# Patient Record
Sex: FEMALE | Race: BLACK OR AFRICAN AMERICAN | NOT HISPANIC OR LATINO | Employment: FULL TIME | URBAN - METROPOLITAN AREA
[De-identification: names, ages, dates, MRNs, and addresses within clinical notes are randomized per-mention and may not be internally consistent; named-entity substitution may affect disease eponyms.]

---

## 2017-12-27 ENCOUNTER — APPOINTMENT (OUTPATIENT)
Dept: WOMENS IMAGING | Facility: HOSPITAL | Age: 47
End: 2017-12-27

## 2017-12-27 PROCEDURE — 77063 BREAST TOMOSYNTHESIS BI: CPT | Performed by: RADIOLOGY

## 2017-12-27 PROCEDURE — 77067 SCR MAMMO BI INCL CAD: CPT | Performed by: RADIOLOGY

## 2018-01-10 ENCOUNTER — APPOINTMENT (OUTPATIENT)
Dept: PREADMISSION TESTING | Facility: HOSPITAL | Age: 48
End: 2018-01-10

## 2018-01-10 VITALS
DIASTOLIC BLOOD PRESSURE: 89 MMHG | BODY MASS INDEX: 37.28 KG/M2 | HEART RATE: 66 BPM | SYSTOLIC BLOOD PRESSURE: 127 MMHG | OXYGEN SATURATION: 100 % | HEIGHT: 68 IN | RESPIRATION RATE: 16 BRPM | WEIGHT: 246 LBS | TEMPERATURE: 98 F

## 2018-01-10 LAB
ANION GAP SERPL CALCULATED.3IONS-SCNC: 11.8 MMOL/L
BUN BLD-MCNC: 9 MG/DL (ref 6–20)
BUN/CREAT SERPL: 10.8 (ref 7–25)
CALCIUM SPEC-SCNC: 9.2 MG/DL (ref 8.6–10.5)
CHLORIDE SERPL-SCNC: 102 MMOL/L (ref 98–107)
CO2 SERPL-SCNC: 27.2 MMOL/L (ref 22–29)
CREAT BLD-MCNC: 0.83 MG/DL (ref 0.57–1)
DEPRECATED RDW RBC AUTO: 49.1 FL (ref 37–54)
ERYTHROCYTE [DISTWIDTH] IN BLOOD BY AUTOMATED COUNT: 16.1 % (ref 11.7–13)
GFR SERPL CREATININE-BSD FRML MDRD: 90 ML/MIN/1.73
GLUCOSE BLD-MCNC: 99 MG/DL (ref 65–99)
HCG SERPL QL: NEGATIVE
HCT VFR BLD AUTO: 31.8 % (ref 35.6–45.5)
HGB BLD-MCNC: 9.6 G/DL (ref 11.9–15.5)
MCH RBC QN AUTO: 25.5 PG (ref 26.9–32)
MCHC RBC AUTO-ENTMCNC: 30.2 G/DL (ref 32.4–36.3)
MCV RBC AUTO: 84.6 FL (ref 80.5–98.2)
PLATELET # BLD AUTO: 201 10*3/MM3 (ref 140–500)
PMV BLD AUTO: 12.4 FL (ref 6–12)
POTASSIUM BLD-SCNC: 3.7 MMOL/L (ref 3.5–5.2)
RBC # BLD AUTO: 3.76 10*6/MM3 (ref 3.9–5.2)
SODIUM BLD-SCNC: 141 MMOL/L (ref 136–145)
WBC NRBC COR # BLD: 4.22 10*3/MM3 (ref 4.5–10.7)

## 2018-01-10 PROCEDURE — 93010 ELECTROCARDIOGRAM REPORT: CPT | Performed by: INTERNAL MEDICINE

## 2018-01-10 PROCEDURE — 36415 COLL VENOUS BLD VENIPUNCTURE: CPT

## 2018-01-10 PROCEDURE — 80048 BASIC METABOLIC PNL TOTAL CA: CPT | Performed by: OBSTETRICS & GYNECOLOGY

## 2018-01-10 PROCEDURE — 84703 CHORIONIC GONADOTROPIN ASSAY: CPT | Performed by: OBSTETRICS & GYNECOLOGY

## 2018-01-10 PROCEDURE — 93005 ELECTROCARDIOGRAM TRACING: CPT

## 2018-01-10 PROCEDURE — 85027 COMPLETE CBC AUTOMATED: CPT | Performed by: OBSTETRICS & GYNECOLOGY

## 2018-01-10 RX ORDER — AZELASTINE 1 MG/ML
2 SPRAY, METERED NASAL DAILY PRN
COMMUNITY
Start: 2017-07-10

## 2018-01-10 RX ORDER — UBIDECARENONE 75 MG
100 CAPSULE ORAL DAILY
COMMUNITY

## 2018-01-10 RX ORDER — LISINOPRIL 5 MG/1
5 TABLET ORAL EVERY MORNING
COMMUNITY
End: 2022-03-01 | Stop reason: SDUPTHER

## 2018-01-10 RX ORDER — FERROUS SULFATE 325(65) MG
325 TABLET ORAL
COMMUNITY
End: 2022-05-03 | Stop reason: SDUPTHER

## 2018-01-10 RX ORDER — MULTIVIT WITH MINERALS/LUTEIN
250 TABLET ORAL 2 TIMES WEEKLY
COMMUNITY

## 2018-01-10 RX ORDER — ALBUTEROL SULFATE 90 UG/1
2 AEROSOL, METERED RESPIRATORY (INHALATION) AS NEEDED
COMMUNITY
Start: 2014-08-05 | End: 2018-04-26

## 2018-01-10 RX ORDER — LORATADINE 10 MG/1
10 TABLET ORAL DAILY PRN
COMMUNITY

## 2018-01-10 RX ORDER — FLUTICASONE PROPIONATE 50 MCG
1 SPRAY, SUSPENSION (ML) NASAL AS NEEDED
COMMUNITY
Start: 2017-04-26

## 2018-01-10 NOTE — DISCHARGE INSTRUCTIONS
Take the following medications the morning of surgery with a small sip of water:    ALBUTEROL INHALER    General Instructions:  • Do not eat solid food after midnight the night before surgery.  • You may drink clear liquids day of surgery but must stop at least one hour before your hospital arrival time @ 0600 AM STOP DRINKING!!!  • It is beneficial for you to have a clear drink that contains carbohydrates the day of surgery.  We suggest a 12 to 20 ounce bottle of Gatorade or Powerade for non-diabetic patients or a 12 to 20 ounce bottle of G2 or Powerade Zero for diabetic patients. (Pediatric patients, are not advised to drink a 12 to 20 ounce carbohydrate drink)    Clear liquids are liquids you can see through.  Nothing red in color.     Plain water                               Sports drinks  Sodas                                   Gelatin (Jell-O)  Fruit juices without pulp such as white grape juice and apple juice  Popsicles that contain no fruit or yogurt  Tea or coffee (no cream or milk added)  Gatorade / Powerade  G2 / Powerade Zero    • Patients who avoid smoking, chewing tobacco and alcohol for 4 weeks prior to surgery have a reduced risk of post-operative complications.  Quit smoking as many days before surgery as you can.  • Do not smoke, use chewing tobacco or drink alcohol the day of surgery.   • Bring any papers given to you in the doctor’s office.  • Wear clean comfortable clothes and socks.  • Do not wear contact lenses or make-up.  Bring a case for your glasses.   • Bring crutches or walker if applicable.  • Remove all piercings.  Leave jewelry and any other valuables at home.  • Hair extensions with metal clips must be removed prior to surgery.  • The Pre-Admission Testing nurse will instruct you to bring medications if unable to obtain an accurate list in Pre-Admission Testing.      Preventing a Surgical Site Infection:  • For 2 to 3 days before surgery, avoid shaving with a razor because the  razor can irritate skin and make it easier to develop an infection.  • The night prior to surgery sleep in a clean bed with clean clothing.  Do not allow pets to sleep with you.  • Shower on the morning of surgery using a fresh bar of anti-bacterial soap (such as Dial) and clean washcloth.  Dry with a clean towel and dress in clean clothing.  • Ask your surgeon if you will be receiving antibiotics prior to surgery.  • Make sure you, your family, and all healthcare providers clean their hands with soap and water or an alcohol based hand  before caring for you or your wound.    Day of surgery: 01- ARRIVE @ 0700 AM REPORT TO THE MAIN OR   Upon arrival, a Pre-op nurse and Anesthesiologist will review your health history, obtain vital signs, and answer questions you may have.  The only belongings needed at this time will be your home medications.  If you are staying overnight your family can leave the rest of your belongings in the car and bring them to your room later.  A Pre-op nurse will start an IV and you may receive medication in preparation for surgery, including something to help you relax.  Your family will be able to see you in the Pre-op area.  While you are in surgery your family should notify the waiting room  if they leave the waiting room area and provide a contact phone number.    Please be aware that surgery does come with discomfort.  We want to make every effort to control your discomfort so please discuss any uncontrolled symptoms with your nurse.   Your doctor will most likely have prescribed pain medications.      If you are going home after surgery you will receive individualized written care instructions before being discharged.  A responsible adult must drive you to and from the hospital on the day of your surgery and stay with you for 24 hours.    If you are staying overnight following surgery, you will be transported to your hospital room following the recovery  period.  [facility] has all private rooms.    If you have any questions please call Pre-Admission Testing at [phone].  Deductibles and co-payments are collected on the day of service. Please be prepared to pay the required co-pay, deductible or deposit on the day of service as defined by your plan.

## 2018-01-15 ENCOUNTER — ANESTHESIA EVENT (OUTPATIENT)
Dept: PERIOP | Facility: HOSPITAL | Age: 48
End: 2018-01-15

## 2018-01-15 ENCOUNTER — HOSPITAL ENCOUNTER (OUTPATIENT)
Facility: HOSPITAL | Age: 48
Setting detail: OBSERVATION
Discharge: HOME | End: 2018-01-17
Attending: OBSTETRICS & GYNECOLOGY | Admitting: OBSTETRICS & GYNECOLOGY

## 2018-01-15 ENCOUNTER — ANESTHESIA (OUTPATIENT)
Dept: PERIOP | Facility: HOSPITAL | Age: 48
End: 2018-01-15

## 2018-01-15 DIAGNOSIS — D25.9 UTERINE FIBROID: ICD-10-CM

## 2018-01-15 DIAGNOSIS — N93.9 ABNORMAL UTERINE BLEEDING: ICD-10-CM

## 2018-01-15 PROCEDURE — 88307 TISSUE EXAM BY PATHOLOGIST: CPT | Performed by: OBSTETRICS & GYNECOLOGY

## 2018-01-15 PROCEDURE — 25010000002 PROPOFOL 10 MG/ML EMULSION: Performed by: NURSE ANESTHETIST, CERTIFIED REGISTERED

## 2018-01-15 PROCEDURE — 25010000002 HYDROMORPHONE PER 4 MG: Performed by: NURSE ANESTHETIST, CERTIFIED REGISTERED

## 2018-01-15 PROCEDURE — 25010000002 GENTAMICIN PER 80 MG: Performed by: OBSTETRICS & GYNECOLOGY

## 2018-01-15 PROCEDURE — 25010000002 ONDANSETRON PER 1 MG: Performed by: OBSTETRICS & GYNECOLOGY

## 2018-01-15 PROCEDURE — 94799 UNLISTED PULMONARY SVC/PX: CPT

## 2018-01-15 PROCEDURE — G0378 HOSPITAL OBSERVATION PER HR: HCPCS

## 2018-01-15 PROCEDURE — 25010000002 FENTANYL CITRATE (PF) 100 MCG/2ML SOLUTION: Performed by: NURSE ANESTHETIST, CERTIFIED REGISTERED

## 2018-01-15 PROCEDURE — 25010000002 DEXAMETHASONE PER 1 MG: Performed by: NURSE ANESTHETIST, CERTIFIED REGISTERED

## 2018-01-15 PROCEDURE — 25010000002 PROMETHAZINE PER 50 MG: Performed by: OBSTETRICS & GYNECOLOGY

## 2018-01-15 PROCEDURE — 25010000002 ROPIVACAINE PER 1 MG: Performed by: OBSTETRICS & GYNECOLOGY

## 2018-01-15 PROCEDURE — 25010000002 ONDANSETRON PER 1 MG: Performed by: ANESTHESIOLOGY

## 2018-01-15 PROCEDURE — 25010000002 MIDAZOLAM PER 1 MG: Performed by: ANESTHESIOLOGY

## 2018-01-15 PROCEDURE — 25010000002 NEOSTIGMINE PER 0.5 MG: Performed by: NURSE ANESTHETIST, CERTIFIED REGISTERED

## 2018-01-15 RX ORDER — DIPHENHYDRAMINE HYDROCHLORIDE 50 MG/ML
12.5 INJECTION INTRAMUSCULAR; INTRAVENOUS
Status: DISCONTINUED | OUTPATIENT
Start: 2018-01-15 | End: 2018-01-15 | Stop reason: HOSPADM

## 2018-01-15 RX ORDER — MIDAZOLAM HYDROCHLORIDE 1 MG/ML
2 INJECTION INTRAMUSCULAR; INTRAVENOUS
Status: DISCONTINUED | OUTPATIENT
Start: 2018-01-15 | End: 2018-01-15 | Stop reason: HOSPADM

## 2018-01-15 RX ORDER — HYDROMORPHONE HCL 110MG/55ML
0.5 PATIENT CONTROLLED ANALGESIA SYRINGE INTRAVENOUS
Status: DISCONTINUED | OUTPATIENT
Start: 2018-01-15 | End: 2018-01-15 | Stop reason: HOSPADM

## 2018-01-15 RX ORDER — DEXAMETHASONE SODIUM PHOSPHATE 10 MG/ML
INJECTION INTRAMUSCULAR; INTRAVENOUS AS NEEDED
Status: DISCONTINUED | OUTPATIENT
Start: 2018-01-15 | End: 2018-01-15 | Stop reason: SURG

## 2018-01-15 RX ORDER — NAPROXEN 500 MG/1
500 TABLET ORAL
Status: COMPLETED | OUTPATIENT
Start: 2018-01-15 | End: 2018-01-17

## 2018-01-15 RX ORDER — MAGNESIUM HYDROXIDE 1200 MG/15ML
LIQUID ORAL AS NEEDED
Status: DISCONTINUED | OUTPATIENT
Start: 2018-01-15 | End: 2018-01-15 | Stop reason: HOSPADM

## 2018-01-15 RX ORDER — ACETAMINOPHEN 160 MG/5ML
650 SOLUTION ORAL EVERY 4 HOURS PRN
Status: DISCONTINUED | OUTPATIENT
Start: 2018-01-15 | End: 2018-01-17 | Stop reason: HOSPADM

## 2018-01-15 RX ORDER — ACETAMINOPHEN 650 MG/1
650 SUPPOSITORY RECTAL EVERY 4 HOURS PRN
Status: DISCONTINUED | OUTPATIENT
Start: 2018-01-15 | End: 2018-01-17 | Stop reason: HOSPADM

## 2018-01-15 RX ORDER — PROMETHAZINE HYDROCHLORIDE 25 MG/1
25 SUPPOSITORY RECTAL ONCE AS NEEDED
Status: DISCONTINUED | OUTPATIENT
Start: 2018-01-15 | End: 2018-01-15 | Stop reason: HOSPADM

## 2018-01-15 RX ORDER — ZOLPIDEM TARTRATE 5 MG/1
5 TABLET ORAL NIGHTLY PRN
Status: DISCONTINUED | OUTPATIENT
Start: 2018-01-15 | End: 2018-01-17 | Stop reason: HOSPADM

## 2018-01-15 RX ORDER — EPHEDRINE SULFATE 50 MG/ML
5 INJECTION, SOLUTION INTRAVENOUS ONCE AS NEEDED
Status: DISCONTINUED | OUTPATIENT
Start: 2018-01-15 | End: 2018-01-15 | Stop reason: HOSPADM

## 2018-01-15 RX ORDER — HYDRALAZINE HYDROCHLORIDE 20 MG/ML
5 INJECTION INTRAMUSCULAR; INTRAVENOUS
Status: DISCONTINUED | OUTPATIENT
Start: 2018-01-15 | End: 2018-01-15 | Stop reason: HOSPADM

## 2018-01-15 RX ORDER — CLINDAMYCIN PHOSPHATE 900 MG/50ML
900 INJECTION INTRAVENOUS
Status: COMPLETED | OUTPATIENT
Start: 2018-01-15 | End: 2018-01-15

## 2018-01-15 RX ORDER — SODIUM CHLORIDE, SODIUM LACTATE, POTASSIUM CHLORIDE, CALCIUM CHLORIDE 600; 310; 30; 20 MG/100ML; MG/100ML; MG/100ML; MG/100ML
9 INJECTION, SOLUTION INTRAVENOUS CONTINUOUS
Status: DISCONTINUED | OUTPATIENT
Start: 2018-01-15 | End: 2018-01-17 | Stop reason: HOSPADM

## 2018-01-15 RX ORDER — ONDANSETRON 2 MG/ML
4 INJECTION INTRAMUSCULAR; INTRAVENOUS ONCE AS NEEDED
Status: DISCONTINUED | OUTPATIENT
Start: 2018-01-15 | End: 2018-01-15 | Stop reason: HOSPADM

## 2018-01-15 RX ORDER — MIDAZOLAM HYDROCHLORIDE 1 MG/ML
1 INJECTION INTRAMUSCULAR; INTRAVENOUS
Status: DISCONTINUED | OUTPATIENT
Start: 2018-01-15 | End: 2018-01-15 | Stop reason: HOSPADM

## 2018-01-15 RX ORDER — LIDOCAINE HYDROCHLORIDE 20 MG/ML
INJECTION, SOLUTION INFILTRATION; PERINEURAL AS NEEDED
Status: DISCONTINUED | OUTPATIENT
Start: 2018-01-15 | End: 2018-01-15 | Stop reason: SURG

## 2018-01-15 RX ORDER — FAMOTIDINE 20 MG/1
20 TABLET, FILM COATED ORAL 2 TIMES DAILY
Status: DISCONTINUED | OUTPATIENT
Start: 2018-01-15 | End: 2018-01-17 | Stop reason: HOSPADM

## 2018-01-15 RX ORDER — ONDANSETRON 2 MG/ML
4 INJECTION INTRAMUSCULAR; INTRAVENOUS EVERY 6 HOURS PRN
Status: DISCONTINUED | OUTPATIENT
Start: 2018-01-15 | End: 2018-01-17 | Stop reason: HOSPADM

## 2018-01-15 RX ORDER — PROPOFOL 10 MG/ML
VIAL (ML) INTRAVENOUS AS NEEDED
Status: DISCONTINUED | OUTPATIENT
Start: 2018-01-15 | End: 2018-01-15 | Stop reason: SURG

## 2018-01-15 RX ORDER — ONDANSETRON 2 MG/ML
4 INJECTION INTRAMUSCULAR; INTRAVENOUS ONCE AS NEEDED
Status: COMPLETED | OUTPATIENT
Start: 2018-01-15 | End: 2018-01-15

## 2018-01-15 RX ORDER — LIDOCAINE HYDROCHLORIDE 10 MG/ML
0.5 INJECTION, SOLUTION EPIDURAL; INFILTRATION; INTRACAUDAL; PERINEURAL ONCE AS NEEDED
Status: DISCONTINUED | OUTPATIENT
Start: 2018-01-15 | End: 2018-01-15 | Stop reason: HOSPADM

## 2018-01-15 RX ORDER — OXYCODONE HYDROCHLORIDE AND ACETAMINOPHEN 5; 325 MG/1; MG/1
1 TABLET ORAL EVERY 4 HOURS PRN
Status: DISCONTINUED | OUTPATIENT
Start: 2018-01-15 | End: 2018-01-17 | Stop reason: HOSPADM

## 2018-01-15 RX ORDER — SCOLOPAMINE TRANSDERMAL SYSTEM 1 MG/1
1 PATCH, EXTENDED RELEASE TRANSDERMAL ONCE
Status: COMPLETED | OUTPATIENT
Start: 2018-01-15 | End: 2018-01-16

## 2018-01-15 RX ORDER — SODIUM CHLORIDE 0.9 % (FLUSH) 0.9 %
1-10 SYRINGE (ML) INJECTION AS NEEDED
Status: DISCONTINUED | OUTPATIENT
Start: 2018-01-15 | End: 2018-01-15 | Stop reason: HOSPADM

## 2018-01-15 RX ORDER — FENTANYL CITRATE 50 UG/ML
50 INJECTION, SOLUTION INTRAMUSCULAR; INTRAVENOUS
Status: DISCONTINUED | OUTPATIENT
Start: 2018-01-15 | End: 2018-01-15 | Stop reason: HOSPADM

## 2018-01-15 RX ORDER — SIMETHICONE 80 MG
80 TABLET,CHEWABLE ORAL 4 TIMES DAILY PRN
Status: DISCONTINUED | OUTPATIENT
Start: 2018-01-15 | End: 2018-01-17 | Stop reason: HOSPADM

## 2018-01-15 RX ORDER — CALCIUM CARBONATE 200(500)MG
2 TABLET,CHEWABLE ORAL 3 TIMES DAILY PRN
Status: DISCONTINUED | OUTPATIENT
Start: 2018-01-15 | End: 2018-01-17 | Stop reason: HOSPADM

## 2018-01-15 RX ORDER — FENTANYL CITRATE 50 UG/ML
INJECTION, SOLUTION INTRAMUSCULAR; INTRAVENOUS AS NEEDED
Status: DISCONTINUED | OUTPATIENT
Start: 2018-01-15 | End: 2018-01-15 | Stop reason: SURG

## 2018-01-15 RX ORDER — ONDANSETRON 4 MG/1
4 TABLET, ORALLY DISINTEGRATING ORAL EVERY 6 HOURS PRN
Status: DISCONTINUED | OUTPATIENT
Start: 2018-01-15 | End: 2018-01-17 | Stop reason: HOSPADM

## 2018-01-15 RX ORDER — ACETAMINOPHEN 325 MG/1
650 TABLET ORAL EVERY 4 HOURS PRN
Status: DISCONTINUED | OUTPATIENT
Start: 2018-01-15 | End: 2018-01-17 | Stop reason: HOSPADM

## 2018-01-15 RX ORDER — HYDROMORPHONE HCL IN 0.9% NACL 10 MG/50ML
PATIENT CONTROLLED ANALGESIA SYRINGE INTRAVENOUS
Status: COMPLETED
Start: 2018-01-15 | End: 2018-01-15

## 2018-01-15 RX ORDER — PROMETHAZINE HYDROCHLORIDE 25 MG/1
25 TABLET ORAL ONCE AS NEEDED
Status: DISCONTINUED | OUTPATIENT
Start: 2018-01-15 | End: 2018-01-15 | Stop reason: HOSPADM

## 2018-01-15 RX ORDER — PROMETHAZINE HYDROCHLORIDE 25 MG/ML
12.5 INJECTION, SOLUTION INTRAMUSCULAR; INTRAVENOUS ONCE AS NEEDED
Status: DISCONTINUED | OUTPATIENT
Start: 2018-01-15 | End: 2018-01-15 | Stop reason: HOSPADM

## 2018-01-15 RX ORDER — PROMETHAZINE HYDROCHLORIDE 25 MG/1
12.5 SUPPOSITORY RECTAL EVERY 6 HOURS PRN
Status: DISCONTINUED | OUTPATIENT
Start: 2018-01-15 | End: 2018-01-17 | Stop reason: HOSPADM

## 2018-01-15 RX ORDER — SODIUM CHLORIDE 9 MG/ML
INJECTION, SOLUTION INTRAVENOUS AS NEEDED
Status: DISCONTINUED | OUTPATIENT
Start: 2018-01-15 | End: 2018-01-15 | Stop reason: HOSPADM

## 2018-01-15 RX ORDER — ROCURONIUM BROMIDE 10 MG/ML
INJECTION, SOLUTION INTRAVENOUS AS NEEDED
Status: DISCONTINUED | OUTPATIENT
Start: 2018-01-15 | End: 2018-01-15 | Stop reason: SURG

## 2018-01-15 RX ORDER — CELECOXIB 200 MG/1
400 CAPSULE ORAL ONCE
Status: COMPLETED | OUTPATIENT
Start: 2018-01-15 | End: 2018-01-15

## 2018-01-15 RX ORDER — OXYCODONE HCL 10 MG/1
20 TABLET, FILM COATED, EXTENDED RELEASE ORAL ONCE
Status: COMPLETED | OUTPATIENT
Start: 2018-01-15 | End: 2018-01-15

## 2018-01-15 RX ORDER — GLYCOPYRROLATE 0.2 MG/ML
INJECTION INTRAMUSCULAR; INTRAVENOUS AS NEEDED
Status: DISCONTINUED | OUTPATIENT
Start: 2018-01-15 | End: 2018-01-15 | Stop reason: SURG

## 2018-01-15 RX ORDER — SODIUM CHLORIDE, SODIUM LACTATE, POTASSIUM CHLORIDE, CALCIUM CHLORIDE 600; 310; 30; 20 MG/100ML; MG/100ML; MG/100ML; MG/100ML
INJECTION, SOLUTION INTRAVENOUS CONTINUOUS PRN
Status: DISCONTINUED | OUTPATIENT
Start: 2018-01-15 | End: 2018-01-15 | Stop reason: SURG

## 2018-01-15 RX ORDER — FAMOTIDINE 10 MG/ML
20 INJECTION, SOLUTION INTRAVENOUS ONCE
Status: COMPLETED | OUTPATIENT
Start: 2018-01-15 | End: 2018-01-15

## 2018-01-15 RX ORDER — NALOXONE HCL 0.4 MG/ML
0.2 VIAL (ML) INJECTION AS NEEDED
Status: DISCONTINUED | OUTPATIENT
Start: 2018-01-15 | End: 2018-01-15 | Stop reason: HOSPADM

## 2018-01-15 RX ORDER — ONDANSETRON 4 MG/1
4 TABLET, FILM COATED ORAL EVERY 6 HOURS PRN
Status: DISCONTINUED | OUTPATIENT
Start: 2018-01-15 | End: 2018-01-17 | Stop reason: HOSPADM

## 2018-01-15 RX ORDER — PROMETHAZINE HYDROCHLORIDE 25 MG/ML
12.5 INJECTION, SOLUTION INTRAMUSCULAR; INTRAVENOUS EVERY 6 HOURS PRN
Status: DISCONTINUED | OUTPATIENT
Start: 2018-01-15 | End: 2018-01-17 | Stop reason: HOSPADM

## 2018-01-15 RX ORDER — HYDROMORPHONE HCL IN 0.9% NACL 10 MG/50ML
PATIENT CONTROLLED ANALGESIA SYRINGE INTRAVENOUS CONTINUOUS
Status: DISCONTINUED | OUTPATIENT
Start: 2018-01-15 | End: 2018-01-17 | Stop reason: HOSPADM

## 2018-01-15 RX ORDER — DOCUSATE SODIUM 100 MG/1
100 CAPSULE, LIQUID FILLED ORAL 2 TIMES DAILY PRN
Status: DISCONTINUED | OUTPATIENT
Start: 2018-01-15 | End: 2018-01-17 | Stop reason: HOSPADM

## 2018-01-15 RX ORDER — LABETALOL HYDROCHLORIDE 5 MG/ML
5 INJECTION, SOLUTION INTRAVENOUS
Status: DISCONTINUED | OUTPATIENT
Start: 2018-01-15 | End: 2018-01-15 | Stop reason: HOSPADM

## 2018-01-15 RX ORDER — DIPHENHYDRAMINE HYDROCHLORIDE 50 MG/ML
25 INJECTION INTRAMUSCULAR; INTRAVENOUS EVERY 6 HOURS PRN
Status: DISCONTINUED | OUTPATIENT
Start: 2018-01-15 | End: 2018-01-17 | Stop reason: HOSPADM

## 2018-01-15 RX ORDER — OXYCODONE AND ACETAMINOPHEN 10; 325 MG/1; MG/1
1 TABLET ORAL EVERY 4 HOURS PRN
Status: DISCONTINUED | OUTPATIENT
Start: 2018-01-15 | End: 2018-01-17 | Stop reason: HOSPADM

## 2018-01-15 RX ORDER — PROMETHAZINE HYDROCHLORIDE 12.5 MG/1
12.5 TABLET ORAL EVERY 6 HOURS PRN
Status: DISCONTINUED | OUTPATIENT
Start: 2018-01-15 | End: 2018-01-17 | Stop reason: HOSPADM

## 2018-01-15 RX ORDER — NALOXONE HCL 0.4 MG/ML
0.1 VIAL (ML) INJECTION
Status: DISCONTINUED | OUTPATIENT
Start: 2018-01-15 | End: 2018-01-17 | Stop reason: HOSPADM

## 2018-01-15 RX ADMIN — CLINDAMYCIN PHOSPHATE 900 MG: 900 INJECTION INTRAVENOUS at 10:00

## 2018-01-15 RX ADMIN — OXYCODONE HYDROCHLORIDE 20 MG: 10 TABLET, FILM COATED, EXTENDED RELEASE ORAL at 09:03

## 2018-01-15 RX ADMIN — DEXAMETHASONE SODIUM PHOSPHATE 8 MG: 10 INJECTION INTRAMUSCULAR; INTRAVENOUS at 10:15

## 2018-01-15 RX ADMIN — NAPROXEN 500 MG: 500 TABLET ORAL at 22:00

## 2018-01-15 RX ADMIN — LIDOCAINE HYDROCHLORIDE 60 MG: 20 INJECTION, SOLUTION INFILTRATION; PERINEURAL at 09:49

## 2018-01-15 RX ADMIN — SCOPALAMINE 1 PATCH: 1 PATCH, EXTENDED RELEASE TRANSDERMAL at 09:29

## 2018-01-15 RX ADMIN — HYDROMORPHONE HYDROCHLORIDE 0.5 MG: 2 INJECTION INTRAMUSCULAR; INTRAVENOUS; SUBCUTANEOUS at 14:02

## 2018-01-15 RX ADMIN — FENTANYL CITRATE 50 MCG: 50 INJECTION, SOLUTION INTRAMUSCULAR; INTRAVENOUS at 12:45

## 2018-01-15 RX ADMIN — FENTANYL CITRATE 50 MCG: 50 INJECTION, SOLUTION INTRAMUSCULAR; INTRAVENOUS at 11:45

## 2018-01-15 RX ADMIN — NEOSTIGMINE METHYLSULFATE 3 MG: 1 INJECTION INTRAMUSCULAR; INTRAVENOUS; SUBCUTANEOUS at 13:00

## 2018-01-15 RX ADMIN — FAMOTIDINE 20 MG: 10 INJECTION, SOLUTION INTRAVENOUS at 09:30

## 2018-01-15 RX ADMIN — ROCURONIUM BROMIDE 50 MG: 10 INJECTION INTRAVENOUS at 09:49

## 2018-01-15 RX ADMIN — PROMETHAZINE HYDROCHLORIDE 12.5 MG: 25 INJECTION INTRAMUSCULAR; INTRAVENOUS at 18:46

## 2018-01-15 RX ADMIN — CELECOXIB 400 MG: 200 CAPSULE ORAL at 09:03

## 2018-01-15 RX ADMIN — Medication 2 MG: at 09:30

## 2018-01-15 RX ADMIN — PROPOFOL 150 MG: 10 INJECTION, EMULSION INTRAVENOUS at 09:49

## 2018-01-15 RX ADMIN — ONDANSETRON 4 MG: 2 INJECTION INTRAMUSCULAR; INTRAVENOUS at 16:23

## 2018-01-15 RX ADMIN — SODIUM CHLORIDE, POTASSIUM CHLORIDE, SODIUM LACTATE AND CALCIUM CHLORIDE 9 ML/HR: 600; 310; 30; 20 INJECTION, SOLUTION INTRAVENOUS at 14:32

## 2018-01-15 RX ADMIN — SODIUM CHLORIDE, POTASSIUM CHLORIDE, SODIUM LACTATE AND CALCIUM CHLORIDE: 600; 310; 30; 20 INJECTION, SOLUTION INTRAVENOUS at 09:42

## 2018-01-15 RX ADMIN — Medication: at 14:53

## 2018-01-15 RX ADMIN — GLYCOPYRROLATE 0.4 MG: 0.2 INJECTION INTRAMUSCULAR; INTRAVENOUS at 13:00

## 2018-01-15 RX ADMIN — FENTANYL CITRATE 50 MCG: 50 INJECTION, SOLUTION INTRAMUSCULAR; INTRAVENOUS at 10:22

## 2018-01-15 RX ADMIN — FENTANYL CITRATE 100 MCG: 50 INJECTION, SOLUTION INTRAMUSCULAR; INTRAVENOUS at 09:49

## 2018-01-15 RX ADMIN — SIMETHICONE CHEW TAB 80 MG 80 MG: 80 TABLET ORAL at 22:01

## 2018-01-15 RX ADMIN — GENTAMICIN SULFATE 420 MG: 40 INJECTION, SOLUTION INTRAMUSCULAR; INTRAVENOUS at 10:00

## 2018-01-15 RX ADMIN — ONDANSETRON 4 MG: 2 INJECTION INTRAMUSCULAR; INTRAVENOUS at 09:30

## 2018-01-15 NOTE — ANESTHESIA PREPROCEDURE EVALUATION
Anesthesia Evaluation     Patient summary reviewed and Nursing notes reviewed   no history of anesthetic complications:  NPO Solid Status: > 8 hours  NPO Liquid Status: > 2 hours     Airway   Mallampati: II  TM distance: >3 FB  Neck ROM: full  no difficulty expected  Dental - normal exam     Pulmonary - normal exam   (+) asthma,   (-) not a smoker  Cardiovascular - normal exam  Exercise tolerance: good (4-7 METS)    ECG reviewed  Rhythm: regular  Rate: normal    (+) hypertension, valvular problems/murmurs, past MI  >12 months,   (-) dysrhythmias, cardiac stents, CABG      Neuro/Psych- negative ROS  (-) seizures, TIA, CVA  GI/Hepatic/Renal/Endo    (+) obesity,    (-) liver disease, no renal disease, diabetes, hypothyroidism, hyperthyroidism    Musculoskeletal (-) negative ROS    Abdominal  - normal exam   Substance History - negative use     OB/GYN          Other - negative ROS                                               Anesthesia Plan    ASA 3     general     intravenous induction   Anesthetic plan and risks discussed with patient, mother, sibling and child.    Plan discussed with CRNA and attending.

## 2018-01-15 NOTE — PLAN OF CARE
Problem: Patient Care Overview (Adult)  Goal: Plan of Care Review  Outcome: Ongoing (interventions implemented as appropriate)   01/15/18 0849   Coping/Psychosocial Response Interventions   Plan Of Care Reviewed With patient     Goal: Adult Individualization and Mutuality  Outcome: Ongoing (interventions implemented as appropriate)   01/15/18 0849   Individualization   Patient Specific Preferences Goes by Damian     Goal: Discharge Needs Assessment  Outcome: Ongoing (interventions implemented as appropriate)   01/15/18 0849   Discharge Needs Assessment   Concerns To Be Addressed no discharge needs identified       Problem: Perioperative Period (Adult)  Goal: Signs and Symptoms of Listed Potential Problems Will be Absent or Manageable (Perioperative Period)  Outcome: Ongoing (interventions implemented as appropriate)   01/15/18 0849   Perioperative Period   Problems Assessed (Perioperative Period) pain;infection   Problems Present (Perioperative Period) pain

## 2018-01-15 NOTE — ANESTHESIA POSTPROCEDURE EVALUATION
Patient: Jessica Mcdowell    Procedure Summary     Date Anesthesia Start Anesthesia Stop Room / Location    01/15/18 0942 1319  [facility] / [facility]       Procedure Diagnosis Surgeon Provider    LAPAROSCOPIC SUPRACERVICAL HYSTERECTOMY WITH DAVINCI ROBOT BILATERAL SALPINGECTOMY (Bilateral Abdomen) No diagnosis on file. MD Jose De Jesus Ly MD          Anesthesia Type: general  Last vitals  BP   124/78 (01/15/18 1345)   Temp   36.4 °C (97.5 °F) (01/15/18 1317)   Pulse   58 (01/15/18 1345)   Resp   12 (01/15/18 1345)     SpO2   99 % (01/15/18 1345)     Post Anesthesia Care and Evaluation    Patient location during evaluation: PACU  Patient participation: complete - patient participated  Level of consciousness: awake and alert  Pain management: adequate  Airway patency: patent  Anesthetic complications: No anesthetic complications    Cardiovascular status: acceptable  Respiratory status: acceptable  Hydration status: acceptable    Comments: --------------------            01/15/18               1345     --------------------   BP:       124/78     Pulse:      58       Resp:       12       Temp:                SpO2:      99%      --------------------

## 2018-01-15 NOTE — ANESTHESIA PROCEDURE NOTES
Airway  Urgency: elective    Difficult airway    General Information and Staff    Patient location during procedure: OR  CRNA: DIEUDONNE MENESES    Indications and Patient Condition  Indications for airway management: airway protection    Preoxygenated: yes  Mask difficulty assessment: 1 - vent by mask    Final Airway Details  Final airway type: endotracheal airway      Successful airway: ETT  Cuffed: yes   Successful intubation technique: direct laryngoscopy  Facilitating devices/methods: anterior pressure/BURP  Endotracheal tube insertion site: oral  Blade: Matilde  Blade size: #3  ETT size: 7.0 mm  Cormack-Lehane Classification: grade IIb - view of arytenoids or posterior of glottis only  Placement verified by: chest auscultation and capnometry   Measured from: lips  ETT to lips (cm): 22  Number of attempts at approach: 1    Additional Comments   ett cuff up at MOP

## 2018-01-16 LAB
ALBUMIN SERPL-MCNC: 3.3 G/DL (ref 3.5–5.2)
ALBUMIN/GLOB SERPL: 1 G/DL
ALP SERPL-CCNC: 67 U/L (ref 39–117)
ALT SERPL W P-5'-P-CCNC: 11 U/L (ref 1–33)
ANION GAP SERPL CALCULATED.3IONS-SCNC: 10.9 MMOL/L
AST SERPL-CCNC: 18 U/L (ref 1–32)
BILIRUB SERPL-MCNC: 0.3 MG/DL (ref 0.1–1.2)
BUN BLD-MCNC: 13 MG/DL (ref 6–20)
BUN/CREAT SERPL: 11.2 (ref 7–25)
CALCIUM SPEC-SCNC: 8.2 MG/DL (ref 8.6–10.5)
CHLORIDE SERPL-SCNC: 101 MMOL/L (ref 98–107)
CO2 SERPL-SCNC: 26.1 MMOL/L (ref 22–29)
CREAT BLD-MCNC: 1.16 MG/DL (ref 0.57–1)
CYTO UR: NORMAL
DEPRECATED RDW RBC AUTO: 48.8 FL (ref 37–54)
ERYTHROCYTE [DISTWIDTH] IN BLOOD BY AUTOMATED COUNT: 15.7 % (ref 11.7–13)
GFR SERPL CREATININE-BSD FRML MDRD: 61 ML/MIN/1.73
GLOBULIN UR ELPH-MCNC: 3.4 GM/DL
GLUCOSE BLD-MCNC: 110 MG/DL (ref 65–99)
HCT VFR BLD AUTO: 29.3 % (ref 35.6–45.5)
HGB BLD-MCNC: 8.6 G/DL (ref 11.9–15.5)
LAB AP CASE REPORT: NORMAL
LAB AP CLINICAL INFORMATION: NORMAL
Lab: NORMAL
MCH RBC QN AUTO: 25.1 PG (ref 26.9–32)
MCHC RBC AUTO-ENTMCNC: 29.4 G/DL (ref 32.4–36.3)
MCV RBC AUTO: 85.7 FL (ref 80.5–98.2)
PATH REPORT.FINAL DX SPEC: NORMAL
PATH REPORT.GROSS SPEC: NORMAL
PLATELET # BLD AUTO: 218 10*3/MM3 (ref 140–500)
PMV BLD AUTO: 11.9 FL (ref 6–12)
POTASSIUM BLD-SCNC: 4.2 MMOL/L (ref 3.5–5.2)
PROT SERPL-MCNC: 6.7 G/DL (ref 6–8.5)
RBC # BLD AUTO: 3.42 10*6/MM3 (ref 3.9–5.2)
SODIUM BLD-SCNC: 138 MMOL/L (ref 136–145)
WBC NRBC COR # BLD: 11.05 10*3/MM3 (ref 4.5–10.7)

## 2018-01-16 PROCEDURE — 80053 COMPREHEN METABOLIC PANEL: CPT | Performed by: OBSTETRICS & GYNECOLOGY

## 2018-01-16 PROCEDURE — 63710000001 DIPHENHYDRAMINE PER 50 MG: Performed by: OBSTETRICS & GYNECOLOGY

## 2018-01-16 PROCEDURE — G0378 HOSPITAL OBSERVATION PER HR: HCPCS

## 2018-01-16 PROCEDURE — 85027 COMPLETE CBC AUTOMATED: CPT | Performed by: OBSTETRICS & GYNECOLOGY

## 2018-01-16 RX ORDER — DIPHENHYDRAMINE HCL 50 MG
50 CAPSULE ORAL EVERY 6 HOURS PRN
Status: DISCONTINUED | OUTPATIENT
Start: 2018-01-16 | End: 2018-01-17 | Stop reason: HOSPADM

## 2018-01-16 RX ADMIN — NAPROXEN 500 MG: 500 TABLET ORAL at 12:04

## 2018-01-16 RX ADMIN — OXYCODONE HYDROCHLORIDE AND ACETAMINOPHEN 1 TABLET: 10; 325 TABLET ORAL at 08:43

## 2018-01-16 RX ADMIN — OXYCODONE HYDROCHLORIDE AND ACETAMINOPHEN 1 TABLET: 10; 325 TABLET ORAL at 14:07

## 2018-01-16 RX ADMIN — SIMETHICONE CHEW TAB 80 MG 80 MG: 80 TABLET ORAL at 20:18

## 2018-01-16 RX ADMIN — NAPROXEN 500 MG: 500 TABLET ORAL at 18:03

## 2018-01-16 RX ADMIN — DIPHENHYDRAMINE HYDROCHLORIDE 50 MG: 50 CAPSULE ORAL at 18:03

## 2018-01-16 RX ADMIN — NAPROXEN 500 MG: 500 TABLET ORAL at 08:39

## 2018-01-16 RX ADMIN — OXYCODONE HYDROCHLORIDE AND ACETAMINOPHEN 1 TABLET: 10; 325 TABLET ORAL at 18:03

## 2018-01-16 NOTE — PROGRESS NOTES
[facility]  POST OP  PROGRESS NOTE    Patient Name: Jessica Mcdowell  :  1970  MRN:  [id number]      POD1  S/p LSH  Subjective     Patient reports:    Having some pain but pain meds control it. Denies nausea and vomiting. Tolerating po. Monique out but not voided yet. Not ambulated much.       Objective       Vitals: Vital Signs Range for the last 24 hours  Temperature: Temp:  [97 °F (36.1 °C)-98.3 °F (36.8 °C)] 98.3 °F (36.8 °C)       BP: BP: ()/(53-84) 99/57   Pulse: Heart Rate:  [58-78] 72   Respirations: Resp:  [12-18] 18         Intake/Output Summary (Last 24 hours) at 18 0922  Last data filed at 18 0857   Gross per 24 hour   Intake             3180 ml   Output             1800 ml   Net             1380 ml                                             Physical Exam     General  Alert in NAD    Abdomen Soft, non-distended, good BS. Tender but appropriate. No CVAT    Incision  Clean, dry and intact     Extremities Calves NT bilaterally          Lab results reviewed:  CBC: Lab Results   Component Value Date    WBC 11.05 (H) 2018    HGB 8.6 (L) 2018    HCT 29.3 (L) 2018            Results from last 7 days  Lab Units 18  0529   SODIUM mmol/L 138   POTASSIUM mmol/L 4.2   CHLORIDE mmol/L 101   CO2 mmol/L 26.1   BUN mg/dL 13   CREATININE mg/dL 1.16*   CALCIUM mg/dL 8.2*   BILIRUBIN mg/dL 0.3   ALK PHOS U/L 67   ALT (SGPT) U/L 11   AST (SGOT) U/L 18   GLUCOSE mg/dL 110*         Assessment/Plan     POD 1 - Doing well. Hemodynamically stable. Intraoperative findings reviewed with the patient. Creatinine little increased. Increase is 0.3-0.4 above baseline of 0.8. More concerned about ureteral injury if 0.8 inc. Repeat in am if doesn't go home today.  Expected post op anemia since was anemic pre op    Plan:  Stable for discharge if voiding and faraz pain meds and po intake today. Will keep another night if not feeling better by this afternoon. ost op instruction discussed.  Follow up in 1-2 weeks.        Active Problems:    Abnormal uterine bleeding                   Brigitte Roldan MD  1/16/2018  9:22 AM

## 2018-01-16 NOTE — PLAN OF CARE
"Problem: Patient Care Overview (Adult)  Goal: Plan of Care Review  Outcome: Ongoing (interventions implemented as appropriate)   01/16/18 0609   Coping/Psychosocial Response Interventions   Plan Of Care Reviewed With patient   Patient Care Overview   Progress improving   Outcome Evaluation   Outcome Summary/Follow up Plan 4 Lap sites with dermabond. No drainage noted. scant vaginal drainage. Does not want a lot of pain meds. Pt c/o feeling somewhat \"drugged up\". PCA dilaudid is present but pt only using sparingly. F/C will be removed this am. Afebrile and VS stable.      Goal: Adult Individualization and Mutuality  Outcome: Ongoing (interventions implemented as appropriate)    Goal: Discharge Needs Assessment  Outcome: Ongoing (interventions implemented as appropriate)      Problem: Perioperative Period (Adult)  Goal: Signs and Symptoms of Listed Potential Problems Will be Absent or Manageable (Perioperative Period)  Outcome: Ongoing (interventions implemented as appropriate)      Problem: Hysterectomy (Adult)  Goal: Signs and Symptoms of Listed Potential Problems Will be Absent or Manageable (Hysterectomy)  Outcome: Ongoing (interventions implemented as appropriate)        "

## 2018-01-16 NOTE — OP NOTE
Subjective     Patient Name: Jessica Mcdowell  :  1970  MRN:  [id number]      Date of Service:  01/15/18    Surgeon:  Assistant: MD Abraham Ly         Pre-operative diagnosis(es): Menorrhagia  Fibroids  anemia     Post-operative diagnosis(es): Post-Op Diagnosis Codes:   same       Procedure(s): Procedure(s):  LAPAROSCOPIC SUPRACERVICAL HYSTERECTOMY WITH DAVINCI ROBOT BILATERAL SALPINGECTOMY           Anesthesia: Type: General       Objective          Specimens removed:   Order Name Source Comment Collection Info Order Time   TISSUE PATHOLOGY EXAM Uterus with Fallopian Tubes  Collected By: Brigitte Roldan MD 1/15/2018 10:50 AM          EBL: 100 mL     Antibiotics:                clindamycin (Cleocin) and gentamycin               Complications:      Assessment/Plan  none       Operative Findings: Normal cervix. Uterus boggy and enlarged with 4cm fibroid coming off left anterior/lateral not  pedunculated but with very minimal attachement to uterus. This fibroid was at level of internal cervical os. Uterine weight 250g. Normal tubes and ovaries bilaterally with evidence of prior tubal ligation. Couple bands of narrow adhesions from uterus to lateral pelvic side wall        Indication for surgery: This is a 47-year-old, G2, P2 with enlarged fibroid, uterus and menorrhagia with anemia with a hemoglobin of 9.7, who desired definitive therapy with hysterectomy.  She has been counseled regarding the options of hysterectomy and she opted for a supracervical hysterectomy as she has had two prior  sections and desires shorter recovery.           Description of Procedure: She was taken to the operating room where she was placed under general anesthesia.  She was placed in dorsal lithotomy position.  Her SCDs were in place.  Her arms were gently tucked at the sides allowing some movement and knees were flexed to avoid lateral pressure on her calves.  She was prepped and draped.  A Monique catheter was  placed.  A paracervical block was placed using a total of 30 mL of a mixture of 1% lidocaine and 0.5% Naropin.  Cervix appeared to be normal.  She sounded to 10 cm and an 8 cm stem on uterine manipulator was then placed.  We did not place a KOH ring because we were planning to leave the cervix.  Attention was then turned abdominally.  All port sites were injected preemptively with the same mixture of 1% lidocaine and 0.5% Naropin.  An umbilical incision was made at the superior aspect of the umbilicus as she had a prior abdominoplasty and this was the best place and this would allow extension up for the removal of the uterus.  Incision was made.  A Veress needle was placed with intraabdominal placement confirmed with a hanging drop test and low intraabdominal pressure and pneumoperitoneum was obtained.  A 12 mm Ethicon non bladed OptiView trocar was placed using the laparoscope under direct visualization with intraabdominal placement confirmed with the laparoscope.  She was placed in steep trendelenburg.  Two 8 mm Da Halina ports were placed over 10 cm laterally and a little bit above the umbilicus and a 5 mm accessory port was placed in the right upper quadrant.  These were all placed under direct visualization without difficulty.  There were some band like adhesions noted of the uterus to the anterior abdominal wall, but this was not dense and the anterior and posterior cul-de-sac were relatively free.  Uterus was enlarged.  The Da Halina robot was then docked and I went to the surgeon's console and started with monopolar scissors in the right arm and PK in the left arm.  The patient's right tube was then elevated and taken with the PK and scissors down to the level of the right uteroovarian ligament.  The right round and right uteroovarian ligament were taken in a similar fashion and this was carried down the broad ligament.  I incised the anterior leaf of the broad ligament anteriorly and the bladder was  relatively free.  I had taken down some filmy adhesion of the right side to the anterior abdominal wall.  Uterine vessels were taken as well, once they were exposed on the right.  Attention was then turned to the left.  The left tube was identified and elevated and was taken down to the uteroovarian ligament.  The left round ligament, left uteroovarian ligament were then taken using the PK and then the scissors to cut.  Some small bands of scar tissue on the left side where taken down as well.  There was a relatively large fibroid that was not exactly pedunculated, but was just barely attached with a small attachment to the uterus that was at the left slightly anterior segment near the internal cervical os.  I took the left side down to the fibroid and incised the peritoneum overlying the fibroid and shelled this out.  I was able to pull this up and remove its relatively small attachment and into the uterus.  This was a separate fibroid that was shelled out.  I took the uterine vessels on the left side as well with the PK and then excising with the scissors.  This was carried down slightly to allow access to just at the level of the cervix and the scissors using monopolar cautery was used to come across.  The uterus was lifted up and it came across the posterior side of the upper cervix and the uterus was removed from the cervical stump.  I cauterized a little bit of oozing from the cervical stump.  The manipulator was removed.  I cauterized the internal cervical os.  The pelvis was irrigated.  The pedicles were hemostatic.  At this time, we used the Tomy extraction system.  The Tomy was placed in the umbilical incision which had been extended by a few millimeters.  I placed the bag inside and both the detached fibroid and the specimen of the uterus and the tubes were placed in the bag.  This was pulled up to the incision and then using a scalpel, this was morcellated and removed through the incision.  The bag  was then removed.  The pelvis was then irrigated.  The cervical stump was a little bit oozy and Floseal was placed.  Cystoscopy was then performed using saline and the bladder dome was intact and both ureters were visualized to jet urine.  The fascia and the umbilical incision was closed with 0 Vicryl.  The skin was closed with 4-0 Monocryl and surgical glue was placed.  She was brought from general anesthesia and taken to recovery in good condition.                         Brigitte Roldan MD  01/15/18  8:59 PM

## 2018-01-16 NOTE — PROGRESS NOTES
Spoke to RN.  Taking percocet 10mg/anaprox and reports pain 7/10 but sitting up in chair most of today and doing fine.    Bad weather and no .    Will keep until am and repeat creatinine.

## 2018-01-16 NOTE — PLAN OF CARE
Problem: Patient Care Overview (Adult)  Goal: Plan of Care Review  Outcome: Ongoing (interventions implemented as appropriate)    Goal: Adult Individualization and Mutuality  Outcome: Ongoing (interventions implemented as appropriate)    Goal: Discharge Needs Assessment  Outcome: Ongoing (interventions implemented as appropriate)      Problem: Perioperative Period (Adult)  Goal: Signs and Symptoms of Listed Potential Problems Will be Absent or Manageable (Perioperative Period)  Outcome: Ongoing (interventions implemented as appropriate)      Problem: Hysterectomy (Adult)  Goal: Signs and Symptoms of Listed Potential Problems Will be Absent or Manageable (Hysterectomy)  Outcome: Ongoing (interventions implemented as appropriate)

## 2018-01-16 NOTE — PLAN OF CARE
Problem: Patient Care Overview (Adult)  Goal: Plan of Care Review  Outcome: Ongoing (interventions implemented as appropriate)   01/16/18 1419   Coping/Psychosocial Response Interventions   Plan Of Care Reviewed With patient   Patient Care Overview   Progress improving   Outcome Evaluation   Outcome Summary/Follow up Plan Lap sites CD&I, scant amount of vaginal bleeding present. Voiding without difficulty after catheter removed this AM. Saline locked. Rating pain 7/10 with Naproxen & Percocet 10 after Dilaudid PCA d/c'd. Tolerating regular diet. VSS.     Goal: Adult Individualization and Mutuality  Outcome: Ongoing (interventions implemented as appropriate)    Goal: Discharge Needs Assessment  Outcome: Ongoing (interventions implemented as appropriate)      Problem: Perioperative Period (Adult)  Goal: Signs and Symptoms of Listed Potential Problems Will be Absent or Manageable (Perioperative Period)  Outcome: Ongoing (interventions implemented as appropriate)      Problem: Hysterectomy (Adult)  Goal: Signs and Symptoms of Listed Potential Problems Will be Absent or Manageable (Hysterectomy)  Outcome: Ongoing (interventions implemented as appropriate)

## 2018-01-17 VITALS
BODY MASS INDEX: 38.22 KG/M2 | RESPIRATION RATE: 18 BRPM | SYSTOLIC BLOOD PRESSURE: 99 MMHG | WEIGHT: 252.2 LBS | TEMPERATURE: 98.2 F | OXYGEN SATURATION: 97 % | HEART RATE: 66 BPM | DIASTOLIC BLOOD PRESSURE: 59 MMHG | HEIGHT: 68 IN

## 2018-01-17 LAB
ANION GAP SERPL CALCULATED.3IONS-SCNC: 9.8 MMOL/L
BUN BLD-MCNC: 15 MG/DL (ref 6–20)
BUN/CREAT SERPL: 16.5 (ref 7–25)
CALCIUM SPEC-SCNC: 8.1 MG/DL (ref 8.6–10.5)
CHLORIDE SERPL-SCNC: 105 MMOL/L (ref 98–107)
CO2 SERPL-SCNC: 26.2 MMOL/L (ref 22–29)
CREAT BLD-MCNC: 0.91 MG/DL (ref 0.57–1)
GFR SERPL CREATININE-BSD FRML MDRD: 81 ML/MIN/1.73
GLUCOSE BLD-MCNC: 105 MG/DL (ref 65–99)
POTASSIUM BLD-SCNC: 3.8 MMOL/L (ref 3.5–5.2)
SODIUM BLD-SCNC: 141 MMOL/L (ref 136–145)

## 2018-01-17 PROCEDURE — 63710000001 DIPHENHYDRAMINE PER 50 MG: Performed by: OBSTETRICS & GYNECOLOGY

## 2018-01-17 PROCEDURE — G0378 HOSPITAL OBSERVATION PER HR: HCPCS

## 2018-01-17 PROCEDURE — 80048 BASIC METABOLIC PNL TOTAL CA: CPT | Performed by: OBSTETRICS & GYNECOLOGY

## 2018-01-17 RX ADMIN — OXYCODONE HYDROCHLORIDE AND ACETAMINOPHEN 1 TABLET: 10; 325 TABLET ORAL at 12:22

## 2018-01-17 RX ADMIN — OXYCODONE HYDROCHLORIDE AND ACETAMINOPHEN 1 TABLET: 10; 325 TABLET ORAL at 03:51

## 2018-01-17 RX ADMIN — OXYCODONE HYDROCHLORIDE AND ACETAMINOPHEN 1 TABLET: 10; 325 TABLET ORAL at 08:06

## 2018-01-17 RX ADMIN — NAPROXEN 500 MG: 500 TABLET ORAL at 08:06

## 2018-01-17 RX ADMIN — NAPROXEN 500 MG: 500 TABLET ORAL at 12:22

## 2018-01-17 RX ADMIN — DIPHENHYDRAMINE HYDROCHLORIDE 50 MG: 50 CAPSULE ORAL at 10:00

## 2018-01-17 NOTE — PLAN OF CARE
Problem: Patient Care Overview (Adult)  Goal: Plan of Care Review  Outcome: Ongoing (interventions implemented as appropriate)   01/17/18 0412   Coping/Psychosocial Response Interventions   Plan Of Care Reviewed With patient   Patient Care Overview   Progress improving   Outcome Evaluation   Outcome Summary/Follow up Plan Doing well post-op. should go home today. Taking po pain meds with good relief. Lap sites x4 intact with dermabond. Scant vag bleed. Voiding without difficulty.Wearing a girdle type underwear. Pt states it help with pain.     Goal: Adult Individualization and Mutuality  Outcome: Ongoing (interventions implemented as appropriate)    Goal: Discharge Needs Assessment  Outcome: Ongoing (interventions implemented as appropriate)      Problem: Perioperative Period (Adult)  Goal: Signs and Symptoms of Listed Potential Problems Will be Absent or Manageable (Perioperative Period)  Outcome: Ongoing (interventions implemented as appropriate)      Problem: Hysterectomy (Adult)  Goal: Signs and Symptoms of Listed Potential Problems Will be Absent or Manageable (Hysterectomy)  Outcome: Ongoing (interventions implemented as appropriate)

## 2018-01-17 NOTE — DISCHARGE SUMMARY
[facility]  POST OP  PROGRESS NOTE    Patient Name: Jessica Mcdowell  :  1970  MRN:  [id number]      POD2  Subjective     Patient reports:    Pain is well controlled just some itching. Denies nausea and vomiting. Tolerating po. Voiding and ambulating without difficulty.       Objective       Vitals: Vital Signs Range for the last 24 hours  Temperature: Temp:  [97.3 °F (36.3 °C)-98.2 °F (36.8 °C)] 98.2 °F (36.8 °C)       BP: BP: ()/(58-69) 99/59   Pulse: Heart Rate:  [62-66] 66   Respirations: Resp:  [18] 18         Intake/Output Summary (Last 24 hours) at 18 1008  Last data filed at 18 0345   Gross per 24 hour   Intake              840 ml   Output             2000 ml   Net            -1160 ml                                             Physical Exam     General  Alert in NAD    Abdomen Soft, non-distended, appropriately tender. No CVAT    Incision  Clean, dry and intact     Extremities Calves NT bilaterally          Lab results reviewed:  CBC: Lab Results   Component Value Date    WBC 11.05 (H) 2018    HGB 8.6 (L) 2018    HCT 29.3 (L) 2018            Results from last 7 days  Lab Units 18  0539   SODIUM mmol/L 141   POTASSIUM mmol/L 3.8   CHLORIDE mmol/L 105   CO2 mmol/L 26.2   BUN mg/dL 15   CREATININE mg/dL 0.91   GLUCOSE mg/dL 105*   CALCIUM mg/dL 8.1*       Assessment/Plan     POD 2 - Doing well. Hemodynamically stable. D/w benign path. Ready to go home. Pain meds working. Voiding. ambulating       Plan:  Stable for discharge. Post op instruction discussed. Follow up in 1-2 weeks.        Active Problems:    Abnormal uterine bleeding                   Brigitte Roldan MD  2018  10:08 AM

## 2021-06-19 ENCOUNTER — APPOINTMENT (OUTPATIENT)
Dept: GENERAL RADIOLOGY | Facility: HOSPITAL | Age: 51
End: 2021-06-19

## 2021-06-19 ENCOUNTER — HOSPITAL ENCOUNTER (EMERGENCY)
Facility: HOSPITAL | Age: 51
Discharge: HOME | End: 2021-06-19
Attending: EMERGENCY MEDICINE | Admitting: EMERGENCY MEDICINE

## 2021-06-19 VITALS
HEART RATE: 83 BPM | OXYGEN SATURATION: 100 % | TEMPERATURE: 98.3 F | DIASTOLIC BLOOD PRESSURE: 89 MMHG | HEIGHT: 68 IN | SYSTOLIC BLOOD PRESSURE: 149 MMHG | BODY MASS INDEX: 40.16 KG/M2 | WEIGHT: 265 LBS | RESPIRATION RATE: 16 BRPM

## 2021-06-19 DIAGNOSIS — S93.401A SPRAIN OF RIGHT ANKLE, UNSPECIFIED LIGAMENT, INITIAL ENCOUNTER: Primary | ICD-10-CM

## 2021-06-19 PROCEDURE — 73610 X-RAY EXAM OF ANKLE: CPT

## 2021-06-19 PROCEDURE — 99283 EMERGENCY DEPT VISIT LOW MDM: CPT

## 2021-06-19 NOTE — ED NOTES
Pt states that last Monday she mowed the lawn and early this Friday morning she woke up with ankle pain 10:10 and swelling.  Pt is unsure if being around cicada shells has caused this.  Pt states that the swelling has not reduced any with Motrin, ice and elevation.  Pt states that her L ankle started to swell this morning when she was at a track meet.  Pt denies pain in her L ankle.         Zhane Bailey, RN  06/19/21 2324

## 2021-06-19 NOTE — ED PROVIDER NOTES
EMERGENCY DEPARTMENT ENCOUNTER    Room Number:  23/23  Date of encounter:  6/19/2021  PCP: Mari Lara APRN  Historian: Patient      HPI:  Chief Complaint: Right ankle pain  A complete HPI/ROS/PMH/PSH/SH/FH are unobtainable due to: N/A    Context: Jessica Mcdowell is a 50 y.o. female who presents to the ED c/o right ankle pain which started Thursday night/Friday morning.  It is located in the right lateral malleolus and radiates to the dorsum of the foot.  The pain is worsened with palpation or walking.  It is described as aching.  There is no numbness or tingling.  There is no proximal fibula pain.  She has been very active the past week-she walked 8+ miles on Monday, she played basketball on Tuesday and went spelunking on Wednesday-this is more activity than she is used to.  She does not recall any traumatic injury however.      The patient was placed in a mask in triage, hand hygiene was performed before and after my interaction with the patient.  I wore a mask, safety glasses and gloves during my entire interaction with the patient.    PAST MEDICAL HISTORY  Active Ambulatory Problems     Diagnosis Date Noted   • Abnormal uterine bleeding 01/15/2018     Resolved Ambulatory Problems     Diagnosis Date Noted   • No Resolved Ambulatory Problems     Past Medical History:   Diagnosis Date   • Abnormal uterine bleeding (AUB)    • Asthma    • Eczema    • History of anemia    • History of gout    • History of hemorrhoids    • Hypertension    • Leiomyoma    • Murmur, heart    • PONV (postoperative nausea and vomiting)          PAST SURGICAL HISTORY  Past Surgical History:   Procedure Laterality Date   • COLONOSCOPY     • HEMORRHOIDECTOMY     • HYSTERECTOMY SUPRACERVICAL Bilateral 1/15/2018    Procedure: LAPAROSCOPIC SUPRACERVICAL HYSTERECTOMY WITH DAVINCI ROBOT BILATERAL SALPINGECTOMY;  Surgeon: Brigitte Roldan MD;  Location: [facility];  Service:    • WISDOM TOOTH EXTRACTION      THREE         FAMILY  HISTORY  Family History   Problem Relation Age of Onset   • Malig Hyperthermia Neg Hx          SOCIAL HISTORY  Social History     Socioeconomic History   • Marital status: Single     Spouse name: Not on file   • Number of children: Not on file   • Years of education: Not on file   • Highest education level: Not on file   Tobacco Use   • Smoking status: Never Smoker   • Smokeless tobacco: Never Used   Substance and Sexual Activity   • Alcohol use: Yes     Comment: MONTHLY 1-2 DRINKS   • Drug use: No   • Sexual activity: Defer         ALLERGIES  Tqmysdona-viissmqrp-yesnafxkh, Penicillins, and Shellfish-derived products        REVIEW OF SYSTEMS  Review of Systems   Constitutional: Negative for chills and fever.   Respiratory: Negative for chest tightness and shortness of breath.    Cardiovascular: Negative for palpitations.   Gastrointestinal: Negative for nausea and vomiting.   Musculoskeletal: Positive for gait problem and joint swelling.        All systems reviewed and negative except for those discussed in HPI.       PHYSICAL EXAM    I have reviewed the triage vital signs and nursing notes.    ED Triage Vitals   Temp Heart Rate Resp BP SpO2   06/19/21 1620 06/19/21 1620 06/19/21 1620 06/19/21 1620 06/19/21 1620   98.3 °F (36.8 °C) 116 16 (!) 148/114 100 %      Temp src Heart Rate Source Patient Position BP Location FiO2 (%)   06/19/21 1620 06/19/21 1620 06/19/21 1634 06/19/21 1634 --   Tympanic Monitor Lying Right arm        Physical Exam   Constitutional: Pt. is oriented to person, place, and time and well-developed, well-nourished, and in no distress.   Neck: Normal range of motion. Neck supple. No JVD present.   Cardiovascular: Normal rate, regular rhythm and normal heart sounds. Exam reveals no gallop and no friction rub.   No murmur heard.  Pulmonary/Chest: Effort normal and breath sounds normal. No stridor. No respiratory distress. No wheezes, no rales.   Musculoskeletal: Right foot/ankle-there is swelling  over the right lateral malleolus with mild tenderness.  The ankle joint is stable.  Midfoot is stable.  Capillary refill is brisk.  Dorsalis pedis and posterior tibial are easily palpable.  There is no fibular head tenderness.  Remaining extremities exhibit normal range of motion and are without edema, tenderness or deformity.   Neurological: Pt. is alert and oriented to person, place, and time.  She has no focal neurologic deficits.   Skin: Skin is warm and dry. No rash noted. Pt. is not diaphoretic. No erythema.   Psychiatric: Mood, affect and judgment normal.  She is pleasant and cooperative.  Nursing note and vitals reviewed.        LAB RESULTS  No results found for this or any previous visit (from the past 24 hour(s)).    Ordered the above labs and independently reviewed the results.        RADIOLOGY  XR Ankle 3+ View Right    Result Date: 6/19/2021  Patient: LUIS BELLAMY  Time Out: 17:53 Exam(s): FILM RIGHT ANKLE EXAM:   XR Right Ankle, 3 Views CLINICAL HISTORY:    Reason for exam: Pain. TECHNIQUE:   Frontal, lateral and oblique views of the right ankle. COMPARISON:   No relevant prior studies available. FINDINGS:   Bones joints:  No visible fracture or dislocation.  8 mm plantar calcaneal bony spur.  The ankle mortise and talar dome appear intact.   Soft tissues:  Bimalleolar soft tissue swelling.   No radiodense foreign bodies.  No soft tissue gas lucencies.  IMPRESSION:     1.  Bimalleolar soft tissue swelling. 2.  No visible fracture or dislocation. 3.  8 mm plantar calcaneal bony spur.     Electronically signed by Tye Morales MD on 06-19-21 at 1753      I ordered the above noted radiological studies. Reviewed by me and discussed with radiologist.  See dictation for official radiology interpretation.      PROCEDURES    Procedures      MEDICATIONS GIVEN IN ER    Medications - No data to display      PROGRESS, DATA ANALYSIS, CONSULTS, AND MEDICAL DECISION MAKING    Any/all labs have been  independently reviewed by me.  Any/all radiology studies have been reviewed by me and discussed with radiologist dictating the report.   EKG's independently viewed and interpreted by me.  Discussion below represents my analysis of pertinent findings related to patient's condition, differential diagnosis, treatment plan and final disposition.      ED Course as of Jun 19 1807   Sat Jun 19, 2021 1801 Ankle x-rays negative for acute fractures.    [WC]      ED Course User Index  [WC] Tiago Cornejo MD       AS OF 18:07 EDT VITALS:    BP - 149/94  HR - 98  TEMP - 98.3 °F (36.8 °C) (Tympanic)  02 SATS - 100%        DIAGNOSIS  Final diagnoses:   Sprain of right ankle, unspecified ligament, initial encounter         DISPOSITION  Discharged           Tiago Cornejo MD  06/19/21 1807

## 2021-06-19 NOTE — ED TRIAGE NOTES
Pt reports right ankle pain that started Friday morning around 0300. Pt states the pain caused he to wake up. Pt denies injury. Pt reports edema to bilateral ankles.     Pt was wearing a mask during assessment.  This RN wore appropriate PPE

## 2021-09-18 ENCOUNTER — APPOINTMENT (OUTPATIENT)
Dept: GENERAL RADIOLOGY | Facility: HOSPITAL | Age: 51
End: 2021-09-18

## 2021-09-18 ENCOUNTER — HOSPITAL ENCOUNTER (EMERGENCY)
Facility: HOSPITAL | Age: 51
Discharge: HOME | End: 2021-09-18
Attending: EMERGENCY MEDICINE | Admitting: EMERGENCY MEDICINE

## 2021-09-18 VITALS
TEMPERATURE: 97.7 F | RESPIRATION RATE: 16 BRPM | OXYGEN SATURATION: 99 % | BODY MASS INDEX: 40.16 KG/M2 | WEIGHT: 265 LBS | DIASTOLIC BLOOD PRESSURE: 94 MMHG | HEART RATE: 72 BPM | HEIGHT: 68 IN | SYSTOLIC BLOOD PRESSURE: 148 MMHG

## 2021-09-18 DIAGNOSIS — M25.572 ACUTE LEFT ANKLE PAIN: Primary | ICD-10-CM

## 2021-09-18 DIAGNOSIS — Z87.39 HISTORY OF GOUT: ICD-10-CM

## 2021-09-18 PROCEDURE — 73610 X-RAY EXAM OF ANKLE: CPT

## 2021-09-18 PROCEDURE — 99283 EMERGENCY DEPT VISIT LOW MDM: CPT

## 2021-09-18 RX ORDER — IBUPROFEN 800 MG/1
800 TABLET ORAL ONCE
Status: COMPLETED | OUTPATIENT
Start: 2021-09-18 | End: 2021-09-18

## 2021-09-18 RX ORDER — METHYLPREDNISOLONE 4 MG/1
TABLET ORAL
Qty: 21 TABLET | Refills: 0 | Status: SHIPPED | OUTPATIENT
Start: 2021-09-18 | End: 2022-02-16 | Stop reason: ALTCHOICE

## 2021-09-18 RX ORDER — TRAMADOL HYDROCHLORIDE 50 MG/1
50 TABLET ORAL EVERY 8 HOURS PRN
Qty: 9 TABLET | Refills: 0 | Status: SHIPPED | OUTPATIENT
Start: 2021-09-18 | End: 2022-02-16 | Stop reason: ALTCHOICE

## 2021-09-18 RX ADMIN — IBUPROFEN 800 MG: 800 TABLET, FILM COATED ORAL at 13:17

## 2021-09-18 NOTE — ED NOTES
Pt c/o left ankle pain near her heel that started yesterday without known injury.    Mask placed on patient in triage. Triage staff wore appropriate PPE during interaction with patient.        Esperanza Blanc RN  09/18/21 7101

## 2021-09-18 NOTE — ED PROVIDER NOTES
EMERGENCY DEPARTMENT ENCOUNTER    Room Number:  12/12  Date seen:  9/18/2021  PCP: Mari Lara APRN  Historian: Patient      HPI:  Chief Complaint: Left ankle pain   A complete HPI/ROS/PMH/PSH/SH/FH are unobtainable due to: Nothing  Context: Jessica Mcdowell is a 51 y.o. female who presents to the ED c/o acute onset of left ankle pain last night.  She denies any injury or trauma.  She reports that the pain is localized to the outer aspect of the left ankle and also the left heel where her Achilles tendon is.  It has made it difficult for her to walk.  She applied a air splint that she had from a previous right ankle injury while playing basketball.  She denies leg pain or leg swelling.  She has had no fever or chills.  She does have a history of gout which has previously affected her toes and her elbows.  She does not take anything for the pain prior to arrival.  She recently completed a course of antibiotics for her tooth.  She cannot recall what the antibiotic was but is going to check.            PAST MEDICAL HISTORY  Active Ambulatory Problems     Diagnosis Date Noted   • Abnormal uterine bleeding 01/15/2018     Resolved Ambulatory Problems     Diagnosis Date Noted   • No Resolved Ambulatory Problems     Past Medical History:   Diagnosis Date   • Abnormal uterine bleeding (AUB)    • Asthma    • Eczema    • History of anemia    • History of gout    • History of hemorrhoids    • Hypertension    • Leiomyoma    • Murmur, heart    • PONV (postoperative nausea and vomiting)          PAST SURGICAL HISTORY  Past Surgical History:   Procedure Laterality Date   • COLONOSCOPY     • HEMORRHOIDECTOMY     • HYSTERECTOMY SUPRACERVICAL Bilateral 1/15/2018    Procedure: LAPAROSCOPIC SUPRACERVICAL HYSTERECTOMY WITH DAVINCI ROBOT BILATERAL SALPINGECTOMY;  Surgeon: Brigitte Roldan MD;  Location: [facility];  Service:    • WISDOM TOOTH EXTRACTION      THREE         FAMILY HISTORY  Family History   Problem Relation Age of Onset    • Malig Hyperthermia Neg Hx          SOCIAL HISTORY  Social History     Socioeconomic History   • Marital status: Single     Spouse name: Not on file   • Number of children: Not on file   • Years of education: Not on file   • Highest education level: Not on file   Tobacco Use   • Smoking status: Never Smoker   • Smokeless tobacco: Never Used   Substance and Sexual Activity   • Alcohol use: Yes     Comment: MONTHLY 1-2 DRINKS   • Drug use: No   • Sexual activity: Defer         ALLERGIES  Haycuocqx-vydpfrpbu-retwthdep, Penicillins, and Shellfish-derived products        REVIEW OF SYSTEMS  Review of Systems   Review of all 14 systems is negative other than stated in the HPI above.      PHYSICAL EXAM  ED Triage Vitals [09/18/21 1233]   Temp Heart Rate Resp BP SpO2   97.7 °F (36.5 °C) 84 16 155/94 99 %      Temp src Heart Rate Source Patient Position BP Location FiO2 (%)   Tympanic -- Sitting Right arm --         GENERAL: Awake and alert, no acute distress  HENT: nares patent  EYES: no scleral icterus  CV: regular rhythm, normal rate  RESPIRATORY: normal effort  MUSCULOSKELETAL: no deformity.   Left ankle:  No proximal fibula tenderness.  Intact Schreiber's test.  Point tenderness present over the Achilles tendon insertion.  Leg compartments soft and compressible.   No medial malleolar tenderness.  No lateral malleolar tenderness.  Mild tenderness posterior to the lateral malleolus.   No fifth metatarsal tenderness.  No dorsal foot tenderness.  2+ DP/PT pulses, brisk cap refill in all toes.  5/5 strength to dorsiflexion and plantarflexion.  SILT to sural, saphenous, deep peroneal and superficial peroneal nerves.  NEURO: alert, moves all extremities, follows commands  PSYCH:  calm, cooperative  SKIN: warm, dry, no erythema or warmth.    Vital signs and nursing notes reviewed.          LAB RESULTS  No results found for this or any previous visit (from the past 24 hour(s)).    Ordered the above labs and reviewed the  results.        RADIOLOGY  XR Ankle 3+ View Left    Result Date: 9/18/2021  EXAMINATION: 3 VIEWS OF LEFT ANKLE  HISTORY: 51-year-old female with history of left ankle and heel pain without known trauma.  FINDINGS: AP, oblique and lateral radiographs of the left ankle were obtained and demonstrate moderate soft tissue swelling overlying the lateral malleolus. There is no evidence for a fracture. An osteophyte arising from the plantar surface of the calcaneus is noted.      1. There is soft tissue swelling overlying the lateral malleolus without evidence for an underlying acute bony abnormality. 2. There is a calcaneal heel spur which may be seen in setting of plantar fasciitis.        Ordered the above noted radiological studies. Reviewed by me in PACS.            PROCEDURES  Procedures            MEDICATIONS GIVEN IN ER  Medications   ibuprofen (ADVIL,MOTRIN) tablet 800 mg (800 mg Oral Given 9/18/21 1317)                   MEDICAL DECISION MAKING, PROGRESS, and CONSULTS    All labs have been independently reviewed by me.  All radiology studies have been reviewed by me and discussed with radiologist dictating the report.   EKG's independently viewed and interpreted by me.  Discussion below represents my analysis of pertinent findings related to patient's condition, differential diagnosis, treatment plan and final disposition.      Differential diagnosis includes but is not limited to:  Gouty arthritis left ankle  Achilles tendinitis  Achilles tendon rupture  Ankle sprain      ED Course as of Sep 18 1409   Sat Sep 18, 2021   1406 Patient reassessed.  I informed her that her x-ray appears reassuring.  I recommended that we treat her with a course of oral steroids and then also Tylenol or tramadol as needed for pain.  I discussed RICE therapy and the need for orthopedic surgery follow-up.  The antibiotic she was taking recently was amoxicillin.  This should not put her at increased risk for tendinitis.  She may be  experiencing an acute gout exacerbation.    [JR]      ED Course User Index  [JR] Yoseph Neal MD            I wore an N95 mask, face shield, and gloves during this patient encounter.  Patient also wearing a surgical mask.  Hand hygeine performed before and after seeing the patient.    DIAGNOSIS  Final diagnoses:   Acute left ankle pain   History of gout         DISPOSITION  DISCHARGE    Patient discharged in stable condition.    Reviewed implications of results, diagnosis, meds, responsibility to follow up, warning signs and symptoms of possible worsening, potential complications and reasons to return to ER.    Patient/Family voiced understanding of above instructions.    Discussed plan for discharge, as there is no emergent indication for admission. Patient referred to primary care provider for BP management due to today's BP. Pt/family is agreeable and understands need for follow up and repeat testing.  Pt is aware that discharge does not mean that nothing is wrong but it indicates no emergency is present that requires admission and they must continue care with follow-up as given below or physician of their choice.     FOLLOW-UP  Chase Steve MD  [address] [facility] [address] [phone]    Schedule an appointment as soon as possible for a visit            Medication List      New Prescriptions    methylPREDNISolone 4 MG dose pack  Commonly known as: MEDROL  Take as directed on package instructions.     traMADol 50 MG tablet  Commonly known as: ULTRAM  Take 1 tablet by mouth Every 8 (Eight) Hours As Needed for Moderate Pain .           Where to Get Your Medications      These medications were sent to [id number] - [place], [place] - [address] AT [facility] - [phone]  - [phone]   [address] [place] [id number]    Hours: 24-hours Phone: [phone]   · methylPREDNISolone 4 MG dose pack  · traMADol 50 MG tablet                    Latest Documented Vital Signs:  As of 14:09 EDT  BP- 155/94 HR- 84 Temp- 97.7 °F (36.5 °C) (Tympanic) O2 sat- 99%        --    Please note that portions of this were completed with a voice recognition program.          Yoseph Neal MD  09/18/21 4069

## 2021-09-18 NOTE — ED NOTES
This RN wore goggles, N95 mask, and gloves while in pts room. Pt wearing surgical mask.       Asia Gomes RN  09/18/21 3251

## 2021-12-27 ENCOUNTER — APPOINTMENT (OUTPATIENT)
Dept: GENERAL RADIOLOGY | Facility: HOSPITAL | Age: 51
End: 2021-12-27

## 2021-12-27 ENCOUNTER — HOSPITAL ENCOUNTER (EMERGENCY)
Facility: HOSPITAL | Age: 51
Discharge: HOME | End: 2021-12-28
Attending: EMERGENCY MEDICINE | Admitting: EMERGENCY MEDICINE

## 2021-12-27 DIAGNOSIS — N39.0 URINARY TRACT INFECTION WITHOUT HEMATURIA, SITE UNSPECIFIED: Primary | ICD-10-CM

## 2021-12-27 DIAGNOSIS — J18.9 PNEUMONIA OF RIGHT MIDDLE LOBE DUE TO INFECTIOUS ORGANISM: ICD-10-CM

## 2021-12-27 LAB
ALBUMIN SERPL-MCNC: 4.1 G/DL (ref 3.5–5.2)
ALBUMIN/GLOB SERPL: 1.2 G/DL
ALP SERPL-CCNC: 76 U/L (ref 39–117)
ALT SERPL W P-5'-P-CCNC: 20 U/L (ref 1–33)
ANION GAP SERPL CALCULATED.3IONS-SCNC: 11.3 MMOL/L (ref 5–15)
AST SERPL-CCNC: 28 U/L (ref 1–32)
BASOPHILS # BLD AUTO: 0.03 10*3/MM3 (ref 0–0.2)
BASOPHILS NFR BLD AUTO: 0.7 % (ref 0–1.5)
BILIRUB SERPL-MCNC: 0.4 MG/DL (ref 0–1.2)
BUN SERPL-MCNC: 7 MG/DL (ref 6–20)
BUN/CREAT SERPL: 7.4 (ref 7–25)
CALCIUM SPEC-SCNC: 9.7 MG/DL (ref 8.6–10.5)
CHLORIDE SERPL-SCNC: 99 MMOL/L (ref 98–107)
CO2 SERPL-SCNC: 25.7 MMOL/L (ref 22–29)
CREAT SERPL-MCNC: 0.95 MG/DL (ref 0.57–1)
DEPRECATED RDW RBC AUTO: 40.9 FL (ref 37–54)
EOSINOPHIL # BLD AUTO: 0.27 10*3/MM3 (ref 0–0.4)
EOSINOPHIL NFR BLD AUTO: 5.9 % (ref 0.3–6.2)
ERYTHROCYTE [DISTWIDTH] IN BLOOD BY AUTOMATED COUNT: 13.9 % (ref 12.3–15.4)
GFR SERPL CREATININE-BSD FRML MDRD: 76 ML/MIN/1.73
GLOBULIN UR ELPH-MCNC: 3.3 GM/DL
GLUCOSE SERPL-MCNC: 99 MG/DL (ref 65–99)
HCT VFR BLD AUTO: 38 % (ref 34–46.6)
HGB BLD-MCNC: 12.5 G/DL (ref 12–15.9)
HOLD SPECIMEN: NORMAL
HOLD SPECIMEN: NORMAL
IMM GRANULOCYTES # BLD AUTO: 0.02 10*3/MM3 (ref 0–0.05)
IMM GRANULOCYTES NFR BLD AUTO: 0.4 % (ref 0–0.5)
LYMPHOCYTES # BLD AUTO: 0.89 10*3/MM3 (ref 0.7–3.1)
LYMPHOCYTES NFR BLD AUTO: 19.6 % (ref 19.6–45.3)
MCH RBC QN AUTO: 27.1 PG (ref 26.6–33)
MCHC RBC AUTO-ENTMCNC: 32.9 G/DL (ref 31.5–35.7)
MCV RBC AUTO: 82.3 FL (ref 79–97)
MONOCYTES # BLD AUTO: 0.64 10*3/MM3 (ref 0.1–0.9)
MONOCYTES NFR BLD AUTO: 14.1 % (ref 5–12)
NEUTROPHILS NFR BLD AUTO: 2.69 10*3/MM3 (ref 1.7–7)
NEUTROPHILS NFR BLD AUTO: 59.3 % (ref 42.7–76)
NRBC BLD AUTO-RTO: 0 /100 WBC (ref 0–0.2)
NT-PROBNP SERPL-MCNC: 370.2 PG/ML (ref 0–450)
PLATELET # BLD AUTO: 145 10*3/MM3 (ref 140–450)
PMV BLD AUTO: 12.6 FL (ref 6–12)
POTASSIUM SERPL-SCNC: 3.7 MMOL/L (ref 3.5–5.2)
PROT SERPL-MCNC: 7.4 G/DL (ref 6–8.5)
RBC # BLD AUTO: 4.62 10*6/MM3 (ref 3.77–5.28)
SODIUM SERPL-SCNC: 136 MMOL/L (ref 136–145)
TROPONIN T SERPL-MCNC: <0.01 NG/ML (ref 0–0.03)
WBC NRBC COR # BLD: 4.54 10*3/MM3 (ref 3.4–10.8)
WHOLE BLOOD HOLD SPECIMEN: NORMAL

## 2021-12-27 PROCEDURE — 84484 ASSAY OF TROPONIN QUANT: CPT | Performed by: EMERGENCY MEDICINE

## 2021-12-27 PROCEDURE — 71046 X-RAY EXAM CHEST 2 VIEWS: CPT

## 2021-12-27 PROCEDURE — 93005 ELECTROCARDIOGRAM TRACING: CPT

## 2021-12-27 PROCEDURE — 94799 UNLISTED PULMONARY SVC/PX: CPT

## 2021-12-27 PROCEDURE — 93005 ELECTROCARDIOGRAM TRACING: CPT | Performed by: EMERGENCY MEDICINE

## 2021-12-27 PROCEDURE — 99283 EMERGENCY DEPT VISIT LOW MDM: CPT

## 2021-12-27 PROCEDURE — 81001 URINALYSIS AUTO W/SCOPE: CPT | Performed by: EMERGENCY MEDICINE

## 2021-12-27 PROCEDURE — 93010 ELECTROCARDIOGRAM REPORT: CPT | Performed by: INTERNAL MEDICINE

## 2021-12-27 PROCEDURE — 80053 COMPREHEN METABOLIC PANEL: CPT | Performed by: EMERGENCY MEDICINE

## 2021-12-27 PROCEDURE — 85025 COMPLETE CBC W/AUTO DIFF WBC: CPT

## 2021-12-27 PROCEDURE — 83880 ASSAY OF NATRIURETIC PEPTIDE: CPT

## 2021-12-27 PROCEDURE — 94640 AIRWAY INHALATION TREATMENT: CPT

## 2021-12-27 RX ORDER — SODIUM CHLORIDE 0.9 % (FLUSH) 0.9 %
10 SYRINGE (ML) INJECTION AS NEEDED
Status: DISCONTINUED | OUTPATIENT
Start: 2021-12-27 | End: 2021-12-28 | Stop reason: HOSPADM

## 2021-12-27 RX ORDER — METHYLPREDNISOLONE SODIUM SUCCINATE 125 MG/2ML
125 INJECTION, POWDER, LYOPHILIZED, FOR SOLUTION INTRAMUSCULAR; INTRAVENOUS ONCE
Status: COMPLETED | OUTPATIENT
Start: 2021-12-27 | End: 2021-12-28

## 2021-12-27 RX ORDER — IPRATROPIUM BROMIDE AND ALBUTEROL SULFATE 2.5; .5 MG/3ML; MG/3ML
3 SOLUTION RESPIRATORY (INHALATION) ONCE
Status: COMPLETED | OUTPATIENT
Start: 2021-12-27 | End: 2021-12-27

## 2021-12-27 RX ORDER — HYDROCHLOROTHIAZIDE 25 MG/1
25 TABLET ORAL DAILY
COMMUNITY
End: 2022-03-01 | Stop reason: SDUPTHER

## 2021-12-27 RX ADMIN — IPRATROPIUM BROMIDE AND ALBUTEROL SULFATE 3 ML: 2.5; .5 SOLUTION RESPIRATORY (INHALATION) at 23:52

## 2021-12-28 VITALS
RESPIRATION RATE: 20 BRPM | HEART RATE: 94 BPM | BODY MASS INDEX: 38.65 KG/M2 | OXYGEN SATURATION: 97 % | DIASTOLIC BLOOD PRESSURE: 85 MMHG | SYSTOLIC BLOOD PRESSURE: 142 MMHG | HEIGHT: 68 IN | WEIGHT: 255 LBS | TEMPERATURE: 98.2 F

## 2021-12-28 LAB
BACTERIA UR QL AUTO: ABNORMAL /HPF
BILIRUB UR QL STRIP: NEGATIVE
CLARITY UR: CLEAR
COLOR UR: YELLOW
GLUCOSE UR STRIP-MCNC: NEGATIVE MG/DL
HGB UR QL STRIP.AUTO: ABNORMAL
HYALINE CASTS UR QL AUTO: ABNORMAL /LPF
KETONES UR QL STRIP: NEGATIVE
LEUKOCYTE ESTERASE UR QL STRIP.AUTO: NEGATIVE
NITRITE UR QL STRIP: POSITIVE
PH UR STRIP.AUTO: 5.5 [PH] (ref 5–8)
PROT UR QL STRIP: ABNORMAL
QT INTERVAL: 352 MS
RBC # UR STRIP: ABNORMAL /HPF
REF LAB TEST METHOD: ABNORMAL
SP GR UR STRIP: 1.02 (ref 1–1.03)
SQUAMOUS #/AREA URNS HPF: ABNORMAL /HPF
UROBILINOGEN UR QL STRIP: ABNORMAL
WBC # UR STRIP: ABNORMAL /HPF

## 2021-12-28 PROCEDURE — 25010000002 METHYLPREDNISOLONE PER 125 MG: Performed by: EMERGENCY MEDICINE

## 2021-12-28 PROCEDURE — 96374 THER/PROPH/DIAG INJ IV PUSH: CPT

## 2021-12-28 RX ORDER — NITROFURANTOIN 25; 75 MG/1; MG/1
100 CAPSULE ORAL 2 TIMES DAILY
Qty: 10 CAPSULE | Refills: 0 | Status: SHIPPED | OUTPATIENT
Start: 2021-12-28 | End: 2021-12-29 | Stop reason: SDUPTHER

## 2021-12-28 RX ORDER — ALBUTEROL SULFATE 90 UG/1
2 AEROSOL, METERED RESPIRATORY (INHALATION) EVERY 6 HOURS PRN
Qty: 6.7 G | Refills: 1 | Status: SHIPPED | OUTPATIENT
Start: 2021-12-28 | End: 2021-12-29 | Stop reason: SDUPTHER

## 2021-12-28 RX ORDER — PREDNISONE 20 MG/1
40 TABLET ORAL DAILY
Qty: 8 TABLET | Refills: 0 | Status: SHIPPED | OUTPATIENT
Start: 2021-12-28 | End: 2021-12-29 | Stop reason: SDUPTHER

## 2021-12-28 RX ORDER — DOXYCYCLINE 100 MG/1
100 CAPSULE ORAL 2 TIMES DAILY
Qty: 20 CAPSULE | Refills: 0 | Status: SHIPPED | OUTPATIENT
Start: 2021-12-28 | End: 2021-12-29 | Stop reason: SDUPTHER

## 2021-12-28 RX ADMIN — METHYLPREDNISOLONE SODIUM SUCCINATE 125 MG: 125 INJECTION, POWDER, FOR SOLUTION INTRAMUSCULAR; INTRAVENOUS at 00:17

## 2021-12-29 RX ORDER — NITROFURANTOIN 25; 75 MG/1; MG/1
100 CAPSULE ORAL 2 TIMES DAILY
Qty: 10 CAPSULE | Refills: 0 | Status: SHIPPED | OUTPATIENT
Start: 2021-12-29 | End: 2022-01-03

## 2021-12-29 RX ORDER — ALBUTEROL SULFATE 90 UG/1
2 AEROSOL, METERED RESPIRATORY (INHALATION) EVERY 6 HOURS PRN
Qty: 6.7 G | Refills: 1 | Status: SHIPPED | OUTPATIENT
Start: 2021-12-29 | End: 2022-04-08 | Stop reason: SDUPTHER

## 2021-12-29 RX ORDER — PREDNISONE 20 MG/1
40 TABLET ORAL DAILY
Qty: 8 TABLET | Refills: 0 | Status: SHIPPED | OUTPATIENT
Start: 2021-12-29 | End: 2022-01-02

## 2021-12-29 RX ORDER — DOXYCYCLINE 100 MG/1
100 CAPSULE ORAL 2 TIMES DAILY
Qty: 20 CAPSULE | Refills: 0 | Status: SHIPPED | OUTPATIENT
Start: 2021-12-29 | End: 2022-01-08

## 2022-02-16 ENCOUNTER — LAB (OUTPATIENT)
Dept: LAB | Facility: HOSPITAL | Age: 52
End: 2022-02-16

## 2022-02-16 ENCOUNTER — CONSULT (OUTPATIENT)
Dept: BARIATRICS/WEIGHT MGMT | Facility: CLINIC | Age: 52
End: 2022-02-16

## 2022-02-16 VITALS
WEIGHT: 277 LBS | BODY MASS INDEX: 41.98 KG/M2 | SYSTOLIC BLOOD PRESSURE: 143 MMHG | HEART RATE: 76 BPM | HEIGHT: 68 IN | DIASTOLIC BLOOD PRESSURE: 95 MMHG | TEMPERATURE: 97.5 F | RESPIRATION RATE: 18 BRPM

## 2022-02-16 DIAGNOSIS — G89.29 CHRONIC PAIN OF BOTH KNEES: ICD-10-CM

## 2022-02-16 DIAGNOSIS — Z01.818 PREOPERATIVE TESTING: ICD-10-CM

## 2022-02-16 DIAGNOSIS — M25.562 CHRONIC PAIN OF BOTH KNEES: ICD-10-CM

## 2022-02-16 DIAGNOSIS — E66.01 OBESITY, CLASS III, BMI 40-49.9 (MORBID OBESITY): ICD-10-CM

## 2022-02-16 DIAGNOSIS — R73.03 PREDIABETES: ICD-10-CM

## 2022-02-16 DIAGNOSIS — E66.01 OBESITY, CLASS III, BMI 40-49.9 (MORBID OBESITY): Primary | ICD-10-CM

## 2022-02-16 DIAGNOSIS — Z71.3 DIETARY COUNSELING: ICD-10-CM

## 2022-02-16 DIAGNOSIS — M25.561 CHRONIC PAIN OF BOTH KNEES: ICD-10-CM

## 2022-02-16 DIAGNOSIS — R01.1 HEART MURMUR: ICD-10-CM

## 2022-02-16 DIAGNOSIS — I25.2 HISTORY OF HEART ATTACK: ICD-10-CM

## 2022-02-16 DIAGNOSIS — I10 PRIMARY HYPERTENSION: ICD-10-CM

## 2022-02-16 DIAGNOSIS — D50.8 IRON DEFICIENCY ANEMIA SECONDARY TO INADEQUATE DIETARY IRON INTAKE: ICD-10-CM

## 2022-02-16 DIAGNOSIS — R53.82 CHRONIC FATIGUE: ICD-10-CM

## 2022-02-16 DIAGNOSIS — M06.9 RHEUMATOID ARTHRITIS OF OTHER SITE, UNSPECIFIED WHETHER RHEUMATOID FACTOR PRESENT: ICD-10-CM

## 2022-02-16 DIAGNOSIS — J45.20 MILD INTERMITTENT ASTHMA WITHOUT COMPLICATION: ICD-10-CM

## 2022-02-16 PROBLEM — M10.9 GOUT: Status: ACTIVE | Noted: 2022-02-16

## 2022-02-16 PROBLEM — J30.2 SEASONAL ALLERGIES: Status: ACTIVE | Noted: 2022-02-16

## 2022-02-16 PROBLEM — K64.9 HEMORRHOIDS: Status: ACTIVE | Noted: 2022-02-16

## 2022-02-16 PROBLEM — K58.9 IBS (IRRITABLE BOWEL SYNDROME): Status: ACTIVE | Noted: 2022-02-16

## 2022-02-16 PROBLEM — Z87.442 HISTORY OF KIDNEY STONES: Status: ACTIVE | Noted: 2022-02-16

## 2022-02-16 PROBLEM — J45.909 ASTHMA: Status: ACTIVE | Noted: 2022-02-16

## 2022-02-16 PROBLEM — E66.813 OBESITY, CLASS III, BMI 40-49.9 (MORBID OBESITY): Status: ACTIVE | Noted: 2022-02-16

## 2022-02-16 PROBLEM — R63.5 WEIGHT GAIN: Status: ACTIVE | Noted: 2022-02-16

## 2022-02-16 PROBLEM — R60.0 LOCALIZED EDEMA: Status: ACTIVE | Noted: 2022-02-16

## 2022-02-16 PROBLEM — N93.9 ABNORMAL UTERINE BLEEDING: Status: RESOLVED | Noted: 2018-01-15 | Resolved: 2022-02-16

## 2022-02-16 LAB
HBA1C MFR BLD: 6.4 % (ref 4.8–5.6)
TSH SERPL DL<=0.05 MIU/L-ACNC: 2.83 UIU/ML (ref 0.27–4.2)

## 2022-02-16 PROCEDURE — 83036 HEMOGLOBIN GLYCOSYLATED A1C: CPT

## 2022-02-16 PROCEDURE — 99205 OFFICE O/P NEW HI 60 MIN: CPT | Performed by: NURSE PRACTITIONER

## 2022-02-16 PROCEDURE — 36415 COLL VENOUS BLD VENIPUNCTURE: CPT

## 2022-02-16 PROCEDURE — 84443 ASSAY THYROID STIM HORMONE: CPT

## 2022-02-16 NOTE — PROGRESS NOTES
[facility] BARIATRIC [facility] BARIATRIC SURGERY  [address] [id number] [phone]  [address] [id number] [phone]  Dept: [phone]  2/16/2022      Jessica Mcdowell.  [id number]  [id number]  1970  female      Chief Complaint of weight gain; unable to maintain weight loss    History of Present Illness:   Jessica is a 51 y.o. female who presents today for evaluation, education and consultation regarding weight loss surgery. The patient is interested in the sleeve gastrectomy.      Diet History:Jessica has been overweight for at least 10 years, has been 35 pounds or more overweight for at least 10 years, has been 100 pounds or more overweight for 10 or more years and started dieting at age 42.  The most weight Jessica lost was 115 pounds on physician supervised weight loss and maintained the weight loss for 5 years. Jessica describes her eating habits as snacking between meals, overeating, and eating sweets. Jessica Mcdowell has tried Atkins, Physician monitored, exercising, prescription medications and meal replacement shakes among others with success of losing up to 115 pounds, but in each instance regained the weight.     See dietician documentation for complete history.    Bariatric Surgery Evaluation: The patient is being seen for an initial visit for bariatric surgery evaluation and education.     Bariatric Co-morbidities:  hypertension, cardiovascular disease, osteoarthritis, knee pain, asthma and depression    Patient Active Problem List   Diagnosis   • Iron deficiency anemia secondary to inadequate dietary iron intake   • HTN (hypertension)   • Seasonal allergies   • Weight gain   • Chronic fatigue   • Heart murmur   • Asthma   • IBS (irritable bowel syndrome)   • Hemorrhoids   • History of kidney stones   • Chronic pain of both knees   • Rheumatoid arthritis (HCC)   • Gout   • Prediabetes   • History of heart attack    • Obesity, Class III, BMI 40-49.9 (morbid obesity) (HCC)   • Localized edema   • Preoperative testing   • Dietary counseling       Past Medical History:   Diagnosis Date   • Abnormal uterine bleeding (AUB)    • Asthma    • Eczema    • History of anemia    • History of gout    • History of hemorrhoids    • Hypertension    • Leiomyoma    • Murmur, heart    • PONV (postoperative nausea and vomiting)    • Rheumatoid arthritis (HCC) 2022       Past Surgical History:   Procedure Laterality Date   • ABDOMINOPLASTY     •  SECTION      x2   • COLONOSCOPY     • HEMORRHOIDECTOMY     • HYSTERECTOMY SUPRACERVICAL Bilateral 1/15/2018    Procedure: LAPAROSCOPIC SUPRACERVICAL HYSTERECTOMY WITH DAVINCI ROBOT BILATERAL SALPINGECTOMY;  Surgeon: Brigitte Roldan MD;  Location: [facility];  Service:    • LIPOMA EXCISION      back   • WISDOM TOOTH EXTRACTION      THREE       Allergies   Allergen Reactions   • Bwvocpmfw-Hrnrgkltl-Xmpifkypx Shortness Of Breath   • Penicillins Shortness Of Breath   • Shellfish-Derived Products Anaphylaxis   • Iodine Itching and Nausea And Vomiting         Current Outpatient Medications:   •  albuterol sulfate  (90 Base) MCG/ACT inhaler, Inhale 2 puffs Every 6 (Six) Hours As Needed for Wheezing or Shortness of Air., Disp: 6.7 g, Rfl: 1  •  ALUMINUM & MAGNESIUM HYDROXIDE PO, Take 1 tablet by mouth 3 (Three) Times a Week. HOLD PRIOR TO SURGERY, Disp: , Rfl:   •  ferrous sulfate 325 (65 FE) MG tablet, Take 325 mg by mouth Daily With Breakfast. HOLD PRIOR TO SURGERY, Disp: , Rfl:   •  fluticasone (FLONASE) 50 MCG/ACT nasal spray, 1 spray into each nostril As Needed., Disp: , Rfl:   •  hydroCHLOROthiazide (HYDRODIURIL) 25 MG tablet, Take 25 mg by mouth Daily., Disp: , Rfl:   •  lisinopril (PRINIVIL,ZESTRIL) 5 MG tablet, Take 5 mg by mouth Every Morning., Disp: , Rfl:   •  loratadine (CLARITIN) 10 MG tablet, Take 10 mg by mouth Daily., Disp: , Rfl:   •  vitamin B-12 (CYANOCOBALAMIN) 100 MCG  tablet, Take 50 mcg by mouth Daily. HOLD PRIOR TO SURGERY, Disp: , Rfl:   •  vitamin C (ASCORBIC ACID) 250 MG tablet, Take 250 mg by mouth 2 (Two) Times a Week., Disp: , Rfl:   •  azelastine (ASTELIN) 0.1 % nasal spray, 2 sprays into each nostril Daily., Disp: , Rfl:     Social History     Socioeconomic History   • Marital status: Single   Tobacco Use   • Smoking status: Never Smoker   • Smokeless tobacco: Never Used   Vaping Use   • Vaping Use: Never used   Substance and Sexual Activity   • Alcohol use: Yes     Comment: MONTHLY 1-2 DRINKS   • Drug use: No   • Sexual activity: Defer       Family History   Problem Relation Age of Onset   • Diabetes Mother    • Hypertension Mother    • Stroke Mother    • Malig Hyperthermia Neg Hx          Review of Systems:  Review of Systems   Constitutional: Positive for activity change, appetite change and fatigue.   Respiratory: Negative for chest tightness and shortness of breath.    Cardiovascular: Negative for chest pain and palpitations.   Gastrointestinal: Positive for constipation.        IBS   Musculoskeletal: Positive for arthralgias and myalgias.        RA   All other systems reviewed and are negative.      Physical Exam:  Vital Signs:  Weight: 126 kg (277 lb)   Body mass index is 41.98 kg/m².  Temp: 97.5 °F (36.4 °C)   Heart Rate: 76   BP: 143/95     Physical Exam  Vitals reviewed.   Constitutional:       General: She is not in acute distress.     Appearance: Normal appearance. She is morbidly obese.   HENT:      Head: Normocephalic and atraumatic.      Mouth/Throat:      Mouth: Mucous membranes are moist.   Eyes:      General: No scleral icterus.     Extraocular Movements: Extraocular movements intact.      Conjunctiva/sclera: Conjunctivae normal.      Pupils: Pupils are equal, round, and reactive to light.   Cardiovascular:      Rate and Rhythm: Normal rate and regular rhythm.      Heart sounds: Normal heart sounds.   Pulmonary:      Effort: Pulmonary effort is normal.  No respiratory distress.      Breath sounds: Normal breath sounds.   Abdominal:      General: Bowel sounds are normal. There is no distension.      Palpations: Abdomen is soft. There is no mass.      Tenderness: There is no abdominal tenderness. There is no guarding.   Musculoskeletal:         General: Normal range of motion.      Cervical back: Normal range of motion and neck supple.   Skin:     General: Skin is warm and dry.   Neurological:      General: No focal deficit present.      Mental Status: She is alert and oriented to person, place, and time.   Psychiatric:         Mood and Affect: Mood normal.         Behavior: Behavior normal.         Thought Content: Thought content normal.         Judgment: Judgment normal.            Assessment:         Jessica Mcdowell is a 51 y.o. year old female with medically complicated severe obesity. Weight: 126 kg (277 lb), Body mass index is 41.98 kg/m². and weight related problems including hypertension, cardiovascular disease, osteoarthritis, knee pain, and asthma.    I explained in detail, potential surgical options of interest to the patient including the RNY gastric bypass, sleeve gastrectomy, and gastric band while considering the patient's medical history. At this time, the patient expressed interest in the Laparoscopic Sleeve  All of those procedures can be performed laparoscopically but there is a chance to convert to open if any technical challenges or complications do occur.  Bariatric surgery is not cosmetic surgery but rather a tool to help a patient make a life-long commitment lifestyle changes including diet, exercise, behavior changes, and taking supplemental vitamins and minerals.    Due to the patient's BMI, history, and co-morbidities related to potential surgical complications were evaluated. Due to Jessica Mcdowell's risk factors female will obtain the following prior to being scheduled for surgical intervention:    A letter of medical support as well  as a history and physical must be obtained from her primary care provider. The patient was given a copy of a sample form, that will suffice as their letter to take to their primary are provider.    A referral for pre-operative psychological evaluation was ordered for the patient to evaluate candidacy as well as provide mental health support, should it be warranted before or after surgery.    Radiologic studies CXR 12/27/21, Cardiology studies EKG 12/27/21 and notes from primary care provider as well as labs were reviewed and  psychology clearance, Hba1c and TSH, and UGI were ordered at this time. These will be drawn and patient will be notified with results. Patient will complete new, pre-operative radiology prior to being scheduled for surgery. Jessica Mcdowell will obtain clearance from a cardiologist prior to surgery.     Jessica Mcdowell was screened for sleep apnea in our office today and based on their results she . The risks, as they relate to chronic hypercapnia r/t untreated NILDA were discussed with the patient and She verbalized understanding.     Jessica Mcdowell verbalized understanding related to COVID-19 pre-procedure testing policies and has consented to a preoperative test 48-72 hours before She's scheduled for bariatric surgical procedure.     The risks, benefits, alternatives, and potential complications of all of the sleeve gastrectomy explained in detail including, but not limited to death, anesthesia and medication adverse effect/DVT, pulmonary embolism, trocar site/incisional hernia, wound infection, abdominal infection, bleeding, failure to lose weight or gain weight and change in body image, metabolic complications with calcium, thiamine, vitamin B12, folate, iron, and anemia.    The patient was advised to start a high protein, low fat and low carbohydrate diet  start routine exercise including but not limited to 150 minutes per week. The patient received a resistance band along with a  handout of exercises. The patient was given individualized information by our dietician along with handouts.     The patient was given information regarding the GANESH educational video. GANESH is an internet based educational video which explains the sourgical procedure and answers basic questions regarding the procedure. The patient was provided with instructions and a password to watch the video.    The patient understands the surgical procedures and the different surgical options that are available.  She understands the lifestyle changes that would be required after surgery and has agreed to participate in a pre-operative and postoperative weight management program.  She also expressed understanding of possible risks, had several questions answered and desires to proceed.    I think she is a good candidate for this surgery, and is interested in a sleeve gastrectomy.    Encounter Diagnoses   Name Primary?   • Obesity, Class III, BMI 40-49.9 (morbid obesity) ([facility]) Yes   • Primary hypertension    • Preoperative testing    • Heart murmur    • History of heart attack    • Prediabetes    • Iron deficiency anemia secondary to inadequate dietary iron intake    • Chronic pain of both knees    • Rheumatoid arthritis of other site, unspecified whether rheumatoid factor present ([facility])    • Mild intermittent asthma without complication    • Chronic fatigue    • Dietary counseling        Plan:    Patient will be evaluated by a bariatric dietician psychologist before undergoing a multidisciplinary review of her candidacy. We discussed weight loss requirements prior to surgery and rationale, as well as other program requirements to ensure the safest approach to surgery. We spent time discussing different surgical procedures and plan of care throughout their lifespan to ensure long term success in achieving and maintaining a healthier weight. Patient will proceed with preoperative lab work, radiology, and endoscopy after obtaining  agreed upon specialty, clearances, sleep study, and letter of support from her primary care provider.    Total time spent during this encounter today was 60 minutes and includes preparing for the visit, reviewing tests, performing a medically appropriate examination and/or evaluations, counseling and educating the patient/family/caregiver, ordering medications, tests, or procedures, documenting information in the medical record and independently interpreting results and communicating that information with the patient/family/caregiver.    Caitlin Burroughs, APRN  2/16/2022

## 2022-02-21 ENCOUNTER — APPOINTMENT (OUTPATIENT)
Dept: BARIATRICS/WEIGHT MGMT | Facility: CLINIC | Age: 52
End: 2022-02-21

## 2022-02-21 NOTE — PROGRESS NOTES
"Bariatric Nutrition Counseling Interview    Patient Name: Jessica Mcdowell    YOB: 1970   Age: 51 y.o.  Sex: female  MRN: [id number]     Procedure considering: sleeve    Height: 68.11\"            Current weight: 277 lbs   BMI: 41.98 kg/m²                          Highest weight: 277 lbs                              Lowest weight: 135 lbs     Patient goals: 150 lbs  Weight history: weight gain as a result of pregnancies and stress/grief over time  Additional health issues to consider: hypertension, prediabetes, IBS, knee pain, Rheumatoid arthritis, edema    Patient has tried to lose weight in the past including Atkins, meal replacements, MD monitored, prescription medications and exercise. Patient has lost up to 115 lbs on diets, but was unable to maintain this long term.     Diet history revealed lactose intolerance and allergy to shellfish. Diet recall: B: coffee; L: tuna on a salad; S: hot fries or [employer] cakes; D: [employer] or [employer]. Drinking water. Eating out 3-4 times per week.   Patient identifies problem areas to be eating in response to stress, snacking on high calorie foods and inactivity     Exercise: none     Pre and post op nutrition education completed and program materials provided. Emphasized the importance of taking in at least 70 g protein and 64 oz fluid daily. Discussed personal habits and lifestyle behaviors that may influence weight loss efforts. Patient verbalized understanding and questions were answered.  Short term goals: include protein with all meals and snacks  Additional nutrition counseling available and encouraged both pre and post op.  Patient demonstrated a good comprehension of diet requirements and commitment to make healthy changes. Jessica Mcdowell appears to be a suitable candidate for bariatric surgery from a nutritional standpoint.      Asmita Randhawa, MEHRDAD  02/21/22  14:14 EST   "

## 2022-03-01 ENCOUNTER — OFFICE VISIT (OUTPATIENT)
Dept: CARDIOLOGY | Facility: CLINIC | Age: 52
End: 2022-03-01

## 2022-03-01 ENCOUNTER — TELEPHONE (OUTPATIENT)
Dept: BARIATRICS/WEIGHT MGMT | Facility: CLINIC | Age: 52
End: 2022-03-01

## 2022-03-01 VITALS
HEIGHT: 68 IN | BODY MASS INDEX: 42.74 KG/M2 | DIASTOLIC BLOOD PRESSURE: 98 MMHG | SYSTOLIC BLOOD PRESSURE: 173 MMHG | WEIGHT: 282 LBS | HEART RATE: 78 BPM

## 2022-03-01 DIAGNOSIS — I10 PRIMARY HYPERTENSION: Primary | ICD-10-CM

## 2022-03-01 DIAGNOSIS — Z01.810 PRE-OPERATIVE CARDIOVASCULAR EXAMINATION: ICD-10-CM

## 2022-03-01 DIAGNOSIS — E66.01 OBESITY, CLASS III, BMI 40-49.9 (MORBID OBESITY): ICD-10-CM

## 2022-03-01 PROCEDURE — 93000 ELECTROCARDIOGRAM COMPLETE: CPT | Performed by: INTERNAL MEDICINE

## 2022-03-01 PROCEDURE — 99203 OFFICE O/P NEW LOW 30 MIN: CPT | Performed by: INTERNAL MEDICINE

## 2022-03-01 RX ORDER — HYDROCHLOROTHIAZIDE 25 MG/1
25 TABLET ORAL DAILY
Qty: 30 TABLET | Refills: 6 | Status: SHIPPED | OUTPATIENT
Start: 2022-03-01 | End: 2022-04-04 | Stop reason: ALTCHOICE

## 2022-03-01 RX ORDER — LISINOPRIL 5 MG/1
5 TABLET ORAL EVERY MORNING
Qty: 30 TABLET | Refills: 6 | Status: SHIPPED | OUTPATIENT
Start: 2022-03-01 | End: 2022-10-26 | Stop reason: SDUPTHER

## 2022-03-01 NOTE — PROGRESS NOTES
NP REF BY MELANIE BROUSSARD   CARDIAC CLEARANCE-BARIATRIC    HIST OF MI HTN MURMUR   Subjective:        [facility]  Cardiology Consult Note    Patient Identification:  Name: Jessica Mcdowell  Age: 51 y.o.  Sex: female  :  1970  MRN: [id number]             CC    BARIATRIC SURG   SOB  HTN  LEG SWELLING    History of Present Illness:   51-year-old female here for the cardiac evaluation as well as establishment of the care as the patient will need surgical clearance for the bariatric surgery    Comorbid cardiac risk factors:     Past Medical History:  Past Medical History:   Diagnosis Date   • Abnormal uterine bleeding (AUB)    • Asthma    • Eczema    • History of anemia    • History of gout    • History of hemorrhoids    • Hypertension    • Leiomyoma    • Murmur, heart    • PONV (postoperative nausea and vomiting)    • Rheumatoid arthritis (HCC) 2022     Past Surgical History:  Past Surgical History:   Procedure Laterality Date   • ABDOMINOPLASTY     •  SECTION      x2   • COLONOSCOPY     • HEMORRHOIDECTOMY     • HYSTERECTOMY SUPRACERVICAL Bilateral 1/15/2018    Procedure: LAPAROSCOPIC SUPRACERVICAL HYSTERECTOMY WITH DAVINCI ROBOT BILATERAL SALPINGECTOMY;  Surgeon: Brigitte Roldan MD;  Location: [facility];  Service:    • LIPOMA EXCISION      back   • WISDOM TOOTH EXTRACTION      THREE      Allergies:  Allergies   Allergen Reactions   • Fwjcdoirn-Tkwhpdive-Twipeyxig Shortness Of Breath   • Penicillins Shortness Of Breath   • Shellfish-Derived Products Anaphylaxis   • Iodine Itching and Nausea And Vomiting     Home Meds:  (Not in a hospital admission)    Current Meds:   [unfilled]  Social History:   Social History     Tobacco Use   • Smoking status: Never Smoker   • Smokeless tobacco: Never Used   Substance Use Topics   • Alcohol use: Yes     Comment: MONTHLY 1-2 DRINKS      Family History:  Family History   Problem Relation Age of Onset   • Diabetes Mother    • Hypertension Mother     • Stroke Mother    • Malig Hyperthermia Neg Hx         Review of Systems    Constitutional: No weakness,fatigue, fever, rigors, chills   Eyes: No vision changes, eye pain   ENT/oropharynx: No difficulty swallowing, sore throat, epistaxis, changes in hearing   Cardiovascular: No chest pain, chest tightness, palpitations, paroxysmal nocturnal dyspnea, orthopnea, diaphoresis, dizziness / syncopal episode   Respiratory: No shortness of breath, dyspnea on exertion, cough, wheezing hemoptysis   Gastrointestinal: No abdominal pain, nausea, vomiting, diarrhea, bloody stools   Genitourinary: No hematuria, dysuria   Neurological: No headache, tremors, numbness,  one-sided weakness    Musculoskeletal: No cramps, myalgias,  joint pain, joint swelling   Integument: No rash, edema           Constitutional:  Heart Rate:  [78] 78  BP: (173)/(98) 173/98    Physical Exam   General:  Appears in no acute distress  Eyes: PERTL,  HEENT:  No JVD. Thyroid not visibly enlarged. No mucosal pallor or cyanosis  Respiratory: Respirations regular and unlabored at rest. BBS with good air entry in all fields. No crackles, rubs or wheezes auscultated  Cardiovascular: S1S2 Regular rate and rhythm. No murmur, rub or gallop auscultated. No carotid bruits. DP/PT pulses    . No pretibial pitting edema  Gastrointestinal: Abdomen soft, flat, non tender. Bowel sounds present. No hepatosplenomegaly. No ascites  Musculoskeletal: BYERS x4. No abnormal movements  Extremities: No digital clubbing or cyanosis  Skin: Color pink. Skin warm and dry to touch. No rashes  No xanthoma  Neuro: AAO x3 CN II-XII grossly intact            ECG 12 Lead    Date/Time: 3/1/2022 3:51 PM  Performed by: Cristian Saleh MD  Authorized by: Cristian Saleh MD   Comparison: not compared with previous ECG   Previous ECG: no previous ECG available  Rhythm: sinus rhythm                Cardiographics  ECG:     Telemetry:    Echocardiogram:     Imaging  Chest X-ray:     Lab  Review               @[email or web address]@              Assessment:/ Recommendations / Plan:   Patient Active Problem List   Diagnosis   • Iron deficiency anemia secondary to inadequate dietary iron intake   • HTN (hypertension)   • Seasonal allergies   • Weight gain   • Chronic fatigue   • Heart murmur   • Asthma   • IBS (irritable bowel syndrome)   • Hemorrhoids   • History of kidney stones   • Chronic pain of both knees   • Rheumatoid arthritis (HCC)   • Gout   • Prediabetes   • History of heart attack   • Obesity, Class III, BMI 40-49.9 (morbid obesity) ([facility])   • Localized edema   • Preoperative testing   • Dietary counseling                    ICD-10-CM ICD-9-CM   1. Primary hypertension  I10 401.9   2. Obesity, Class III, BMI 40-49.9 (morbid obesity) ([facility])  E66.01 278.01     1. Primary hypertension  Patient has not taken the medication for the last 2 weeks as patient was not able to get the refills from the primary care physician refills has been given    Considering the patient's symptoms as well as clinical situation and  EKG findings, along with cardiac risk factors, ischemic workup is necessary to rule out ischemic cardiomyopathy, stress induced arrhythmias, and functional capacity for diagnosis as well as prognostic consideration    Considering patient's medical condition as well as the risk factors, patient will require echocardiogram for further evaluation for the LV function, four-chamber evaluation, including the pressures, valvular function and  pericardial disease and pericardial effusion      2. Obesity, Class III, BMI 40-49.9 (morbid obesity) ([facility])  Counseling has been done    ETT, ECHO  PTHAS NOT TAKEN MEDS  WE WIILL REFILL  Labs/tests ordered for am      Cristian Saleh MD  3/1/2022, 15:51 EST      EMR Dragon/Transcription:   Dictated utilizing Dragon dictation

## 2022-03-01 NOTE — TELEPHONE ENCOUNTER
Inform about results  labs  ----- Message from AARTI Mora sent at 2/18/2022  8:56 AM EST -----  Hga1c elevated some since 2 years ago.  Still in prediabetic range.  Thyroid ok.  All ok for surgery.

## 2022-03-07 ENCOUNTER — HOSPITAL ENCOUNTER (OUTPATIENT)
Dept: GENERAL RADIOLOGY | Facility: HOSPITAL | Age: 52
Discharge: HOME | End: 2022-03-07
Admitting: NURSE PRACTITIONER

## 2022-03-07 DIAGNOSIS — M06.9 RHEUMATOID ARTHRITIS OF OTHER SITE, UNSPECIFIED WHETHER RHEUMATOID FACTOR PRESENT: ICD-10-CM

## 2022-03-07 DIAGNOSIS — M25.562 CHRONIC PAIN OF BOTH KNEES: ICD-10-CM

## 2022-03-07 DIAGNOSIS — Z71.3 DIETARY COUNSELING: ICD-10-CM

## 2022-03-07 DIAGNOSIS — M25.561 CHRONIC PAIN OF BOTH KNEES: ICD-10-CM

## 2022-03-07 DIAGNOSIS — I10 PRIMARY HYPERTENSION: ICD-10-CM

## 2022-03-07 DIAGNOSIS — R53.82 CHRONIC FATIGUE: ICD-10-CM

## 2022-03-07 DIAGNOSIS — J45.20 MILD INTERMITTENT ASTHMA WITHOUT COMPLICATION: ICD-10-CM

## 2022-03-07 DIAGNOSIS — R01.1 HEART MURMUR: ICD-10-CM

## 2022-03-07 DIAGNOSIS — I25.2 HISTORY OF HEART ATTACK: ICD-10-CM

## 2022-03-07 DIAGNOSIS — D50.8 IRON DEFICIENCY ANEMIA SECONDARY TO INADEQUATE DIETARY IRON INTAKE: ICD-10-CM

## 2022-03-07 DIAGNOSIS — R73.03 PREDIABETES: ICD-10-CM

## 2022-03-07 DIAGNOSIS — G89.29 CHRONIC PAIN OF BOTH KNEES: ICD-10-CM

## 2022-03-07 DIAGNOSIS — E66.01 OBESITY, CLASS III, BMI 40-49.9 (MORBID OBESITY): ICD-10-CM

## 2022-03-07 DIAGNOSIS — Z01.818 PREOPERATIVE TESTING: ICD-10-CM

## 2022-03-07 PROCEDURE — 74246 X-RAY XM UPR GI TRC 2CNTRST: CPT

## 2022-03-07 RX ADMIN — ANTACID/ANTIFLATULENT 1 PACKET: 380; 550; 10; 10 GRANULE, EFFERVESCENT ORAL at 09:00

## 2022-03-07 RX ADMIN — BARIUM SULFATE 183 ML: 960 POWDER, FOR SUSPENSION ORAL at 09:00

## 2022-03-07 RX ADMIN — BARIUM SULFATE 135 ML: 980 POWDER, FOR SUSPENSION ORAL at 09:00

## 2022-03-14 ENCOUNTER — TELEPHONE (OUTPATIENT)
Dept: BARIATRICS/WEIGHT MGMT | Facility: CLINIC | Age: 52
End: 2022-03-14

## 2022-03-14 NOTE — TELEPHONE ENCOUNTER
Inform about results Upper GI  ----- Message from AARTI Mora sent at 3/11/2022 10:21 AM EST -----  Upper GI normal except did show some reflux.  Would recommend Pepcid or Prilosec otc.  Ok for surgery.

## 2022-03-17 ENCOUNTER — HOSPITAL ENCOUNTER (OUTPATIENT)
Dept: CARDIOLOGY | Facility: HOSPITAL | Age: 52
Discharge: HOME | End: 2022-03-17
Admitting: INTERNAL MEDICINE

## 2022-03-17 VITALS — DIASTOLIC BLOOD PRESSURE: 72 MMHG | HEART RATE: 93 BPM | SYSTOLIC BLOOD PRESSURE: 128 MMHG

## 2022-03-17 DIAGNOSIS — R94.39 ABNORMAL STRESS TEST: ICD-10-CM

## 2022-03-17 DIAGNOSIS — I10 PRIMARY HYPERTENSION: Primary | ICD-10-CM

## 2022-03-17 PROCEDURE — 93016 CV STRESS TEST SUPVJ ONLY: CPT

## 2022-03-17 PROCEDURE — 93018 CV STRESS TEST I&R ONLY: CPT | Performed by: INTERNAL MEDICINE

## 2022-03-17 PROCEDURE — 93017 CV STRESS TEST TRACING ONLY: CPT

## 2022-03-18 LAB
BH CV STRESS BP STAGE 1: NORMAL
BH CV STRESS DURATION MIN STAGE 1: 3
BH CV STRESS DURATION MIN STAGE 2: 0
BH CV STRESS DURATION SEC STAGE 1: 0
BH CV STRESS DURATION SEC STAGE 2: 46
BH CV STRESS GRADE STAGE 1: 10
BH CV STRESS GRADE STAGE 2: 12
BH CV STRESS HR STAGE 1: 140
BH CV STRESS HR STAGE 2: 146
BH CV STRESS METS STAGE 1: 4.6
BH CV STRESS METS STAGE 2: 5.1
BH CV STRESS PROTOCOL 1: NORMAL
BH CV STRESS RECOVERY BP: NORMAL MMHG
BH CV STRESS RECOVERY HR: 86 BPM
BH CV STRESS SPEED STAGE 1: 1.7
BH CV STRESS SPEED STAGE 2: 2.5
BH CV STRESS STAGE 1: 1
BH CV STRESS STAGE 2: 2
MAXIMAL PREDICTED HEART RATE: 171 BPM
PERCENT MAX PREDICTED HR: 85.38 %
STRESS BASELINE BP: NORMAL MMHG
STRESS BASELINE HR: 94 BPM
STRESS PERCENT HR: 100 %
STRESS POST ESTIMATED WORKLOAD: 5.2 METS
STRESS POST EXERCISE DUR MIN: 3 MIN
STRESS POST EXERCISE DUR SEC: 46 SEC
STRESS POST PEAK BP: NORMAL MMHG
STRESS POST PEAK HR: 146 BPM
STRESS TARGET HR: 145 BPM

## 2022-03-23 ENCOUNTER — HOSPITAL ENCOUNTER (OUTPATIENT)
Dept: CARDIOLOGY | Facility: HOSPITAL | Age: 52
Discharge: HOME | End: 2022-03-23
Admitting: INTERNAL MEDICINE

## 2022-03-23 VITALS
WEIGHT: 282 LBS | DIASTOLIC BLOOD PRESSURE: 87 MMHG | HEART RATE: 79 BPM | BODY MASS INDEX: 42.74 KG/M2 | SYSTOLIC BLOOD PRESSURE: 136 MMHG | HEIGHT: 68 IN

## 2022-03-23 PROCEDURE — 93306 TTE W/DOPPLER COMPLETE: CPT

## 2022-03-23 PROCEDURE — 93306 TTE W/DOPPLER COMPLETE: CPT | Performed by: INTERNAL MEDICINE

## 2022-03-24 LAB
ASCENDING AORTA: 2.7 CM
BH CV ECHO MEAS - ACS: 2.03 CM
BH CV ECHO MEAS - AO MAX PG: 11.7 MMHG
BH CV ECHO MEAS - AO MEAN PG: 7.1 MMHG
BH CV ECHO MEAS - AO ROOT DIAM: 2.5 CM
BH CV ECHO MEAS - AO V2 MAX: 171 CM/SEC
BH CV ECHO MEAS - AO V2 VTI: 33.8 CM
BH CV ECHO MEAS - AVA(I,D): 2.24 CM2
BH CV ECHO MEAS - EDV(CUBED): 63.2 ML
BH CV ECHO MEAS - EDV(MOD-SP2): 48 ML
BH CV ECHO MEAS - EDV(MOD-SP4): 52 ML
BH CV ECHO MEAS - EF(MOD-BP): 64.6 %
BH CV ECHO MEAS - EF(MOD-SP2): 58.3 %
BH CV ECHO MEAS - EF(MOD-SP4): 67.3 %
BH CV ECHO MEAS - ESV(CUBED): 19.6 ML
BH CV ECHO MEAS - ESV(MOD-SP2): 20 ML
BH CV ECHO MEAS - ESV(MOD-SP4): 17 ML
BH CV ECHO MEAS - FS: 32.3 %
BH CV ECHO MEAS - IVS/LVPW: 0.93 CM
BH CV ECHO MEAS - IVSD: 1.37 CM
BH CV ECHO MEAS - LA DIMENSION: 2.9 CM
BH CV ECHO MEAS - LAT PEAK E' VEL: 9.5 CM/SEC
BH CV ECHO MEAS - LV DIASTOLIC VOL/BSA (35-75): 22 CM2
BH CV ECHO MEAS - LV MASS(C)D: 213 GRAMS
BH CV ECHO MEAS - LV MAX PG: 6.1 MMHG
BH CV ECHO MEAS - LV MEAN PG: 3.8 MMHG
BH CV ECHO MEAS - LV SYSTOLIC VOL/BSA (12-30): 7.2 CM2
BH CV ECHO MEAS - LV V1 MAX: 123.6 CM/SEC
BH CV ECHO MEAS - LV V1 VTI: 26.5 CM
BH CV ECHO MEAS - LVIDD: 4 CM
BH CV ECHO MEAS - LVIDS: 2.7 CM
BH CV ECHO MEAS - LVOT AREA: 2.9 CM2
BH CV ECHO MEAS - LVOT DIAM: 1.91 CM
BH CV ECHO MEAS - LVPWD: 1.47 CM
BH CV ECHO MEAS - MED PEAK E' VEL: 8.8 CM/SEC
BH CV ECHO MEAS - MV A MAX VEL: 84.5 CM/SEC
BH CV ECHO MEAS - MV DEC SLOPE: 399.8 CM/SEC2
BH CV ECHO MEAS - MV DEC TIME: 189 MSEC
BH CV ECHO MEAS - MV E MAX VEL: 126.7 CM/SEC
BH CV ECHO MEAS - MV E/A: 1.5
BH CV ECHO MEAS - MV MAX PG: 5.5 MMHG
BH CV ECHO MEAS - MV MEAN PG: 2.32 MMHG
BH CV ECHO MEAS - MV V2 VTI: 30.6 CM
BH CV ECHO MEAS - MVA(VTI): 2.48 CM2
BH CV ECHO MEAS - PA ACC TIME: 0.16 SEC
BH CV ECHO MEAS - PA PR(ACCEL): 8.9 MMHG
BH CV ECHO MEAS - PA V2 MAX: 97.7 CM/SEC
BH CV ECHO MEAS - RV MAX PG: 2.01 MMHG
BH CV ECHO MEAS - RV V1 MAX: 70.8 CM/SEC
BH CV ECHO MEAS - RV V1 VTI: 12.7 CM
BH CV ECHO MEAS - RVDD: 2.7 CM
BH CV ECHO MEAS - RVOT DIAM: 2.7 CM
BH CV ECHO MEAS - SI(MOD-SP2): 11.8 ML/M2
BH CV ECHO MEAS - SI(MOD-SP4): 14.8 ML/M2
BH CV ECHO MEAS - SV(LVOT): 75.8 ML
BH CV ECHO MEAS - SV(MOD-SP2): 28 ML
BH CV ECHO MEAS - SV(MOD-SP4): 35 ML
BH CV ECHO MEAS - SV(RVOT): 71.2 ML
BH CV ECHO MEAS - TAPSE (>1.6): 2.8 CM
BH CV ECHO MEASUREMENTS AVERAGE E/E' RATIO: 13.85
BH CV XLRA - RV BASE: 2.8 CM
BH CV XLRA - RV LENGTH: 7.3 CM
BH CV XLRA - RV MID: 2.11 CM
BH CV XLRA - TDI S': 14.8 CM/SEC
LEFT ATRIUM VOLUME INDEX: 20 ML/M2
MAXIMAL PREDICTED HEART RATE: 171 BPM
SINUS: 2.6 CM
STJ: 2.6 CM
STRESS TARGET HR: 145 BPM

## 2022-04-04 ENCOUNTER — HOSPITAL ENCOUNTER (OUTPATIENT)
Dept: CARDIOLOGY | Facility: HOSPITAL | Age: 52
Discharge: HOME | End: 2022-04-04

## 2022-04-04 ENCOUNTER — OFFICE VISIT (OUTPATIENT)
Dept: CARDIOLOGY | Facility: CLINIC | Age: 52
End: 2022-04-04

## 2022-04-04 VITALS
SYSTOLIC BLOOD PRESSURE: 110 MMHG | WEIGHT: 277 LBS | OXYGEN SATURATION: 97 % | BODY MASS INDEX: 41.98 KG/M2 | HEART RATE: 71 BPM | RESPIRATION RATE: 18 BRPM | HEIGHT: 68 IN | DIASTOLIC BLOOD PRESSURE: 74 MMHG

## 2022-04-04 VITALS
WEIGHT: 283 LBS | BODY MASS INDEX: 42.89 KG/M2 | SYSTOLIC BLOOD PRESSURE: 142 MMHG | HEART RATE: 79 BPM | HEIGHT: 68 IN | DIASTOLIC BLOOD PRESSURE: 97 MMHG

## 2022-04-04 DIAGNOSIS — I10 PRIMARY HYPERTENSION: ICD-10-CM

## 2022-04-04 DIAGNOSIS — M79.89 LEFT LEG SWELLING: Primary | ICD-10-CM

## 2022-04-04 DIAGNOSIS — Z01.818 PREOPERATIVE EVALUATION OF A MEDICAL CONDITION TO RULE OUT SURGICAL CONTRAINDICATIONS (TAR REQUIRED): ICD-10-CM

## 2022-04-04 DIAGNOSIS — R94.39 ABNORMAL STRESS TEST: ICD-10-CM

## 2022-04-04 LAB
BH CV REST NUCLEAR ISOTOPE DOSE: 10.9 MCI
BH CV STRESS BP STAGE 1: NORMAL
BH CV STRESS BP STAGE 2: NORMAL
BH CV STRESS DURATION MIN STAGE 1: 5
BH CV STRESS DURATION MIN STAGE 2: 2
BH CV STRESS DURATION SEC STAGE 1: 8
BH CV STRESS DURATION SEC STAGE 2: 8
BH CV STRESS GRADE STAGE 1: 10
BH CV STRESS GRADE STAGE 2: 12
BH CV STRESS HR STAGE 1: 134
BH CV STRESS HR STAGE 2: 149
BH CV STRESS METS STAGE 1: 4.1
BH CV STRESS METS STAGE 2: 5.7
BH CV STRESS NUCLEAR ISOTOPE DOSE: 30.9 MCI
BH CV STRESS PROTOCOL 1: NORMAL
BH CV STRESS RECOVERY BP: NORMAL MMHG
BH CV STRESS RECOVERY HR: 74 BPM
BH CV STRESS RECOVERY O2: 97 %
BH CV STRESS SPEED STAGE 1: 1.7
BH CV STRESS SPEED STAGE 2: 2.4
BH CV STRESS STAGE 1: 1
BH CV STRESS STAGE 2: 2
LV EF NUC BP: 60 %
MAXIMAL PREDICTED HEART RATE: 171 BPM
PERCENT MAX PREDICTED HR: 87.13 %
STRESS BASELINE BP: NORMAL MMHG
STRESS BASELINE HR: 77 BPM
STRESS O2 SAT REST: 97 %
STRESS PERCENT HR: 103 %
STRESS POST ESTIMATED WORKLOAD: 5.7 METS
STRESS POST EXERCISE DUR MIN: 5 MIN
STRESS POST EXERCISE DUR SEC: 8 SEC
STRESS POST PEAK BP: NORMAL MMHG
STRESS POST PEAK HR: 149 BPM
STRESS TARGET HR: 145 BPM

## 2022-04-04 PROCEDURE — 93018 CV STRESS TEST I&R ONLY: CPT | Performed by: INTERNAL MEDICINE

## 2022-04-04 PROCEDURE — 78452 HT MUSCLE IMAGE SPECT MULT: CPT | Performed by: INTERNAL MEDICINE

## 2022-04-04 PROCEDURE — 78452 HT MUSCLE IMAGE SPECT MULT: CPT

## 2022-04-04 PROCEDURE — A9500 TC99M SESTAMIBI: HCPCS | Performed by: INTERNAL MEDICINE

## 2022-04-04 PROCEDURE — 0 TECHNETIUM SESTAMIBI: Performed by: INTERNAL MEDICINE

## 2022-04-04 PROCEDURE — 99214 OFFICE O/P EST MOD 30 MIN: CPT | Performed by: INTERNAL MEDICINE

## 2022-04-04 PROCEDURE — 93017 CV STRESS TEST TRACING ONLY: CPT

## 2022-04-04 RX ORDER — INDAPAMIDE 2.5 MG/1
2.5 TABLET, FILM COATED ORAL EVERY MORNING
Qty: 30 TABLET | Refills: 6 | Status: SHIPPED | OUTPATIENT
Start: 2022-04-04

## 2022-04-04 RX ADMIN — TECHNETIUM TC 99M SESTAMIBI 1 DOSE: 1 INJECTION INTRAVENOUS at 09:00

## 2022-04-04 RX ADMIN — TECHNETIUM TC 99M SESTAMIBI 1 DOSE: 1 INJECTION INTRAVENOUS at 11:18

## 2022-04-04 NOTE — PROGRESS NOTES
cardiolite today   Cardiac clearance- gastro sleeve    Subjective:        Jessica Mcdowell is a 51 y.o. female who here for follow up    CC  BARIATRIC SURG CLEARANCE  LEG SWELLING  HPI  51-year-old female has been complaining of the leg swelling also needs a clearance for the bariatric surgery denies any chest pains or tightness in the chest     Problems Addressed this Visit    None     Visit Diagnoses     Left leg swelling    -  Primary    Relevant Orders    Duplex Venous Lower Extremity - Bilateral CAR (Completed)      Diagnoses       Codes Comments    Left leg swelling    -  Primary ICD-10-CM: M79.89  ICD-9-CM: 729.81         .Interpretation Summary    · Calculated left ventricular EF = 64.6% Estimated left ventricular EF was in agreement with the calculated left ventricular EF.  · Left ventricular diastolic function was normal.  · There is no evidence of pericardial effusion. .         The following portions of the patient's history were reviewed and updated as appropriate: allergies, current medications, past family history, past medical history, past social history, past surgical history and problem list.    Past Medical History:   Diagnosis Date   • Abnormal uterine bleeding (AUB)    • Asthma    • Eczema    • History of anemia    • History of gout    • History of hemorrhoids    • Hypertension    • Leiomyoma    • Murmur, heart    • PONV (postoperative nausea and vomiting)    • Rheumatoid arthritis (HCC) 2/16/2022     reports that she has been smoking. She has been smoking about 0.50 packs per day. She has never used smokeless tobacco. She reports current alcohol use. She reports that she does not use drugs.   Family History   Adopted: Yes   Problem Relation Age of Onset   • Malig Hyperthermia Neg Hx        Review of Systems  Constitutional: No wt loss, fever, fatigue  Gastrointestinal: No nausea, abdominal pain  Behavioral/Psych: No insomnia or anxiety   Cardiovascular leg swelling  Objective:       Physical  "Exam  /97   Pulse 79   Ht 172.7 cm (68\")   Wt 128 kg (283 lb)   LMP 01/08/2018 (Exact Date)   BMI 43.03 kg/m²   General appearance: No acute changes   Neck: Trachea midline; NECK, supple, no thyromegaly or lymphadenopathy   Lungs: Normal size and shape, normal breath sounds, equal distribution of air, no rales and rhonchi   CV: S1-S2 regular, no murmurs, no rub, no gallop   Abdomen: Soft, nontender; no masses , no abnormal abdominal sounds   Extremities: No deformity , normal color , 1+ peripheral edema   Skin: Normal temperature, turgor and texture; no rash, ulcers          Procedures      Echocardiogram:        Current Outpatient Medications:   •  albuterol sulfate  (90 Base) MCG/ACT inhaler, Inhale 2 puffs Every 6 (Six) Hours As Needed for Wheezing or Shortness of Air., Disp: 6.7 g, Rfl: 1  •  ALUMINUM & MAGNESIUM HYDROXIDE PO, Take 1 tablet by mouth 3 (Three) Times a Week. HOLD PRIOR TO SURGERY, Disp: , Rfl:   •  azelastine (ASTELIN) 0.1 % nasal spray, 2 sprays into each nostril Daily., Disp: , Rfl:   •  ferrous sulfate 325 (65 FE) MG tablet, Take 325 mg by mouth Daily With Breakfast. HOLD PRIOR TO SURGERY, Disp: , Rfl:   •  fluticasone (FLONASE) 50 MCG/ACT nasal spray, 1 spray into each nostril As Needed., Disp: , Rfl:   •  hydroCHLOROthiazide (HYDRODIURIL) 25 MG tablet, Take 1 tablet by mouth Daily., Disp: 30 tablet, Rfl: 6  •  lisinopril (PRINIVIL,ZESTRIL) 5 MG tablet, Take 1 tablet by mouth Every Morning., Disp: 30 tablet, Rfl: 6  •  loratadine (CLARITIN) 10 MG tablet, Take 10 mg by mouth Daily., Disp: , Rfl:   •  vitamin B-12 (CYANOCOBALAMIN) 100 MCG tablet, Take 50 mcg by mouth Daily. HOLD PRIOR TO SURGERY, Disp: , Rfl:   •  vitamin C (ASCORBIC ACID) 250 MG tablet, Take 250 mg by mouth 2 (Two) Times a Week., Disp: , Rfl:   No current facility-administered medications for this visit.   Assessment:        Patient Active Problem List   Diagnosis   • Iron deficiency anemia secondary to " inadequate dietary iron intake   • HTN (hypertension)   • Seasonal allergies   • Weight gain   • Chronic fatigue   • Heart murmur   • Asthma   • IBS (irritable bowel syndrome)   • Hemorrhoids   • History of kidney stones   • Chronic pain of both knees   • Rheumatoid arthritis (HCC)   • Gout   • Prediabetes   • History of heart attack   • Obesity, Class III, BMI 40-49.9 (morbid obesity) (HCC)   • Localized edema   • Preoperative testing   • Dietary counseling               Plan:            ICD-10-CM ICD-9-CM   1. Left leg swelling  M79.89 729.81   2. Preoperative evaluation of a medical condition to rule out surgical contraindications (TAR required)  Z01.818 V72.83     1. Left leg swelling  Jessica Mcdowell has been complaining of the leg swelling, appears dependent pedal edema, significantly contributed with a venous insufficiency.    Strong recommendations of elevating the legs as well as using support hoses, along with the control of fluids and salt restriction has been explained in details    - Duplex Venous Lower Extremity - Bilateral CAR; Future    2. Preoperative evaluation of a medical condition to rule out surgical contraindications (TAR required)       Specificity and sensitivity of the stress test/ cardiac workup has been explained. Pt has been explained if  Symptoms continue please go to ER, and further w/p will be required.    Also explained this does not rule out coronary artery disease or the future events, continue to emphasize on risk reductions for coronary artery disease    Pt also advised to contact PCP for other causes of symptoms    Jessica Mcdowell seen and examined with no clinical signs of angina or chf, pt is cleared for surgery with non modifiable risk factors.  Jessica Mcdowell has been advised to take cardiac meds with sip of water on the day of surgery.    Please use beta blocker for tachycardia perioperatively    Anticoagulation to be managed appropriately    Watch for chest pain,  shortness of breath, palpitations, arrhythmias, and significant change in the blood pressure perioperatively,     Please check EKG preop and postop if any questions, notify us if any change in patient's cardiovascular conditions    DC HCTZ    LOZOL 2.5  Pros and cons of this new medication / change medication has been explained to  the patient    Possible side effects has been explained    Associated need of the blood  Work has been explained    Need for the compliance of the medication has been explained    SEE IN 2 WKS    COUNSELING:    Jessica Sultana was given to patient for the following topics: diagnostic results, risk factor reductions, impressions, risks and benefits of treatment options and importance of treatment compliance .       SMOKING COUNSELING:    [unfilled]    Dictated using Dragon dictation

## 2022-04-05 ENCOUNTER — HOSPITAL ENCOUNTER (OUTPATIENT)
Dept: CARDIOLOGY | Facility: HOSPITAL | Age: 52
Discharge: HOME | End: 2022-04-05
Admitting: INTERNAL MEDICINE

## 2022-04-05 DIAGNOSIS — M79.89 LEFT LEG SWELLING: ICD-10-CM

## 2022-04-05 LAB

## 2022-04-05 PROCEDURE — 93970 EXTREMITY STUDY: CPT

## 2022-04-06 ENCOUNTER — OFFICE VISIT (OUTPATIENT)
Dept: CARDIOLOGY | Age: 52
End: 2022-04-06

## 2022-04-06 VITALS
HEART RATE: 74 BPM | DIASTOLIC BLOOD PRESSURE: 87 MMHG | WEIGHT: 283 LBS | BODY MASS INDEX: 42.89 KG/M2 | HEIGHT: 68 IN | SYSTOLIC BLOOD PRESSURE: 139 MMHG

## 2022-04-06 DIAGNOSIS — I10 PRIMARY HYPERTENSION: Primary | ICD-10-CM

## 2022-04-06 DIAGNOSIS — E66.01 OBESITY, CLASS III, BMI 40-49.9 (MORBID OBESITY): ICD-10-CM

## 2022-04-06 PROCEDURE — 99213 OFFICE O/P EST LOW 20 MIN: CPT | Performed by: INTERNAL MEDICINE

## 2022-04-06 NOTE — PROGRESS NOTES
Follow up from doppler    Subjective:        Jessica Mcdowell is a 51 y.o. female who here for follow up    CC  Bariatric surg clearance  LOZOL  HPI  51-year-old female with a benign essential arterial hypertension as well as obesity who recently underwent stress test and echo which are normal patient was also switched from hydrochlorothiazide to the Lozol seems to be working better     Problems Addressed this Visit        Cardiac and Vasculature    HTN (hypertension) - Primary       Endocrine and Metabolic    Obesity, Class III, BMI 40-49.9 (morbid obesity) ([facility])      Diagnoses       Codes Comments    Primary hypertension    -  Primary ICD-10-CM: I10  ICD-9-CM: 401.9     Obesity, Class III, BMI 40-49.9 (morbid obesity) ([facility])     ICD-10-CM: E66.01  ICD-9-CM: 278.01         .Interpretation Summary       · Moderate risk for ischemic heart disease.  · Findings consistent with an equivocal ECG stress test.  · Left ventricular ejection fraction is normal. (Calculated EF = 60%).  · Myocardial perfusion imaging indicates a normal myocardial perfusion study with no evidence of ischemia.  · Impressions are consistent with a low risk study.    Interpretation Summary    · Calculated left ventricular EF = 64.6% Estimated left ventricular EF was in agreement with the calculated left ventricular EF.  · Left ventricular diastolic function was normal.  · There is no evidence of pericardial effusion.    Interpretation Summary    · Normal bilateral lower extremity venous duplex scan.        The following portions of the patient's history were reviewed and updated as appropriate: allergies, current medications, past family history, past medical history, past social history, past surgical history and problem list.    Past Medical History:   Diagnosis Date   • Abnormal uterine bleeding (AUB)    • Asthma    • Eczema    • History of anemia    • History of gout    • History of hemorrhoids    • Hypertension    • Leiomyoma    • Murmur,  "heart    • PONV (postoperative nausea and vomiting)    • Rheumatoid arthritis (HCC) 2/16/2022     reports that she has been smoking. She has been smoking about 0.50 packs per day. She has never used smokeless tobacco. She reports current alcohol use. She reports that she does not use drugs.   Family History   Adopted: Yes   Problem Relation Age of Onset   • Malig Hyperthermia Neg Hx        Review of Systems  Constitutional: No wt loss, fever, fatigue  Gastrointestinal: No nausea, abdominal pain  Behavioral/Psych: No insomnia or anxiety   Cardiovascular no chest pains or tightness in the chest  Objective:       Physical Exam  /87   Pulse 74   Ht 172.7 cm (68\")   Wt 128 kg (283 lb)   LMP 01/08/2018 (Exact Date)   BMI 43.03 kg/m²   General appearance: No acute changes   Neck: Trachea midline; NECK, supple, no thyromegaly or lymphadenopathy   Lungs: Normal size and shape, normal breath sounds, equal distribution of air, no rales and rhonchi   CV: S1-S2 regular, no murmurs, no rub, no gallop   Abdomen: Soft, nontender; no masses , no abnormal abdominal sounds   Extremities: No deformity , normal color , no peripheral edema   Skin: Normal temperature, turgor and texture; no rash, ulcers          Procedures      Echocardiogram:        Current Outpatient Medications:   •  albuterol sulfate  (90 Base) MCG/ACT inhaler, Inhale 2 puffs Every 6 (Six) Hours As Needed for Wheezing or Shortness of Air., Disp: 6.7 g, Rfl: 1  •  ALUMINUM & MAGNESIUM HYDROXIDE PO, Take 1 tablet by mouth 3 (Three) Times a Week. HOLD PRIOR TO SURGERY, Disp: , Rfl:   •  azelastine (ASTELIN) 0.1 % nasal spray, 2 sprays into each nostril Daily., Disp: , Rfl:   •  ferrous sulfate 325 (65 FE) MG tablet, Take 325 mg by mouth Daily With Breakfast. HOLD PRIOR TO SURGERY, Disp: , Rfl:   •  fluticasone (FLONASE) 50 MCG/ACT nasal spray, 1 spray into each nostril As Needed., Disp: , Rfl:   •  indapamide (LOZOL) 2.5 MG tablet, Take 1 tablet by " mouth Every Morning., Disp: 30 tablet, Rfl: 6  •  lisinopril (PRINIVIL,ZESTRIL) 5 MG tablet, Take 1 tablet by mouth Every Morning., Disp: 30 tablet, Rfl: 6  •  loratadine (CLARITIN) 10 MG tablet, Take 10 mg by mouth Daily., Disp: , Rfl:   •  vitamin B-12 (CYANOCOBALAMIN) 100 MCG tablet, Take 50 mcg by mouth Daily. HOLD PRIOR TO SURGERY, Disp: , Rfl:   •  vitamin C (ASCORBIC ACID) 250 MG tablet, Take 250 mg by mouth 2 (Two) Times a Week., Disp: , Rfl:    Assessment:        Patient Active Problem List   Diagnosis   • Iron deficiency anemia secondary to inadequate dietary iron intake   • HTN (hypertension)   • Seasonal allergies   • Weight gain   • Chronic fatigue   • Heart murmur   • Asthma   • IBS (irritable bowel syndrome)   • Hemorrhoids   • History of kidney stones   • Chronic pain of both knees   • Rheumatoid arthritis (HCC)   • Gout   • Prediabetes   • History of heart attack   • Obesity, Class III, BMI 40-49.9 (morbid obesity) ([facility])   • Localized edema   • Preoperative testing   • Dietary counseling               Plan:            ICD-10-CM ICD-9-CM   1. Primary hypertension  I10 401.9   2. Obesity, Class III, BMI 40-49.9 (morbid obesity) ([facility])  E66.01 278.01     1. Primary hypertension  Blood pressure under control    2. Obesity, Class III, BMI 40-49.9 (morbid obesity) ([facility])  Cleared for the surgery       Specificity and sensitivity of the stress test/ cardiac workup has been explained. Pt has been explained if  Symptoms continue please go to ER, and further w/p will be required.    Also explained this does not rule out coronary artery disease or the future events, continue to emphasize on risk reductions for coronary artery disease    Pt also advised to contact PCP for other causes of symptoms    Jessica DE LUNA July seen and examined with no clinical signs of angina or chf, pt is cleared for surgery with non modifiable risk factors.  Jessica Mcdowell has been advised to take cardiac meds with sip of water on  the day of surgery.    Please use beta blocker for tachycardia perioperatively    Anticoagulation to be managed appropriately    Watch for chest pain, shortness of breath, palpitations, arrhythmias, and significant change in the blood pressure perioperatively,     Please check EKG preop and postop if any questions, notify us if any change in patient's cardiovascular conditions    1 YR  FOLLOW UP LOZOL WITH PCP    COUNSELING:    Jessica Y NuckolsCounseling was given to patient for the following topics: diagnostic results, risk factor reductions, impressions, risks and benefits of treatment options and importance of treatment compliance .       SMOKING COUNSELING:    [unfilled]    Dictated using Dragon dictation

## 2022-04-08 ENCOUNTER — HOSPITAL ENCOUNTER (EMERGENCY)
Facility: HOSPITAL | Age: 52
Discharge: HOME | End: 2022-04-08
Attending: EMERGENCY MEDICINE | Admitting: EMERGENCY MEDICINE

## 2022-04-08 ENCOUNTER — APPOINTMENT (OUTPATIENT)
Dept: GENERAL RADIOLOGY | Facility: HOSPITAL | Age: 52
End: 2022-04-08

## 2022-04-08 VITALS
HEIGHT: 68 IN | OXYGEN SATURATION: 96 % | WEIGHT: 277 LBS | RESPIRATION RATE: 16 BRPM | BODY MASS INDEX: 41.98 KG/M2 | DIASTOLIC BLOOD PRESSURE: 99 MMHG | HEART RATE: 66 BPM | SYSTOLIC BLOOD PRESSURE: 135 MMHG | TEMPERATURE: 96.3 F

## 2022-04-08 DIAGNOSIS — J06.9 VIRAL URI: Primary | ICD-10-CM

## 2022-04-08 DIAGNOSIS — Z87.09 HISTORY OF ASTHMA: ICD-10-CM

## 2022-04-08 LAB
ALBUMIN SERPL-MCNC: 4.4 G/DL (ref 3.5–5.2)
ALBUMIN/GLOB SERPL: 1.4 G/DL
ALP SERPL-CCNC: 82 U/L (ref 39–117)
ALT SERPL W P-5'-P-CCNC: 21 U/L (ref 1–33)
ANION GAP SERPL CALCULATED.3IONS-SCNC: 13.2 MMOL/L (ref 5–15)
AST SERPL-CCNC: 27 U/L (ref 1–32)
B PARAPERT DNA SPEC QL NAA+PROBE: NOT DETECTED
B PERT DNA SPEC QL NAA+PROBE: NOT DETECTED
BASOPHILS # BLD AUTO: 0.03 10*3/MM3 (ref 0–0.2)
BASOPHILS NFR BLD AUTO: 0.5 % (ref 0–1.5)
BILIRUB SERPL-MCNC: 0.6 MG/DL (ref 0–1.2)
BUN SERPL-MCNC: 10 MG/DL (ref 6–20)
BUN/CREAT SERPL: 11.9 (ref 7–25)
C PNEUM DNA NPH QL NAA+NON-PROBE: NOT DETECTED
CALCIUM SPEC-SCNC: 9.8 MG/DL (ref 8.6–10.5)
CHLORIDE SERPL-SCNC: 102 MMOL/L (ref 98–107)
CO2 SERPL-SCNC: 24.8 MMOL/L (ref 22–29)
CREAT SERPL-MCNC: 0.84 MG/DL (ref 0.57–1)
D-LACTATE SERPL-SCNC: 1 MMOL/L (ref 0.5–2)
DEPRECATED RDW RBC AUTO: 43.2 FL (ref 37–54)
EGFRCR SERPLBLD CKD-EPI 2021: 85.3 ML/MIN/1.73
EOSINOPHIL # BLD AUTO: 0.1 10*3/MM3 (ref 0–0.4)
EOSINOPHIL NFR BLD AUTO: 1.7 % (ref 0.3–6.2)
ERYTHROCYTE [DISTWIDTH] IN BLOOD BY AUTOMATED COUNT: 14.6 % (ref 12.3–15.4)
FLUAV SUBTYP SPEC NAA+PROBE: NOT DETECTED
FLUBV RNA ISLT QL NAA+PROBE: NOT DETECTED
GLOBULIN UR ELPH-MCNC: 3.1 GM/DL
GLUCOSE SERPL-MCNC: 92 MG/DL (ref 65–99)
HADV DNA SPEC NAA+PROBE: NOT DETECTED
HCOV 229E RNA SPEC QL NAA+PROBE: NOT DETECTED
HCOV HKU1 RNA SPEC QL NAA+PROBE: NOT DETECTED
HCOV NL63 RNA SPEC QL NAA+PROBE: NOT DETECTED
HCOV OC43 RNA SPEC QL NAA+PROBE: NOT DETECTED
HCT VFR BLD AUTO: 37.4 % (ref 34–46.6)
HGB BLD-MCNC: 12.2 G/DL (ref 12–15.9)
HMPV RNA NPH QL NAA+NON-PROBE: NOT DETECTED
HPIV1 RNA ISLT QL NAA+PROBE: NOT DETECTED
HPIV2 RNA SPEC QL NAA+PROBE: NOT DETECTED
HPIV3 RNA NPH QL NAA+PROBE: NOT DETECTED
HPIV4 P GENE NPH QL NAA+PROBE: NOT DETECTED
IMM GRANULOCYTES # BLD AUTO: 0.02 10*3/MM3 (ref 0–0.05)
IMM GRANULOCYTES NFR BLD AUTO: 0.3 % (ref 0–0.5)
LYMPHOCYTES # BLD AUTO: 1.35 10*3/MM3 (ref 0.7–3.1)
LYMPHOCYTES NFR BLD AUTO: 22.5 % (ref 19.6–45.3)
M PNEUMO IGG SER IA-ACNC: NOT DETECTED
MCH RBC QN AUTO: 27.1 PG (ref 26.6–33)
MCHC RBC AUTO-ENTMCNC: 32.6 G/DL (ref 31.5–35.7)
MCV RBC AUTO: 82.9 FL (ref 79–97)
MONOCYTES # BLD AUTO: 0.6 10*3/MM3 (ref 0.1–0.9)
MONOCYTES NFR BLD AUTO: 10 % (ref 5–12)
NEUTROPHILS NFR BLD AUTO: 3.89 10*3/MM3 (ref 1.7–7)
NEUTROPHILS NFR BLD AUTO: 65 % (ref 42.7–76)
NRBC BLD AUTO-RTO: 0.2 /100 WBC (ref 0–0.2)
PLATELET # BLD AUTO: 160 10*3/MM3 (ref 140–450)
PMV BLD AUTO: 12.5 FL (ref 6–12)
POTASSIUM SERPL-SCNC: 3.6 MMOL/L (ref 3.5–5.2)
PROT SERPL-MCNC: 7.5 G/DL (ref 6–8.5)
RBC # BLD AUTO: 4.51 10*6/MM3 (ref 3.77–5.28)
RHINOVIRUS RNA SPEC NAA+PROBE: NOT DETECTED
RSV RNA NPH QL NAA+NON-PROBE: NOT DETECTED
SARS-COV-2 RNA NPH QL NAA+NON-PROBE: NOT DETECTED
SODIUM SERPL-SCNC: 140 MMOL/L (ref 136–145)
WBC NRBC COR # BLD: 5.99 10*3/MM3 (ref 3.4–10.8)

## 2022-04-08 PROCEDURE — 80053 COMPREHEN METABOLIC PANEL: CPT | Performed by: NURSE PRACTITIONER

## 2022-04-08 PROCEDURE — 83605 ASSAY OF LACTIC ACID: CPT | Performed by: NURSE PRACTITIONER

## 2022-04-08 PROCEDURE — 94640 AIRWAY INHALATION TREATMENT: CPT

## 2022-04-08 PROCEDURE — 71045 X-RAY EXAM CHEST 1 VIEW: CPT

## 2022-04-08 PROCEDURE — 25010000002 METHYLPREDNISOLONE PER 125 MG: Performed by: NURSE PRACTITIONER

## 2022-04-08 PROCEDURE — 85025 COMPLETE CBC W/AUTO DIFF WBC: CPT | Performed by: NURSE PRACTITIONER

## 2022-04-08 PROCEDURE — 94799 UNLISTED PULMONARY SVC/PX: CPT

## 2022-04-08 PROCEDURE — 36415 COLL VENOUS BLD VENIPUNCTURE: CPT

## 2022-04-08 PROCEDURE — 96374 THER/PROPH/DIAG INJ IV PUSH: CPT

## 2022-04-08 PROCEDURE — 99283 EMERGENCY DEPT VISIT LOW MDM: CPT

## 2022-04-08 PROCEDURE — 0202U NFCT DS 22 TRGT SARS-COV-2: CPT | Performed by: NURSE PRACTITIONER

## 2022-04-08 RX ORDER — ALBUTEROL SULFATE 90 UG/1
2 AEROSOL, METERED RESPIRATORY (INHALATION) EVERY 6 HOURS PRN
Qty: 6.7 G | Refills: 0 | Status: SHIPPED | OUTPATIENT
Start: 2022-04-08

## 2022-04-08 RX ORDER — ALBUTEROL SULFATE 1.25 MG/3ML
1 SOLUTION RESPIRATORY (INHALATION) EVERY 6 HOURS PRN
Qty: 60 EACH | Refills: 0 | Status: SHIPPED | OUTPATIENT
Start: 2022-04-08

## 2022-04-08 RX ORDER — ALBUTEROL SULFATE 2.5 MG/3ML
2.5 SOLUTION RESPIRATORY (INHALATION) ONCE
Status: COMPLETED | OUTPATIENT
Start: 2022-04-08 | End: 2022-04-08

## 2022-04-08 RX ORDER — METHYLPREDNISOLONE SODIUM SUCCINATE 125 MG/2ML
125 INJECTION, POWDER, LYOPHILIZED, FOR SOLUTION INTRAMUSCULAR; INTRAVENOUS ONCE
Status: COMPLETED | OUTPATIENT
Start: 2022-04-08 | End: 2022-04-08

## 2022-04-08 RX ORDER — SODIUM CHLORIDE 0.9 % (FLUSH) 0.9 %
10 SYRINGE (ML) INJECTION AS NEEDED
Status: DISCONTINUED | OUTPATIENT
Start: 2022-04-08 | End: 2022-04-08 | Stop reason: HOSPADM

## 2022-04-08 RX ORDER — PREDNISONE 20 MG/1
40 TABLET ORAL DAILY
Qty: 10 TABLET | Refills: 0 | Status: SHIPPED | OUTPATIENT
Start: 2022-04-08 | End: 2022-04-13

## 2022-04-08 RX ADMIN — ALBUTEROL SULFATE 2.5 MG: 2.5 SOLUTION RESPIRATORY (INHALATION) at 12:54

## 2022-04-08 RX ADMIN — METHYLPREDNISOLONE SODIUM SUCCINATE 125 MG: 125 INJECTION, POWDER, FOR SOLUTION INTRAMUSCULAR; INTRAVENOUS at 12:34

## 2022-04-08 NOTE — ED PROVIDER NOTES
EMERGENCY DEPARTMENT ENCOUNTER    Room Number:  09/09  Date seen:  4/8/2022  Time seen: 12:18 EDT  PCP: Jun Aguillon MD  Historian: Patient    HPI:  Chief complaint: Shortness of breath, history of asthma  A complete HPI/ROS/PMH/PSH/SH/FH are unobtainable due to: Not applicable   context:Jessica Mcdowell is a 51 y.o. female with history of asthma, seasonal allergies, IBS, hypertension and rheumatoid arthritis who presents to the ED with c/o 1 week of mild shortness of breath thought to be asthma that became significantly worse last night where she reported having difficulty breathing and that her home inhaler was not helping.  Her symptoms are not made better by anything and are worse with exertion.  She has some associated very mild fatigue but denies any GI symptoms.  She has had this problem in the past and had walking pneumonia and she is concerned for that today.  She has had 2 vaccines for COVID and denies any body aches or headache.    Patient was placed in face mask in first look. Patient was wearing facemask when I entered the room and throughout our encounter. I wore full protective equipment throughout this patient encounter including a N95 face mask, eye shield and gloves. Hand hygiene/washing of hands was performed before donning protective equipment and after removal when leaving the room.    MEDICAL RECORD REVIEW    ALLERGIES  Hdirvpvqi-leovqbweo-ntvthmgqn, Penicillins, Shellfish-derived products, and Iodine    PAST MEDICAL HISTORY  Active Ambulatory Problems     Diagnosis Date Noted   • Iron deficiency anemia secondary to inadequate dietary iron intake 05/22/2012   • HTN (hypertension) 12/29/2020   • Seasonal allergies 02/16/2022   • Weight gain 02/16/2022   • Chronic fatigue 02/16/2022   • Heart murmur 02/16/2022   • Asthma 02/16/2022   • IBS (irritable bowel syndrome) 02/16/2022   • Hemorrhoids 02/16/2022   • History of kidney stones 02/16/2022   • Chronic pain of both knees 02/16/2022   •  Rheumatoid arthritis (HCC) 2022   • Gout 2022   • Prediabetes 2022   • History of heart attack 2022   • Obesity, Class III, BMI 40-49.9 (morbid obesity) ([facility]) 2022   • Localized edema 2022   • Preoperative testing 2022   • Dietary counseling 2022     Resolved Ambulatory Problems     Diagnosis Date Noted   • Abnormal uterine bleeding 01/15/2018     Past Medical History:   Diagnosis Date   • Abnormal uterine bleeding (AUB)    • Eczema    • History of anemia    • History of gout    • History of hemorrhoids    • Hypertension    • Leiomyoma    • Murmur, heart    • PONV (postoperative nausea and vomiting)        PAST SURGICAL HISTORY  Past Surgical History:   Procedure Laterality Date   • ABDOMINOPLASTY     •  SECTION      x2   • COLONOSCOPY     • HEMORRHOIDECTOMY     • HYSTERECTOMY SUPRACERVICAL Bilateral 1/15/2018    Procedure: LAPAROSCOPIC SUPRACERVICAL HYSTERECTOMY WITH DAVINCI ROBOT BILATERAL SALPINGECTOMY;  Surgeon: Brigitte Roldan MD;  Location: [facility];  Service:    • LIPOMA EXCISION      back   • WISDOM TOOTH EXTRACTION      THREE       FAMILY HISTORY  Family History   Adopted: Yes   Problem Relation Age of Onset   • Malig Hyperthermia Neg Hx        SOCIAL HISTORY  Social History     Socioeconomic History   • Marital status: Single   Tobacco Use   • Smoking status: Current Every Day Smoker     Packs/day: 0.50   • Smokeless tobacco: Never Used   Vaping Use   • Vaping Use: Never used   Substance and Sexual Activity   • Alcohol use: Yes     Comment: MONTHLY 1-2 DRINKS   • Drug use: No   • Sexual activity: Defer     Birth control/protection: Condom, Surgical       REVIEW OF SYSTEMS  Review of Systems    All systems reviewed and negative except for those discussed in HPI.     PHYSICAL EXAM    ED Triage Vitals [22 1202]   Temp Heart Rate Resp BP SpO2   96.3 °F (35.7 °C) 74 16 -- 95 %      Temp src Heart Rate Source Patient Position BP Location FiO2 (%)    Tympanic Monitor -- -- --     Physical Exam    I have reviewed the triage vital signs and nursing notes.      GENERAL: not distressed  HENT: nares patent, mm moist  EYES: no scleral icterus  NECK: no ROM limitations  CV: regular rhythm, regular rate, no murmur, no rubs, no gallups  RESPIRATORY: normal effort.  Pt with very decreased breath sounds on right.  No wheezing/rales.  She is able to speak in full sentences  ABDOMEN: soft  : deferred  MUSCULOSKELETAL: no deformity  NEURO: alert, moves all extremities, follows commands  SKIN: warm, dry    LAB RESULTS  Recent Results (from the past 24 hour(s))   Comprehensive Metabolic Panel    Collection Time: 04/08/22 12:34 PM    Specimen: Blood   Result Value Ref Range    Glucose 92 65 - 99 mg/dL    BUN 10 6 - 20 mg/dL    Creatinine 0.84 0.57 - 1.00 mg/dL    Sodium 140 136 - 145 mmol/L    Potassium 3.6 3.5 - 5.2 mmol/L    Chloride 102 98 - 107 mmol/L    CO2 24.8 22.0 - 29.0 mmol/L    Calcium 9.8 8.6 - 10.5 mg/dL    Total Protein 7.5 6.0 - 8.5 g/dL    Albumin 4.40 3.50 - 5.20 g/dL    ALT (SGPT) 21 1 - 33 U/L    AST (SGOT) 27 1 - 32 U/L    Alkaline Phosphatase 82 39 - 117 U/L    Total Bilirubin 0.6 0.0 - 1.2 mg/dL    Globulin 3.1 gm/dL    A/G Ratio 1.4 g/dL    BUN/Creatinine Ratio 11.9 7.0 - 25.0    Anion Gap 13.2 5.0 - 15.0 mmol/L    eGFR 85.3 >60.0 mL/min/1.73   Lactic Acid, Plasma    Collection Time: 04/08/22 12:34 PM    Specimen: Blood   Result Value Ref Range    Lactate 1.0 0.5 - 2.0 mmol/L   CBC Auto Differential    Collection Time: 04/08/22 12:34 PM    Specimen: Blood   Result Value Ref Range    WBC 5.99 3.40 - 10.80 10*3/mm3    RBC 4.51 3.77 - 5.28 10*6/mm3    Hemoglobin 12.2 12.0 - 15.9 g/dL    Hematocrit 37.4 34.0 - 46.6 %    MCV 82.9 79.0 - 97.0 fL    MCH 27.1 26.6 - 33.0 pg    MCHC 32.6 31.5 - 35.7 g/dL    RDW 14.6 12.3 - 15.4 %    RDW-SD 43.2 37.0 - 54.0 fl    MPV 12.5 (H) 6.0 - 12.0 fL    Platelets 160 140 - 450 10*3/mm3    Neutrophil % 65.0 42.7 - 76.0 %     Lymphocyte % 22.5 19.6 - 45.3 %    Monocyte % 10.0 5.0 - 12.0 %    Eosinophil % 1.7 0.3 - 6.2 %    Basophil % 0.5 0.0 - 1.5 %    Immature Grans % 0.3 0.0 - 0.5 %    Neutrophils, Absolute 3.89 1.70 - 7.00 10*3/mm3    Lymphocytes, Absolute 1.35 0.70 - 3.10 10*3/mm3    Monocytes, Absolute 0.60 0.10 - 0.90 10*3/mm3    Eosinophils, Absolute 0.10 0.00 - 0.40 10*3/mm3    Basophils, Absolute 0.03 0.00 - 0.20 10*3/mm3    Immature Grans, Absolute 0.02 0.00 - 0.05 10*3/mm3    nRBC 0.2 0.0 - 0.2 /100 WBC   Respiratory Panel PCR w/COVID-19(SARS-CoV-2) ROSETTE/DANA/PADMINI/PAD/COR/MAD/MADELIN In-House, NP Swab in UTM/VTM, 3-4 HR TAT - Swab, Nasopharynx    Collection Time: 04/08/22 12:39 PM    Specimen: Nasopharynx; Swab   Result Value Ref Range    ADENOVIRUS, PCR Not Detected Not Detected    Coronavirus 229E Not Detected Not Detected    Coronavirus HKU1 Not Detected Not Detected    Coronavirus NL63 Not Detected Not Detected    Coronavirus OC43 Not Detected Not Detected    COVID19 Not Detected Not Detected - Ref. Range    Human Metapneumovirus Not Detected Not Detected    Human Rhinovirus/Enterovirus Not Detected Not Detected    Influenza A PCR Not Detected Not Detected    Influenza B PCR Not Detected Not Detected    Parainfluenza Virus 1 Not Detected Not Detected    Parainfluenza Virus 2 Not Detected Not Detected    Parainfluenza Virus 3 Not Detected Not Detected    Parainfluenza Virus 4 Not Detected Not Detected    RSV, PCR Not Detected Not Detected    Bordetella pertussis pcr Not Detected Not Detected    Bordetella parapertussis PCR Not Detected Not Detected    Chlamydophila pneumoniae PCR Not Detected Not Detected    Mycoplasma pneumo by PCR Not Detected Not Detected         RADIOLOGY RESULTS  XR Chest 1 View    Result Date: 4/8/2022  STAT PORTABLE RADIOGRAPHIC VIEW OF THE CHEST  CLINICAL HISTORY: Shortness of breath.  FINDINGS:  Stat portable radiographic view of the chest demonstrates clear lungs. The cardiomediastinal silhouette is  unremarkable. The osseous structures are within normal limits.       No active disease in the chest.  This report was finalized on 4/8/2022 1:00 PM by Dr. Dario Martins M.D.           PROGRESS, DATA ANALYSIS, CONSULTS AND MEDICAL DECISION MAKING  All labs have been independently reviewed by me.  All radiology studies have been reviewed by me and discussed with radiologist dictating the report.  EKG's independently viewed and interpreted by me unless stated otherwise. Discussion below represents my analysis of pertinent findings related to patient's condition, differential diagnosis, treatment plan and final disposition.     ED Course as of 04/08/22 1353   Fri Apr 08, 2022   1221 I discussed plan with patient to check lab work, chest x-ray and give breathing treatment and steroids.    DDx: Viral URI, pneumonia, asthma exacerbation [EW]   1311 I view chest x-ray in PACS.  My interpretation are no focal infiltrates. [EW]   1335 Patient is feeling better after nebulized breathing treatment.  She would like nebulizer for home and is aware of how to use it.  I have discussed this with [facility] who will get her the equipment.  I discussed with patient that I will call her later today with the results of the viral RVP.  Her chest x-ray is not acute and she is not hypoxic. [EW]   1352 RVP negative.  Pt informed [EW]      ED Course User Index  [EW] Chrissy Munson, AARTI       Reviewed pt's history and workup with Dr. Ryan.  After a bedside evaluation, Dr. Ryan agrees with the plan of care.    The patient's history, physical exam, and lab findings were discussed with the physician, who also performed a face to face history and physical exam.  I discussed all results and noted any abnormalities with patient.  Discussed absoute need to recheck abnormalities with their family physician.  I answered any of the patient's questions.  Discussed plan for discharge, as there is no emergent indication for admission.  Pt is agreeable  "and understands need for follow up and repeat testing.  Pt is aware that discharge does not mean that nothing is wrong but it indicates no emergency is present and they must continue care with their family physician.  Pt is discharged with instructions to follow up with primary care doctor to have their blood pressure rechecked.         Disposition vitals:  /91   Pulse 66   Temp 96.3 °F (35.7 °C) (Tympanic)   Resp 16   Ht 172.7 cm (68\")   Wt 126 kg (277 lb)   LMP 01/08/2018 (Exact Date)   SpO2 99%   BMI 42.12 kg/m²       DIAGNOSIS  Final diagnoses:   Viral URI   History of asthma       FOLLOW UP   Jun Aguillon MD  [address] [id number] [phone]    Schedule an appointment as soon as possible for a visit in 1 week  As needed         Chrissy Munson, AARTI  04/08/22 1337       Chrissy Munson, AARTI  04/08/22 1353    "

## 2022-04-08 NOTE — DISCHARGE INSTRUCTIONS
Wash/sanitize common household surfaces with antibacterial wipes.  Especially door knobs, light switches.    Change bed linens and wash bath towels/washcloths    Frequent handwashing    Cough/sneeze into your sleeve    Treat fever every 6-8 hours with adult Tylenol (generic acetaminophen) or Ibuprofen according to package directions.    Although you are being discharged from the ED today, I encourage you to return for worsening symptoms. Things can, and do, change such that treatment at home with medication may not be adequate. Specifically I recommend returning for chest pain or discomfort, difficulty breathing, persistent vomiting or difficulty holding down liquids or medications, fever > 102.0 F, or any other worsening or alarming symptoms.     Rest. Drink plenty of fluids.  Follow up with PCP or provider listed for further evaluation and management.  Follow up with primary care provider for further management and to have blood pressure rechecked.  Take all medications as prescribed.

## 2022-04-08 NOTE — CASE MANAGEMENT/SOCIAL WORK
Per order, provided patient w/ home nebulizer from [employer]. RN to provide teaching. Patient signed agreeement and copy given to her

## 2022-04-08 NOTE — ED PROVIDER NOTES
MD ATTESTATION NOTE    The SILVIA and I have discussed this patient's history, physical exam, and treatment plan.  I have reviewed the documentation and personally had a face to face interaction with the patient. I affirm the documentation and agree with the treatment and plan.  The attached note describes my personal findings.      I provided a substantive portion of the care of the patient.  I personally performed the physical exam in its entirety, and below are my findings.  For this patient encounter, the patient wore surgical mask, I wore full protective PPE including N95 and eye protection.      Brief HPI: Patient complains of shortness of breath for the past few days.  Symptoms worsen last night.  She has a history of asthma and used her inhaler without relief.  Denies chest pain, fever, nausea, vomiting, abdominal pain, leg pain, or leg swelling.    PHYSICAL EXAM  ED Triage Vitals   Temp Heart Rate Resp BP SpO2   04/08/22 1202 04/08/22 1202 04/08/22 1202 04/08/22 1213 04/08/22 1202   96.3 °F (35.7 °C) 74 16 152/92 95 %      Temp src Heart Rate Source Patient Position BP Location FiO2 (%)   04/08/22 1202 04/08/22 1202 04/08/22 1301 -- --   Tympanic Monitor Lying           GENERAL: Awake, alert, oriented x3.  Well-developed, well-nourished female.  Resting comfortably in no acute distress  HENT: nares patent  EYES: no scleral icterus  CV: regular rhythm, normal rate  RESPIRATORY: normal effort, clear to auscultation bilaterally  ABDOMEN: soft, nontender  MUSCULOSKELETAL: no deformity, no calf tenderness, no pedal edema  NEURO: alert, moves all extremities, follows commands  PSYCH:  calm, cooperative  SKIN: warm, dry    Vital signs and nursing notes reviewed.        Plan: Labs and chest x-ray are unremarkable.  Respiratory panel is pending.  Patient was given Solu-Medrol and nebulizer treatment.  She will be given a nebulizer to take home.     Tiago Ryan MD  04/08/22 4445

## 2022-04-08 NOTE — ED TRIAGE NOTES
Pt has been wheezing and last night she couldn't get relief from her inhaler.  Hx asthma    Patient was placed in face mask during first look triage.  Patient was wearing a face mask throughout encounter.  I wore personal protective equipment throughout the encounter.  Hand hygiene was performed before and after patient encounter.

## 2022-04-17 PROBLEM — M79.89 LEFT LEG SWELLING: Status: ACTIVE | Noted: 2022-04-17

## 2022-04-17 PROBLEM — Z01.818 PREOPERATIVE EVALUATION OF A MEDICAL CONDITION TO RULE OUT SURGICAL CONTRAINDICATIONS (TAR REQUIRED): Status: ACTIVE | Noted: 2022-02-16

## 2022-05-03 ENCOUNTER — OFFICE VISIT (OUTPATIENT)
Dept: BARIATRICS/WEIGHT MGMT | Facility: CLINIC | Age: 52
End: 2022-05-03

## 2022-05-03 VITALS
BODY MASS INDEX: 41.68 KG/M2 | SYSTOLIC BLOOD PRESSURE: 148 MMHG | TEMPERATURE: 97.5 F | DIASTOLIC BLOOD PRESSURE: 96 MMHG | RESPIRATION RATE: 18 BRPM | HEART RATE: 65 BPM | WEIGHT: 275 LBS | HEIGHT: 68 IN

## 2022-05-03 DIAGNOSIS — M25.562 CHRONIC PAIN OF BOTH KNEES: ICD-10-CM

## 2022-05-03 DIAGNOSIS — D50.8 IRON DEFICIENCY ANEMIA SECONDARY TO INADEQUATE DIETARY IRON INTAKE: ICD-10-CM

## 2022-05-03 DIAGNOSIS — M25.561 CHRONIC PAIN OF BOTH KNEES: ICD-10-CM

## 2022-05-03 DIAGNOSIS — R73.03 PREDIABETES: ICD-10-CM

## 2022-05-03 DIAGNOSIS — J45.20 MILD INTERMITTENT ASTHMA WITHOUT COMPLICATION: ICD-10-CM

## 2022-05-03 DIAGNOSIS — E66.01 OBESITY, CLASS III, BMI 40-49.9 (MORBID OBESITY): Primary | ICD-10-CM

## 2022-05-03 DIAGNOSIS — G89.29 CHRONIC PAIN OF BOTH KNEES: ICD-10-CM

## 2022-05-03 DIAGNOSIS — M79.89 LEFT LEG SWELLING: ICD-10-CM

## 2022-05-03 DIAGNOSIS — I10 PRIMARY HYPERTENSION: ICD-10-CM

## 2022-05-03 DIAGNOSIS — R53.82 CHRONIC FATIGUE: ICD-10-CM

## 2022-05-03 PROCEDURE — 99213 OFFICE O/P EST LOW 20 MIN: CPT | Performed by: NURSE PRACTITIONER

## 2022-05-03 RX ORDER — FERROUS FUMARATE/ASCORBIC ACID 65MG-25 MG
50 TABLET, EXTENDED RELEASE ORAL 3 TIMES DAILY
COMMUNITY
End: 2022-08-01

## 2022-05-03 NOTE — PROGRESS NOTES
[facility]  [address] [id number] [phone]  [address] [id number] [phone]  Dept: [phone]  5/3/2022      Jessica Mcdowell.  [id number]  [id number]  1970  female      Chief Complaint   Patient presents with   • Follow-up     Diet 3/3       The patient is here for month 3 of their pre-operative physician supervised diet. Today's weight is 125 kg (275 lb) pounds and had a loss of 2 lbs. The patient states that she is following the recommendations given by our office and dietician including a high lean protein, low carb and low fat diet. We recommended adequate fruits and vegetable intake along with limited portion sizes. Patient is working on eliminating fast foods, fried foods, sweets and soda. Jessica Mcdowell has been increasing her daily water intake. She has been exercising: by walking.    Patient states they have made positive changes including she as renewed her gym membership.  She has cut out soda and increased her water intake.  She has decreased the amount of sweets that she has been eating.  She has decreased fast foods.  She is eating salad 4-5 times per week and trying to increase the amount of sleep she is getting.  She has started walking more as well.      Review of Systems   Constitutional: Positive for activity change and appetite change.   Respiratory: Negative for chest tightness and shortness of breath.    Cardiovascular: Negative for chest pain and palpitations.   Gastrointestinal: Negative for abdominal pain.   All other systems reviewed and are negative.    Vitals:    05/03/22 1036   BP: 148/96   Pulse: 65   Resp: 18   Temp: 97.5 °F (36.4 °C)     Patient Active Problem List   Diagnosis   • Iron deficiency anemia secondary to inadequate dietary iron intake   • HTN (hypertension)   • Seasonal allergies   • Weight gain   • Chronic fatigue   • Heart murmur   • Asthma    • IBS (irritable bowel syndrome)   • Hemorrhoids   • History of kidney stones   • Chronic pain of both knees   • Rheumatoid arthritis (HCC)   • Gout   • Prediabetes   • History of heart attack   • Obesity, Class III, BMI 40-49.9 (morbid obesity) (HCC)   • Localized edema   • Preoperative testing   • Preoperative evaluation of a medical condition to rule out surgical contraindications (TAR required)   • Left leg swelling     Body mass index is 41.68 kg/m².    The following portions of the patient's history were reviewed and updated as appropriate: active problem list, medication list, allergies, notes from last encounter, lab results, imaging    Physical Exam  Vitals reviewed.   Constitutional:       General: She is not in acute distress.     Appearance: Normal appearance. She is morbidly obese.   HENT:      Head: Normocephalic and atraumatic.      Right Ear: External ear normal.      Left Ear: External ear normal.      Nose: Nose normal.      Mouth/Throat:      Mouth: Mucous membranes are moist.      Pharynx: Oropharynx is clear.   Eyes:      General: No scleral icterus.     Extraocular Movements: Extraocular movements intact.      Conjunctiva/sclera: Conjunctivae normal.      Pupils: Pupils are equal, round, and reactive to light.   Cardiovascular:      Rate and Rhythm: Normal rate and regular rhythm.      Heart sounds: Normal heart sounds. No murmur heard.    No gallop.   Pulmonary:      Effort: Pulmonary effort is normal. No respiratory distress.   Abdominal:      General: Bowel sounds are normal.      Palpations: Abdomen is soft.   Musculoskeletal:         General: Normal range of motion.      Cervical back: Normal range of motion and neck supple.   Skin:     General: Skin is warm and dry.   Neurological:      General: No focal deficit present.      Mental Status: She is alert and oriented to person, place, and time.   Psychiatric:         Mood and Affect: Mood normal.         Behavior: Behavior normal.          Thought Content: Thought content normal.         Judgment: Judgment normal.         Discussion/Plan:  Obesity/Morbid Obesity: Currently the patient's weight is decreasing. There are no medications prescribed.Treatment plan includes prescribed diet, prescribed exercise regimen and behavior modification.    I reviewed the appropriate dietary choices with the patient and encouraged the necessary changes. Recommended at least 70 grams of protein per day, around 35 grams of fats and less than 100 grams of carbohydrates. Reviewed calorie intake if patient wanted to calorie count and/or had BMR. Instructed patient to drink half of body weight in ounces per day and exercise a minimum of 150 minutes per week including both cardio and strength training. Discussed the option of keeping a food journal which will help patient become more aware of the nutritional value of foods so they are more prepared after surgery.    The patient was given written materials from our office for education.   I answered all of the patients questions regarding dietary changes, exercise or surgical options.  The patient will follow up in 1 month. The total time spent during this encounter was 25 minutes    Encounter Diagnoses   Name Primary?   • Obesity, Class III, BMI 40-49.9 (morbid obesity) ([facility]) Yes   • Prediabetes    • Primary hypertension    • Iron deficiency anemia secondary to inadequate dietary iron intake    • Chronic pain of both knees    • Left leg swelling    • Mild intermittent asthma without complication    • Chronic fatigue

## 2022-07-11 ENCOUNTER — PREP FOR SURGERY (OUTPATIENT)
Dept: OTHER | Facility: HOSPITAL | Age: 52
End: 2022-07-11

## 2022-07-11 DIAGNOSIS — E66.01 OBESITY, CLASS III, BMI 40-49.9 (MORBID OBESITY): Primary | ICD-10-CM

## 2022-07-11 RX ORDER — SODIUM CHLORIDE 0.9 % (FLUSH) 0.9 %
3 SYRINGE (ML) INJECTION EVERY 12 HOURS SCHEDULED
Status: CANCELLED | OUTPATIENT
Start: 2022-07-11

## 2022-07-11 RX ORDER — CEFAZOLIN SODIUM IN 0.9 % NACL 3 G/100 ML
3 INTRAVENOUS SOLUTION, PIGGYBACK (ML) INTRAVENOUS
Status: CANCELLED | OUTPATIENT
Start: 2022-08-03

## 2022-07-11 RX ORDER — PROMETHAZINE HYDROCHLORIDE 12.5 MG/1
12.5 TABLET ORAL ONCE AS NEEDED
Status: CANCELLED | OUTPATIENT
Start: 2022-08-03

## 2022-07-11 RX ORDER — SCOLOPAMINE TRANSDERMAL SYSTEM 1 MG/1
1 PATCH, EXTENDED RELEASE TRANSDERMAL CONTINUOUS
Status: CANCELLED | OUTPATIENT
Start: 2022-08-03 | End: 2022-08-06

## 2022-07-11 RX ORDER — GABAPENTIN 250 MG/5ML
300 SOLUTION ORAL ONCE
Status: CANCELLED | OUTPATIENT
Start: 2022-08-03 | End: 2022-07-11

## 2022-07-11 RX ORDER — PANTOPRAZOLE SODIUM 40 MG/10ML
40 INJECTION, POWDER, LYOPHILIZED, FOR SOLUTION INTRAVENOUS ONCE
Status: CANCELLED | OUTPATIENT
Start: 2022-08-03 | End: 2022-07-11

## 2022-07-11 RX ORDER — METOCLOPRAMIDE HYDROCHLORIDE 5 MG/ML
10 INJECTION INTRAMUSCULAR; INTRAVENOUS ONCE
Status: CANCELLED | OUTPATIENT
Start: 2022-08-03 | End: 2022-07-11

## 2022-07-11 RX ORDER — SODIUM CHLORIDE, SODIUM LACTATE, POTASSIUM CHLORIDE, CALCIUM CHLORIDE 600; 310; 30; 20 MG/100ML; MG/100ML; MG/100ML; MG/100ML
100 INJECTION, SOLUTION INTRAVENOUS CONTINUOUS
Status: CANCELLED | OUTPATIENT
Start: 2022-08-03

## 2022-07-11 RX ORDER — CHLORHEXIDINE GLUCONATE 0.12 MG/ML
15 RINSE ORAL SEE ADMIN INSTRUCTIONS
Status: CANCELLED | OUTPATIENT
Start: 2022-08-03

## 2022-07-11 RX ORDER — SODIUM CHLORIDE 0.9 % (FLUSH) 0.9 %
3-10 SYRINGE (ML) INJECTION AS NEEDED
Status: CANCELLED | OUTPATIENT
Start: 2022-08-03

## 2022-07-11 RX ORDER — PROMETHAZINE HYDROCHLORIDE 25 MG/1
25 SUPPOSITORY RECTAL ONCE AS NEEDED
Status: CANCELLED | OUTPATIENT
Start: 2022-08-03

## 2022-07-19 ENCOUNTER — LAB (OUTPATIENT)
Dept: LAB | Facility: HOSPITAL | Age: 52
End: 2022-07-19

## 2022-07-19 DIAGNOSIS — E66.01 OBESITY, CLASS III, BMI 40-49.9 (MORBID OBESITY): ICD-10-CM

## 2022-07-19 LAB
ALBUMIN SERPL-MCNC: 4.2 G/DL (ref 3.5–5.2)
ALBUMIN/GLOB SERPL: 1.4 G/DL
ALP SERPL-CCNC: 93 U/L (ref 39–117)
ALT SERPL W P-5'-P-CCNC: 17 U/L (ref 1–33)
ANION GAP SERPL CALCULATED.3IONS-SCNC: 10.6 MMOL/L (ref 5–15)
AST SERPL-CCNC: 14 U/L (ref 1–32)
BILIRUB SERPL-MCNC: 0.6 MG/DL (ref 0–1.2)
BUN SERPL-MCNC: 13 MG/DL (ref 6–20)
BUN/CREAT SERPL: 14.4 (ref 7–25)
CALCIUM SPEC-SCNC: 9.1 MG/DL (ref 8.6–10.5)
CHLORIDE SERPL-SCNC: 99 MMOL/L (ref 98–107)
CO2 SERPL-SCNC: 26.4 MMOL/L (ref 22–29)
CREAT SERPL-MCNC: 0.9 MG/DL (ref 0.57–1)
DEPRECATED RDW RBC AUTO: 43.4 FL (ref 37–54)
EGFRCR SERPLBLD CKD-EPI 2021: 78.5 ML/MIN/1.73
ERYTHROCYTE [DISTWIDTH] IN BLOOD BY AUTOMATED COUNT: 14.7 % (ref 12.3–15.4)
GLOBULIN UR ELPH-MCNC: 3.1 GM/DL
GLUCOSE SERPL-MCNC: 83 MG/DL (ref 65–99)
HCT VFR BLD AUTO: 41.5 % (ref 34–46.6)
HGB BLD-MCNC: 13.3 G/DL (ref 12–15.9)
MCH RBC QN AUTO: 26.4 PG (ref 26.6–33)
MCHC RBC AUTO-ENTMCNC: 32 G/DL (ref 31.5–35.7)
MCV RBC AUTO: 82.5 FL (ref 79–97)
PLATELET # BLD AUTO: 184 10*3/MM3 (ref 140–450)
PMV BLD AUTO: 12.3 FL (ref 6–12)
POTASSIUM SERPL-SCNC: 3.8 MMOL/L (ref 3.5–5.2)
PROT SERPL-MCNC: 7.3 G/DL (ref 6–8.5)
RBC # BLD AUTO: 5.03 10*6/MM3 (ref 3.77–5.28)
SODIUM SERPL-SCNC: 136 MMOL/L (ref 136–145)
WBC NRBC COR # BLD: 6.35 10*3/MM3 (ref 3.4–10.8)

## 2022-07-19 PROCEDURE — 36415 COLL VENOUS BLD VENIPUNCTURE: CPT

## 2022-07-19 PROCEDURE — 85027 COMPLETE CBC AUTOMATED: CPT

## 2022-07-19 PROCEDURE — 80053 COMPREHEN METABOLIC PANEL: CPT

## 2022-07-21 ENCOUNTER — CONSULT (OUTPATIENT)
Dept: BARIATRICS/WEIGHT MGMT | Facility: CLINIC | Age: 52
End: 2022-07-21

## 2022-07-21 VITALS
DIASTOLIC BLOOD PRESSURE: 92 MMHG | TEMPERATURE: 97 F | RESPIRATION RATE: 18 BRPM | OXYGEN SATURATION: 99 % | HEIGHT: 68 IN | SYSTOLIC BLOOD PRESSURE: 151 MMHG | BODY MASS INDEX: 42.13 KG/M2 | WEIGHT: 278 LBS | HEART RATE: 76 BPM

## 2022-07-21 DIAGNOSIS — I10 PRIMARY HYPERTENSION: ICD-10-CM

## 2022-07-21 DIAGNOSIS — G89.29 CHRONIC PAIN OF BOTH KNEES: ICD-10-CM

## 2022-07-21 DIAGNOSIS — D50.8 IRON DEFICIENCY ANEMIA SECONDARY TO INADEQUATE DIETARY IRON INTAKE: ICD-10-CM

## 2022-07-21 DIAGNOSIS — J45.20 MILD INTERMITTENT ASTHMA WITHOUT COMPLICATION: ICD-10-CM

## 2022-07-21 DIAGNOSIS — M25.562 CHRONIC PAIN OF BOTH KNEES: ICD-10-CM

## 2022-07-21 DIAGNOSIS — M25.561 CHRONIC PAIN OF BOTH KNEES: ICD-10-CM

## 2022-07-21 DIAGNOSIS — R53.82 CHRONIC FATIGUE: ICD-10-CM

## 2022-07-21 DIAGNOSIS — Z71.3 DIETARY COUNSELING: ICD-10-CM

## 2022-07-21 DIAGNOSIS — R73.03 PREDIABETES: ICD-10-CM

## 2022-07-21 DIAGNOSIS — E66.01 OBESITY, CLASS III, BMI 40-49.9 (MORBID OBESITY): Primary | ICD-10-CM

## 2022-07-21 PROBLEM — Z01.818 PREOPERATIVE TESTING: Status: RESOLVED | Noted: 2022-02-16 | Resolved: 2022-07-21

## 2022-07-21 PROBLEM — R63.5 WEIGHT GAIN: Status: RESOLVED | Noted: 2022-02-16 | Resolved: 2022-07-21

## 2022-07-21 PROBLEM — Z01.818 PREOPERATIVE EVALUATION OF A MEDICAL CONDITION TO RULE OUT SURGICAL CONTRAINDICATIONS (TAR REQUIRED): Status: RESOLVED | Noted: 2022-02-16 | Resolved: 2022-07-21

## 2022-07-21 PROCEDURE — 99215 OFFICE O/P EST HI 40 MIN: CPT | Performed by: SURGERY

## 2022-07-21 NOTE — PATIENT INSTRUCTIONS
Bariatric Manual    You were provided a manual specific to the procedure that you have chosen.  Please refer to that with any questions or call the office at [phone]

## 2022-07-21 NOTE — H&P
Bariatric Consult:  Referred by Jun Aguillon MD    Jessica Mcdowell is here today for consult on Consult      History of Present Illness:     Jessica Mcdowell is a 51 y.o. female with morbid obesity with co-morbidities including diabetes, hypertension, knee pain and GERD who presents for surgical consultation for the above procedure. Jessica has completed the initial intake visit and has been examined by our nurse practitioner, dietician, psychologist and underwent the extensive educational teaching process under the guidance of our bariatric coordinator and myself. Jessica also has seen or if has not yet will see the educational video GANESH on the surgical procedure if available. Jessica attended today more educational teaching from our bariatric coordinator and myself. Jessica has had an extensive medical workup including a visit with their primary care physician, EKG, chest radiograph, blood work, EGD or UGI and possibly further testing. These have been reviewed by me and discussed with the patient. Jessica is now ready to proceed with surgery. Jessica presently denies nausea, vomiting, fever, chills, chest pain, shortness of air, melena, hematochezia, hemetemesis, dysuria, frequency, hematuria.       Past Medical History:   Diagnosis Date   • Abnormal uterine bleeding (AUB)    • Asthma    • Eczema    • History of anemia    • History of gout    • History of hemorrhoids    • Hypertension    • Leiomyoma    • Murmur, heart    • PONV (postoperative nausea and vomiting)    • Rheumatoid arthritis (HCC) 2/16/2022       Encounter Diagnoses   Name Primary?   • Obesity, Class III, BMI 40-49.9 (morbid obesity) (HCC) Yes   • Prediabetes    • Primary hypertension    • Chronic fatigue    • Mild intermittent asthma without complication    • Chronic pain of both knees    • Dietary counseling    • Iron deficiency anemia secondary to inadequate dietary iron intake        Past Surgical History:   Procedure Laterality Date   •  ABDOMINOPLASTY     •  SECTION      x2   • COLONOSCOPY     • HEMORRHOIDECTOMY     • HYSTERECTOMY SUPRACERVICAL Bilateral 1/15/2018    Procedure: LAPAROSCOPIC SUPRACERVICAL HYSTERECTOMY WITH DAVINCI ROBOT BILATERAL SALPINGECTOMY;  Surgeon: Brigitte Roldan MD;  Location: [facility];  Service:    • LIPOMA EXCISION      back   • WISDOM TOOTH EXTRACTION      THREE       Patient Active Problem List   Diagnosis   • Iron deficiency anemia secondary to inadequate dietary iron intake   • HTN (hypertension)   • Seasonal allergies   • Chronic fatigue   • Heart murmur   • Asthma   • IBS (irritable bowel syndrome)   • Hemorrhoids   • History of kidney stones   • Chronic pain of both knees   • Rheumatoid arthritis (HCC)   • Gout   • Prediabetes   • History of heart attack   • Obesity, Class III, BMI 40-49.9 (morbid obesity) (HCC)   • Localized edema   • Dietary counseling   • Left leg swelling       Allergies   Allergen Reactions   • Omxjybfgd-Fffefkfac-Ynabgykqq Shortness Of Breath     Reports has had this medication in   and  Had no reactions   • Penicillins Shortness Of Breath     Reports has had this medication in   and  Had no reactions   • Shellfish-Derived Products Anaphylaxis   • Iodine Itching and Nausea And Vomiting         Current Outpatient Medications:   •  albuterol (ACCUNEB) 1.25 MG/3ML nebulizer solution, Take 3 mL by nebulization Every 6 (Six) Hours As Needed for Wheezing., Disp: 60 each, Rfl: 0  •  albuterol sulfate  (90 Base) MCG/ACT inhaler, Inhale 2 puffs Every 6 (Six) Hours As Needed for Wheezing or Shortness of Air., Disp: 6.7 g, Rfl: 0  •  azelastine (ASTELIN) 0.1 % nasal spray, 2 sprays into each nostril Daily., Disp: , Rfl:   •  Ferrous Fumarate-Vitamin C ER (Harjinder-Sequels) 65-25 MG CR tablet, Take 50 mg by mouth 3 (Three) Times a Day., Disp: , Rfl:   •  fluticasone (FLONASE) 50 MCG/ACT nasal spray, 1 spray into each nostril As Needed., Disp: , Rfl:   •  indapamide (LOZOL) 2.5 MG  tablet, Take 1 tablet by mouth Every Morning., Disp: 30 tablet, Rfl: 6  •  lisinopril (PRINIVIL,ZESTRIL) 5 MG tablet, Take 1 tablet by mouth Every Morning., Disp: 30 tablet, Rfl: 6  •  loratadine (CLARITIN) 10 MG tablet, Take 10 mg by mouth Daily., Disp: , Rfl:   •  vitamin B-12 (CYANOCOBALAMIN) 100 MCG tablet, Take 50 mcg by mouth Daily. HOLD PRIOR TO SURGERY, Disp: , Rfl:   •  vitamin C (ASCORBIC ACID) 250 MG tablet, Take 250 mg by mouth 2 (Two) Times a Week., Disp: , Rfl:     Social History     Socioeconomic History   • Marital status: Single   Tobacco Use   • Smoking status: Never Smoker   • Smokeless tobacco: Never Used   Vaping Use   • Vaping Use: Never used   Substance and Sexual Activity   • Alcohol use: Yes     Comment: MONTHLY 1-2 DRINKS   • Drug use: No   • Sexual activity: Defer     Birth control/protection: Condom, Surgical       Family History   Adopted: Yes   Problem Relation Age of Onset   • Malig Hyperthermia Neg Hx        Review of Systems:  Review of Systems   Constitutional: Positive for fatigue.   Gastrointestinal: Positive for constipation.   Musculoskeletal: Positive for arthralgias and back pain.   All other systems reviewed and are negative.        Physical Exam:    Vital Signs:  Weight: 126 kg (278 lb)   Body mass index is 42.13 kg/m².  Temp: 97 °F (36.1 °C)   Heart Rate: 76   BP: 151/92       Physical Exam  Vitals reviewed.   HENT:      Head: Normocephalic and atraumatic.      Mouth/Throat:      Mouth: Mucous membranes are moist.      Pharynx: Oropharynx is clear.   Eyes:      General: No scleral icterus.     Extraocular Movements: Extraocular movements intact.      Conjunctiva/sclera: Conjunctivae normal.      Pupils: Pupils are equal, round, and reactive to light.   Neck:      Thyroid: No thyromegaly.   Cardiovascular:      Rate and Rhythm: Normal rate.   Pulmonary:      Effort: Pulmonary effort is normal. No respiratory distress.      Breath sounds: Normal breath sounds. No stridor. No  wheezing or rhonchi.   Abdominal:      General: Bowel sounds are normal.      Palpations: Abdomen is soft.      Tenderness: There is no abdominal tenderness. There is no right CVA tenderness, left CVA tenderness, guarding or rebound.      Hernia: No hernia is present.   Musculoskeletal:         General: Normal range of motion.      Cervical back: Normal range of motion and neck supple.   Lymphadenopathy:      Cervical: No cervical adenopathy.   Skin:     General: Skin is warm and dry.      Findings: No erythema.   Neurological:      Mental Status: She is alert and oriented to person, place, and time.   Psychiatric:         Mood and Affect: Mood normal.         Behavior: Behavior normal.         Thought Content: Thought content normal.         Judgment: Judgment normal.           Assessment:    Jessica Mcdowell is a 51 y.o. year old female with medically complicated severe obesity with a BMI of Body mass index is 42.13 kg/m². and multiple co-morbidities listed in the encounter diagnosis.    I think she is an appropriate candidate for this surgery, and is ready to proceed.      Plan/Discussion/Summary:  No hiatal hernia per upper GI but did show reflux.  Patient no PPI.    The patient has returned to the office for a surgical consultation and has requested to proceed with gastric sleeve.  I have had the opportunity to obtain a history, examine the patient and review the patient's chart. The procedure could be done laparoscopically and or robotically.    The patient understands that surgery is a tool and that weight loss is not guaranteed but only seen in the context of appropriate use, regular follow up, exercise and making appropriate food choices.     I personally discussed the potential complications of the laparoscopic gastric sleeve with this patient.  The patient is well aware of potential complications of the surgery that include but not limited to bleeding, infections, deep vein thrombosis, pulmonary  embolism, pulmonary complications such as pneumonia, cardiac event, hernias, small bowel obstruction, damage to the spleen or other organs, bowel injury, disfiguring scars, failure to lose weight, need for additional surgery, conversion to an open procedure and death.  The patient is also aware of complications which apply in particular to the gastric sleeve and can include but not limited to the leakage of gastric contents at the staple line, the development of an intra-abdominal abscess, gastroesophageal reflux disease, Ram's esophagus, ulcers, vitamin/mineral deficiencies, strictures, and the possibility of converting this procedure to a Ardhika-en-Y gastric bypass. The patient also understands the possibility of requiring an acid reducer medication for the rest of their life.    The risks, benefits, potential complications and alternative therapies were discussed at great length as outlined in our extensive consent forms, online consent and educational teaching processes.    The patient has confirmed the participation in the programs extensive educational activities.    All questions and concerns were answered to patient's satisfaction.  The patient now wishes to proceed with surgery.     The patient has agreed to a postoperative course of anitcoagulant therapy.      I instructed patient that the surgery could be laparoscopically and/or robotically.    I instructed patient to start on a H2 blocker or proton pump inhibitor if not already on one of these medications.    I explained in detail the procedures that are in the consent.  All of these procedures have a chance to convert to open if any technical challenges or complications do occur.  Bariatric surgery is not cosmetic surgery but rather a tool to help a patient make a life-long commitment lifestyle change including diet, exercise, behavior changes, and taking supplemental vitamins and minerals.    Problems after surgery may require more operations to  correct them.    The risks, benefits, alternatives, and potential complications of all of the procedures were explained in detail including, but not limited to death, anesthesia and medication adverse effect, deep venous thrombosis, pulmonary embolism, trocar site/incisional hernia, wound infection, abdominal infection, bleeding, failure to lose weight, gain weight, a change in body image, metabolic complications with vitamin deficiences and anemia.    Weight loss expectations were discussed with the patient in detail. The weight loss operations most commonly performed are the sleeve gastrectomy and the Radhika-en-Y gastric bypass. These operations result in weight losses up to approximately 25-35% of initial body weight 12 to 24 months after surgery with the gastric bypass usually the higher percent of weight loss but depends on patient using the tool.    For the gastric bypass and loop duodenal switch (BRIAN-S) the risks include but not limited to the following early complications:  Anastomotic leak/peritonitis, Radhika/Alimentary/biliopancreatic limb obstruction, severe & minor wound infection/seroma, and nausea/vomiting.  Late complications can include but are not limited to malnutrition, vitamin deficiencies, frequent loose stools,  stomal stenosis, marginal ulcer, bowel obstruction, intussusception, internal, and incisional hernia.    Regarding the gastric sleeve, there is higher risk of dysphagia and reflux leading to possible Ram's esophagus compared to a gastric bypass, as well as risk of internal visceral/organ injury, splenectomy, bleeding, infection, leak (which could require further intervention possible conversion to Radhika-en-Y gastric bypass), stenosis and possibility of regaining weight.    Jessica was counseled regarding diagnostic results, instructions for management, risk factor reductions, prognosis, patient and family education, impressions, risks and benefits of treatment options and importance of  compliance with treatment. Total time of the encounter was over 45 minutes counseling the patient regarding the procedure as above and reviewing as well as ordering labs, medications and the procedure.  The chart was also reviewed prior to seeing the patient reviewing previous testing, studies and labs.    Jessica understands the surgical procedures and the different surgical options that are available.  She understands the lifestyle changes that are required after surgery and has agreed to follow the guidelines outlined in the weight management program.  She also expressed understanding of the risks involved and had all of female questions answered and desires to proceed.      Randal Calix MD  7/21/2022

## 2022-08-01 ENCOUNTER — TELEPHONE (OUTPATIENT)
Dept: BARIATRICS/WEIGHT MGMT | Facility: CLINIC | Age: 52
End: 2022-08-01

## 2022-08-01 ENCOUNTER — PRE-ADMISSION TESTING (OUTPATIENT)
Dept: PREADMISSION TESTING | Facility: HOSPITAL | Age: 52
End: 2022-08-01

## 2022-08-01 VITALS
BODY MASS INDEX: 42.27 KG/M2 | TEMPERATURE: 97.6 F | SYSTOLIC BLOOD PRESSURE: 156 MMHG | HEIGHT: 68 IN | RESPIRATION RATE: 20 BRPM | DIASTOLIC BLOOD PRESSURE: 108 MMHG | OXYGEN SATURATION: 99 % | HEART RATE: 69 BPM

## 2022-08-01 DIAGNOSIS — E66.01 OBESITY, CLASS III, BMI 40-49.9 (MORBID OBESITY): ICD-10-CM

## 2022-08-01 LAB — SARS-COV-2 ORF1AB RESP QL NAA+PROBE: NOT DETECTED

## 2022-08-01 PROCEDURE — U0004 COV-19 TEST NON-CDC HGH THRU: HCPCS

## 2022-08-01 PROCEDURE — C9803 HOPD COVID-19 SPEC COLLECT: HCPCS

## 2022-08-01 RX ORDER — CHLORHEXIDINE GLUCONATE 500 MG/1
1 CLOTH TOPICAL
COMMUNITY
End: 2022-08-05 | Stop reason: HOSPADM

## 2022-08-01 RX ORDER — MULTIPLE VITAMINS W/ MINERALS TAB 9MG-400MCG
1 TAB ORAL DAILY
COMMUNITY

## 2022-08-01 NOTE — DISCHARGE INSTRUCTIONS
Take only the following medications the morning of surgery:     May use inhalers & nasal spray if needed      Do not take Bariatric Vitamins, Folic Acid, Actigall (if applicable) or Lovenox Injections (if applicable) the morning of surgery.  If you have a history of blood clots or have a BMI greater than 50, Dr. Calix may order Lovenox for after surgery. Do not take Lovenox blood thinner before surgery.      General Instructions:   Drink one 20 ounce Gatorade G2 the evening before surgery.  Nothing red in color.  Do not eat solid food after midnight the night before surgery.    The morning of surgery have another 20 ounce Gatorade G2.  Again, nothing red in color.  Your drink must be completed 2 hours before your arrival time.   Patients who avoid smoking, chewing tobacco and alcohol for 4 weeks prior to surgery have a reduced risk of post-operative complications.  Quit smoking as many days before surgery as you can.  Do not smoke, use chewing tobacco or drink alcohol the day of surgery.   Bring any papers given to you in the doctor's office.  Wear clean comfortable clothes.  Do not wear contact lenses, false eyelashes or make-up.  Bring a case for your glasses.   Bring crutches or walker if applicable.  Remove all piercings.  Leave jewelry and any other valuables at home.  Remove fingernail polish, gel overlays or any artificial nails.  Hair extensions with metal clips must be removed prior to surgery.  The Pre-Admission Testing nurse will instruct you to bring medications if unable to obtain an accurate list in Pre-Admission Testing.    If you were given a blood bank ID arm band remember to bring it with you the day of surgery.    Preventing a Surgical Site Infection:  For 2 to 3 days before surgery, avoid shaving with a razor because the razor can irritate skin and make it easier to develop an infection.    Any areas of open skin can increase the risk of a post-operative wound infection by allowing bacteria to  enter and travel throughout the body.  Notify your surgeon if you have any skin wounds / rashes even if it is not near the expected surgical site.  The area will need assessed to determine if surgery should be delayed until it is healed.  2 days prior to surgery, take a shower using a fresh bar of anti-bacterial soap (such as Dial).  Use a clean washcloth and dry with a clean towel.    The day prior to surgery, take a shower using a fresh bar of anti-bacterial soap (such as Dial).  Use a clean washcloth and dry with a clean towel.  Sleep in a clean bed with clean clothing.  Do not allow pets to sleep with you.  The morning of surgery shower using a fresh bar of anti-bacterial soap (such as Dial).  Use a clean washcloth and dry with a clean towel.  Follow the Chlorhexidine instructions below.    CHLORHEXIDINE CLOTH INSTRUCTIONS  The morning of surgery follow these instructions using the Chlorhexidine cloths you've been given.  These steps reduce bacteria on the body.  Do not use the cloths near your eyes, ears mouth, genitalia or on open wounds.  Throw the cloths away after use but do not try to flush them down a toilet.    Open and remove one cloth at a time from the package.    Leave the cloth unfolded and begin the bathing.  Massage the skin with the cloths using gentle pressure to remove bacteria.  Do not scrub harshly.   Follow the steps below with one 2% CHG cloth per area (6 total cloths).  One cloth for neck, shoulders and chest.  One cloth for both arms, hands, fingers and underarms (do underarms last).  One cloth for the abdomen followed by groin.  One cloth for right leg and foot including between the toes.  One cloth for left leg and foot including between the toes.  The last cloth is to be used for the back of the neck, back and buttocks.    Allow the CHG to air dry 3 minutes on the skin which will give it time to work and decrease the chance of irritation.  The skin may feel sticky until it is dry.  Do  not rinse with water or any other liquid or you will lose the beneficial effects of the CHG.  If mild skin irritation occurs, do rinse the skin to remove the CHG.  Report this to the nurse at time of admission.  Do not apply lotions, creams, ointments, deodorants or perfumes after using the clothes. Dress in clean clothes before coming to the hospital.    Ask your surgeon if you will be receiving antibiotics prior to surgery.  Make sure you, your family, and all healthcare providers clean their hands with soap and water or an alcohol based hand  before caring for you or your wound.      Day of surgery:8/3/2022   0900  Your arrival time is approximately two hours before your scheduled surgery time.  Upon arrival, a Pre-op nurse and Anesthesiologist will review your health history, obtain vital signs, and answer questions you may have.  A Pre-op nurse will start an IV and you may receive medication in preparation for surgery, including something to help you relax.  If applicable, we do ask that you have your C-PAP/BI-PAP machine available. It can be utilized the night of surgery.     Please be aware that surgery does come with discomfort.  We want to make every effort to control your discomfort so please discuss any uncontrolled symptoms with your nurse.   Your doctor will most likely have prescribed pain medications.      If you are going home after surgery you will receive individualized written care instructions before being discharged.  A responsible adult must drive you to and from the hospital on the day of your surgery and stay with you for 24 hours.  Discharge prescriptions can be filled by the hospital pharmacy during regular pharmacy hours.  If you are having surgery late in the day/evening your prescription may be e-prescribed to your pharmacy.  Please verify your pharmacy hours or chose a 24 hour pharmacy to avoid not having access to your prescription because your pharmacy has closed for the  day.    If you are staying overnight following surgery, you will be transported to your hospital room following the recovery period.  [facility] has all private rooms.    If you have any questions please call Pre-Admission Testing at [phone].  Deductibles and co-payments are collected on the day of service. Please be prepared to pay the required co-pay, deductible or deposit on the day of service as defined by your plan.    Patient Education for Self-Quarantine Process    Following your COVID testing, we strongly recommend that you wear a mask when you are with other people and practice social distancing.   Limit your activities to only required outings.  Wash your hands with soap and water frequently for at least 20 seconds.   Avoid touching your eyes, nose and mouth with unwashed hands.  Do not share anything - utensils, drinking glasses, food from the same bowl.   Sanitize household surfaces daily. Include all high touch areas (door handles, light switches, phones, countertops, etc.)    Call your surgeon immediately if you experience any of the following symptoms:  Sore Throat  Shortness of Breath or difficulty breathing  Cough  Chills  Body soreness or muscle pain  Headache  Fever  New loss of taste or smell  Do not arrive for your surgery ill.  Your procedure will need to be rescheduled to another time.  You will need to call your physician before the day of surgery to avoid any unnecessary exposure to hospital staff as well as other patients.

## 2022-08-02 ENCOUNTER — ANESTHESIA EVENT (OUTPATIENT)
Dept: PERIOP | Facility: HOSPITAL | Age: 52
End: 2022-08-02

## 2022-08-02 RX ORDER — ACETAMINOPHEN 10 MG/ML
1000 INJECTION, SOLUTION INTRAVENOUS ONCE
Status: CANCELLED | OUTPATIENT
Start: 2022-08-03

## 2022-08-03 ENCOUNTER — ANESTHESIA (OUTPATIENT)
Dept: PERIOP | Facility: HOSPITAL | Age: 52
End: 2022-08-03

## 2022-08-03 ENCOUNTER — HOSPITAL ENCOUNTER (INPATIENT)
Facility: HOSPITAL | Age: 52
LOS: 2 days | Discharge: HOME | End: 2022-08-05
Attending: SURGERY | Admitting: SURGERY

## 2022-08-03 DIAGNOSIS — R73.03 PREDIABETES: ICD-10-CM

## 2022-08-03 DIAGNOSIS — Z87.442 HISTORY OF KIDNEY STONES: ICD-10-CM

## 2022-08-03 DIAGNOSIS — Z98.84 S/P LAPAROSCOPIC SLEEVE GASTRECTOMY: Primary | ICD-10-CM

## 2022-08-03 DIAGNOSIS — M79.89 LEFT LEG SWELLING: ICD-10-CM

## 2022-08-03 DIAGNOSIS — M06.9 RHEUMATOID ARTHRITIS OF OTHER SITE, UNSPECIFIED WHETHER RHEUMATOID FACTOR PRESENT: ICD-10-CM

## 2022-08-03 DIAGNOSIS — I25.2 HISTORY OF HEART ATTACK: ICD-10-CM

## 2022-08-03 DIAGNOSIS — M25.561 CHRONIC PAIN OF BOTH KNEES: ICD-10-CM

## 2022-08-03 DIAGNOSIS — J30.2 SEASONAL ALLERGIES: ICD-10-CM

## 2022-08-03 DIAGNOSIS — I10 PRIMARY HYPERTENSION: ICD-10-CM

## 2022-08-03 DIAGNOSIS — E66.01 OBESITY, CLASS III, BMI 40-49.9 (MORBID OBESITY): ICD-10-CM

## 2022-08-03 DIAGNOSIS — K44.9 HIATAL HERNIA: ICD-10-CM

## 2022-08-03 DIAGNOSIS — Z71.3 DIETARY COUNSELING: ICD-10-CM

## 2022-08-03 DIAGNOSIS — M25.562 CHRONIC PAIN OF BOTH KNEES: ICD-10-CM

## 2022-08-03 DIAGNOSIS — D50.8 IRON DEFICIENCY ANEMIA SECONDARY TO INADEQUATE DIETARY IRON INTAKE: ICD-10-CM

## 2022-08-03 DIAGNOSIS — J45.20 MILD INTERMITTENT ASTHMA WITHOUT COMPLICATION: ICD-10-CM

## 2022-08-03 DIAGNOSIS — R01.1 HEART MURMUR: ICD-10-CM

## 2022-08-03 DIAGNOSIS — R53.82 CHRONIC FATIGUE: ICD-10-CM

## 2022-08-03 DIAGNOSIS — R60.0 LOCALIZED EDEMA: ICD-10-CM

## 2022-08-03 DIAGNOSIS — G89.29 CHRONIC PAIN OF BOTH KNEES: ICD-10-CM

## 2022-08-03 PROCEDURE — 25010000002 ONDANSETRON PER 1 MG: Performed by: SURGERY

## 2022-08-03 PROCEDURE — 25010000002 ONDANSETRON PER 1 MG: Performed by: STUDENT IN AN ORGANIZED HEALTH CARE EDUCATION/TRAINING PROGRAM

## 2022-08-03 PROCEDURE — 25010000002 CEFAZOLIN PER 500 MG: Performed by: SURGERY

## 2022-08-03 PROCEDURE — 25010000002 MAGNESIUM SULFATE PER 500 MG OF MAGNESIUM: Performed by: STUDENT IN AN ORGANIZED HEALTH CARE EDUCATION/TRAINING PROGRAM

## 2022-08-03 PROCEDURE — 43775 LAP SLEEVE GASTRECTOMY: CPT | Performed by: NURSE PRACTITIONER

## 2022-08-03 PROCEDURE — 0BQT4ZZ REPAIR DIAPHRAGM, PERCUTANEOUS ENDOSCOPIC APPROACH: ICD-10-PCS | Performed by: SURGERY

## 2022-08-03 PROCEDURE — 8E0W4CZ ROBOTIC ASSISTED PROCEDURE OF TRUNK REGION, PERCUTANEOUS ENDOSCOPIC APPROACH: ICD-10-PCS | Performed by: SURGERY

## 2022-08-03 PROCEDURE — 25010000002 FENTANYL CITRATE (PF) 50 MCG/ML SOLUTION: Performed by: STUDENT IN AN ORGANIZED HEALTH CARE EDUCATION/TRAINING PROGRAM

## 2022-08-03 PROCEDURE — 25010000002 CLONIDINE PER 1 MG: Performed by: SURGERY

## 2022-08-03 PROCEDURE — 25010000002 EPINEPHRINE 1 MG/ML SOLUTION 30 ML VIAL: Performed by: SURGERY

## 2022-08-03 PROCEDURE — 88307 TISSUE EXAM BY PATHOLOGIST: CPT | Performed by: SURGERY

## 2022-08-03 PROCEDURE — 25010000002 DEXAMETHASONE PER 1 MG: Performed by: STUDENT IN AN ORGANIZED HEALTH CARE EDUCATION/TRAINING PROGRAM

## 2022-08-03 PROCEDURE — 43775 LAP SLEEVE GASTRECTOMY: CPT | Performed by: SURGERY

## 2022-08-03 PROCEDURE — 25010000002 HYDROMORPHONE PER 4 MG: Performed by: STUDENT IN AN ORGANIZED HEALTH CARE EDUCATION/TRAINING PROGRAM

## 2022-08-03 PROCEDURE — 25010000002 ROPIVACAINE PER 1 MG: Performed by: SURGERY

## 2022-08-03 PROCEDURE — 0DB64Z3 EXCISION OF STOMACH, PERCUTANEOUS ENDOSCOPIC APPROACH, VERTICAL: ICD-10-PCS | Performed by: SURGERY

## 2022-08-03 PROCEDURE — 25010000002 KETOROLAC TROMETHAMINE PER 15 MG: Performed by: SURGERY

## 2022-08-03 PROCEDURE — 25010000002 METOCLOPRAMIDE PER 10 MG: Performed by: SURGERY

## 2022-08-03 PROCEDURE — 25010000002 PROPOFOL 10 MG/ML EMULSION: Performed by: STUDENT IN AN ORGANIZED HEALTH CARE EDUCATION/TRAINING PROGRAM

## 2022-08-03 DEVICE — SEALANT WND FIBRIN TISSEEL PREFIL/SYR/PRIMAFZ 4ML: Type: IMPLANTABLE DEVICE | Site: ABDOMEN | Status: FUNCTIONAL

## 2022-08-03 DEVICE — IMPLANTABLE DEVICE
Type: IMPLANTABLE DEVICE | Site: ABDOMEN | Status: FUNCTIONAL
Brand: TITAN SGS STANDARD GASTRIC STAPLER

## 2022-08-03 RX ORDER — HYDROMORPHONE HYDROCHLORIDE 1 MG/ML
0.5 INJECTION, SOLUTION INTRAMUSCULAR; INTRAVENOUS; SUBCUTANEOUS
Status: DISCONTINUED | OUTPATIENT
Start: 2022-08-03 | End: 2022-08-05 | Stop reason: HOSPADM

## 2022-08-03 RX ORDER — ONDANSETRON 2 MG/ML
INJECTION INTRAMUSCULAR; INTRAVENOUS AS NEEDED
Status: DISCONTINUED | OUTPATIENT
Start: 2022-08-03 | End: 2022-08-03 | Stop reason: SURG

## 2022-08-03 RX ORDER — CHLORHEXIDINE GLUCONATE 0.12 MG/ML
15 RINSE ORAL SEE ADMIN INSTRUCTIONS
Status: COMPLETED | OUTPATIENT
Start: 2022-08-03 | End: 2022-08-03

## 2022-08-03 RX ORDER — PROMETHAZINE HYDROCHLORIDE 12.5 MG/1
12.5 TABLET ORAL EVERY 4 HOURS PRN
Status: DISCONTINUED | OUTPATIENT
Start: 2022-08-03 | End: 2022-08-05 | Stop reason: HOSPADM

## 2022-08-03 RX ORDER — LISINOPRIL 5 MG/1
5 TABLET ORAL EVERY MORNING
Status: DISCONTINUED | OUTPATIENT
Start: 2022-08-04 | End: 2022-08-05 | Stop reason: HOSPADM

## 2022-08-03 RX ORDER — HYDRALAZINE HYDROCHLORIDE 20 MG/ML
5 INJECTION INTRAMUSCULAR; INTRAVENOUS
Status: DISCONTINUED | OUTPATIENT
Start: 2022-08-03 | End: 2022-08-03 | Stop reason: HOSPADM

## 2022-08-03 RX ORDER — MAGNESIUM HYDROXIDE 1200 MG/15ML
LIQUID ORAL AS NEEDED
Status: DISCONTINUED | OUTPATIENT
Start: 2022-08-03 | End: 2022-08-03 | Stop reason: HOSPADM

## 2022-08-03 RX ORDER — ONDANSETRON 4 MG/1
4 TABLET, ORALLY DISINTEGRATING ORAL EVERY 4 HOURS PRN
Status: DISCONTINUED | OUTPATIENT
Start: 2022-08-03 | End: 2022-08-05 | Stop reason: HOSPADM

## 2022-08-03 RX ORDER — DIPHENHYDRAMINE HYDROCHLORIDE 50 MG/ML
12.5 INJECTION INTRAMUSCULAR; INTRAVENOUS
Status: DISCONTINUED | OUTPATIENT
Start: 2022-08-03 | End: 2022-08-03 | Stop reason: HOSPADM

## 2022-08-03 RX ORDER — SODIUM CHLORIDE, SODIUM LACTATE, POTASSIUM CHLORIDE, CALCIUM CHLORIDE 600; 310; 30; 20 MG/100ML; MG/100ML; MG/100ML; MG/100ML
150 INJECTION, SOLUTION INTRAVENOUS CONTINUOUS
Status: DISCONTINUED | OUTPATIENT
Start: 2022-08-03 | End: 2022-08-05 | Stop reason: HOSPADM

## 2022-08-03 RX ORDER — MAGNESIUM SULFATE HEPTAHYDRATE 500 MG/ML
INJECTION, SOLUTION INTRAMUSCULAR; INTRAVENOUS AS NEEDED
Status: DISCONTINUED | OUTPATIENT
Start: 2022-08-03 | End: 2022-08-03 | Stop reason: SURG

## 2022-08-03 RX ORDER — PROMETHAZINE HYDROCHLORIDE 25 MG/1
25 SUPPOSITORY RECTAL ONCE AS NEEDED
Status: DISCONTINUED | OUTPATIENT
Start: 2022-08-03 | End: 2022-08-03 | Stop reason: HOSPADM

## 2022-08-03 RX ORDER — LIDOCAINE HYDROCHLORIDE 10 MG/ML
0.5 INJECTION, SOLUTION EPIDURAL; INFILTRATION; INTRACAUDAL; PERINEURAL ONCE AS NEEDED
Status: DISCONTINUED | OUTPATIENT
Start: 2022-08-03 | End: 2022-08-03 | Stop reason: HOSPADM

## 2022-08-03 RX ORDER — FAMOTIDINE 10 MG/ML
20 INJECTION, SOLUTION INTRAVENOUS EVERY 12 HOURS SCHEDULED
Status: DISCONTINUED | OUTPATIENT
Start: 2022-08-03 | End: 2022-08-05 | Stop reason: HOSPADM

## 2022-08-03 RX ORDER — ONDANSETRON 2 MG/ML
4 INJECTION INTRAMUSCULAR; INTRAVENOUS EVERY 4 HOURS PRN
Status: DISCONTINUED | OUTPATIENT
Start: 2022-08-03 | End: 2022-08-05 | Stop reason: HOSPADM

## 2022-08-03 RX ORDER — ONDANSETRON 2 MG/ML
4 INJECTION INTRAMUSCULAR; INTRAVENOUS ONCE AS NEEDED
Status: COMPLETED | OUTPATIENT
Start: 2022-08-03 | End: 2022-08-03

## 2022-08-03 RX ORDER — SODIUM CHLORIDE, SODIUM LACTATE, POTASSIUM CHLORIDE, CALCIUM CHLORIDE 600; 310; 30; 20 MG/100ML; MG/100ML; MG/100ML; MG/100ML
100 INJECTION, SOLUTION INTRAVENOUS CONTINUOUS
Status: DISCONTINUED | OUTPATIENT
Start: 2022-08-03 | End: 2022-08-03

## 2022-08-03 RX ORDER — ACETAMINOPHEN 500 MG
1000 TABLET ORAL EVERY 6 HOURS
Status: DISCONTINUED | OUTPATIENT
Start: 2022-08-03 | End: 2022-08-05 | Stop reason: HOSPADM

## 2022-08-03 RX ORDER — GABAPENTIN 250 MG/5ML
300 SOLUTION ORAL EVERY 8 HOURS
Status: DISCONTINUED | OUTPATIENT
Start: 2022-08-03 | End: 2022-08-05 | Stop reason: HOSPADM

## 2022-08-03 RX ORDER — HYDRALAZINE HYDROCHLORIDE 20 MG/ML
10 INJECTION INTRAMUSCULAR; INTRAVENOUS
Status: DISCONTINUED | OUTPATIENT
Start: 2022-08-03 | End: 2022-08-05 | Stop reason: HOSPADM

## 2022-08-03 RX ORDER — ACETAMINOPHEN 10 MG/ML
INJECTION, SOLUTION INTRAVENOUS AS NEEDED
Status: DISCONTINUED | OUTPATIENT
Start: 2022-08-03 | End: 2022-08-03 | Stop reason: SURG

## 2022-08-03 RX ORDER — SODIUM CHLORIDE 0.9 % (FLUSH) 0.9 %
3 SYRINGE (ML) INJECTION EVERY 12 HOURS SCHEDULED
Status: DISCONTINUED | OUTPATIENT
Start: 2022-08-03 | End: 2022-08-03 | Stop reason: HOSPADM

## 2022-08-03 RX ORDER — GABAPENTIN 250 MG/5ML
300 SOLUTION ORAL ONCE
Status: COMPLETED | OUTPATIENT
Start: 2022-08-03 | End: 2022-08-03

## 2022-08-03 RX ORDER — FLUMAZENIL 0.1 MG/ML
0.2 INJECTION INTRAVENOUS AS NEEDED
Status: DISCONTINUED | OUTPATIENT
Start: 2022-08-03 | End: 2022-08-03 | Stop reason: HOSPADM

## 2022-08-03 RX ORDER — HYDROMORPHONE HYDROCHLORIDE 1 MG/ML
0.5 INJECTION, SOLUTION INTRAMUSCULAR; INTRAVENOUS; SUBCUTANEOUS
Status: DISCONTINUED | OUTPATIENT
Start: 2022-08-03 | End: 2022-08-03 | Stop reason: HOSPADM

## 2022-08-03 RX ORDER — PROPOFOL 10 MG/ML
VIAL (ML) INTRAVENOUS AS NEEDED
Status: DISCONTINUED | OUTPATIENT
Start: 2022-08-03 | End: 2022-08-03 | Stop reason: SURG

## 2022-08-03 RX ORDER — FENTANYL CITRATE 50 UG/ML
INJECTION, SOLUTION INTRAMUSCULAR; INTRAVENOUS AS NEEDED
Status: DISCONTINUED | OUTPATIENT
Start: 2022-08-03 | End: 2022-08-03 | Stop reason: SURG

## 2022-08-03 RX ORDER — ENOXAPARIN SODIUM 100 MG/ML
40 INJECTION SUBCUTANEOUS ONCE
Status: COMPLETED | OUTPATIENT
Start: 2022-08-04 | End: 2022-08-04

## 2022-08-03 RX ORDER — HYDROCODONE BITARTRATE AND ACETAMINOPHEN 7.5; 325 MG/1; MG/1
1 TABLET ORAL ONCE AS NEEDED
Status: DISCONTINUED | OUTPATIENT
Start: 2022-08-03 | End: 2022-08-03 | Stop reason: HOSPADM

## 2022-08-03 RX ORDER — MIRTAZAPINE 15 MG/1
15 TABLET, ORALLY DISINTEGRATING ORAL NIGHTLY
Status: DISCONTINUED | OUTPATIENT
Start: 2022-08-03 | End: 2022-08-05 | Stop reason: HOSPADM

## 2022-08-03 RX ORDER — NALOXONE HCL 0.4 MG/ML
0.1 VIAL (ML) INJECTION
Status: DISCONTINUED | OUTPATIENT
Start: 2022-08-03 | End: 2022-08-05 | Stop reason: HOSPADM

## 2022-08-03 RX ORDER — PROMETHAZINE HYDROCHLORIDE 25 MG/1
12.5 TABLET ORAL ONCE AS NEEDED
Status: DISCONTINUED | OUTPATIENT
Start: 2022-08-03 | End: 2022-08-03 | Stop reason: HOSPADM

## 2022-08-03 RX ORDER — OXYCODONE AND ACETAMINOPHEN 7.5; 325 MG/1; MG/1
1 TABLET ORAL EVERY 4 HOURS PRN
Status: DISCONTINUED | OUTPATIENT
Start: 2022-08-03 | End: 2022-08-03 | Stop reason: HOSPADM

## 2022-08-03 RX ORDER — SODIUM CHLORIDE, SODIUM LACTATE, POTASSIUM CHLORIDE, CALCIUM CHLORIDE 600; 310; 30; 20 MG/100ML; MG/100ML; MG/100ML; MG/100ML
9 INJECTION, SOLUTION INTRAVENOUS CONTINUOUS
Status: DISCONTINUED | OUTPATIENT
Start: 2022-08-03 | End: 2022-08-03

## 2022-08-03 RX ORDER — ROCURONIUM BROMIDE 10 MG/ML
INJECTION, SOLUTION INTRAVENOUS AS NEEDED
Status: DISCONTINUED | OUTPATIENT
Start: 2022-08-03 | End: 2022-08-03 | Stop reason: SURG

## 2022-08-03 RX ORDER — HYDROMORPHONE HYDROCHLORIDE 2 MG/1
2 TABLET ORAL EVERY 4 HOURS PRN
Status: DISCONTINUED | OUTPATIENT
Start: 2022-08-03 | End: 2022-08-05 | Stop reason: HOSPADM

## 2022-08-03 RX ORDER — KETAMINE HYDROCHLORIDE 10 MG/ML
INJECTION INTRAMUSCULAR; INTRAVENOUS AS NEEDED
Status: DISCONTINUED | OUTPATIENT
Start: 2022-08-03 | End: 2022-08-03 | Stop reason: SURG

## 2022-08-03 RX ORDER — LORAZEPAM 1 MG/1
1 TABLET ORAL EVERY 12 HOURS PRN
Status: DISCONTINUED | OUTPATIENT
Start: 2022-08-03 | End: 2022-08-05 | Stop reason: HOSPADM

## 2022-08-03 RX ORDER — FENTANYL CITRATE 50 UG/ML
50 INJECTION, SOLUTION INTRAMUSCULAR; INTRAVENOUS
Status: DISCONTINUED | OUTPATIENT
Start: 2022-08-03 | End: 2022-08-03 | Stop reason: HOSPADM

## 2022-08-03 RX ORDER — SODIUM CHLORIDE 0.9 % (FLUSH) 0.9 %
3-10 SYRINGE (ML) INJECTION AS NEEDED
Status: DISCONTINUED | OUTPATIENT
Start: 2022-08-03 | End: 2022-08-03 | Stop reason: HOSPADM

## 2022-08-03 RX ORDER — SCOLOPAMINE TRANSDERMAL SYSTEM 1 MG/1
1 PATCH, EXTENDED RELEASE TRANSDERMAL CONTINUOUS
Status: DISCONTINUED | OUTPATIENT
Start: 2022-08-03 | End: 2022-08-05 | Stop reason: HOSPADM

## 2022-08-03 RX ORDER — GABAPENTIN 300 MG/1
300 CAPSULE ORAL EVERY 8 HOURS
Status: DISCONTINUED | OUTPATIENT
Start: 2022-08-03 | End: 2022-08-05 | Stop reason: HOSPADM

## 2022-08-03 RX ORDER — LABETALOL HYDROCHLORIDE 5 MG/ML
5 INJECTION, SOLUTION INTRAVENOUS
Status: DISCONTINUED | OUTPATIENT
Start: 2022-08-03 | End: 2022-08-03 | Stop reason: HOSPADM

## 2022-08-03 RX ORDER — DIPHENHYDRAMINE HYDROCHLORIDE 50 MG/ML
25 INJECTION INTRAMUSCULAR; INTRAVENOUS EVERY 4 HOURS PRN
Status: DISCONTINUED | OUTPATIENT
Start: 2022-08-03 | End: 2022-08-05 | Stop reason: HOSPADM

## 2022-08-03 RX ORDER — CYANOCOBALAMIN 1000 UG/ML
1000 INJECTION, SOLUTION INTRAMUSCULAR; SUBCUTANEOUS ONCE
Status: COMPLETED | OUTPATIENT
Start: 2022-08-04 | End: 2022-08-04

## 2022-08-03 RX ORDER — PROMETHAZINE HYDROCHLORIDE 12.5 MG/1
12.5 SUPPOSITORY RECTAL EVERY 4 HOURS PRN
Status: DISCONTINUED | OUTPATIENT
Start: 2022-08-03 | End: 2022-08-05 | Stop reason: HOSPADM

## 2022-08-03 RX ORDER — METOCLOPRAMIDE HYDROCHLORIDE 5 MG/ML
10 INJECTION INTRAMUSCULAR; INTRAVENOUS EVERY 6 HOURS
Status: DISCONTINUED | OUTPATIENT
Start: 2022-08-03 | End: 2022-08-05 | Stop reason: HOSPADM

## 2022-08-03 RX ORDER — EPHEDRINE SULFATE 50 MG/ML
5 INJECTION, SOLUTION INTRAVENOUS ONCE AS NEEDED
Status: DISCONTINUED | OUTPATIENT
Start: 2022-08-03 | End: 2022-08-03 | Stop reason: HOSPADM

## 2022-08-03 RX ORDER — PROCHLORPERAZINE EDISYLATE 5 MG/ML
10 INJECTION INTRAMUSCULAR; INTRAVENOUS EVERY 6 HOURS PRN
Status: DISCONTINUED | OUTPATIENT
Start: 2022-08-03 | End: 2022-08-05 | Stop reason: HOSPADM

## 2022-08-03 RX ORDER — PROMETHAZINE HYDROCHLORIDE 25 MG/1
25 TABLET ORAL ONCE AS NEEDED
Status: DISCONTINUED | OUTPATIENT
Start: 2022-08-03 | End: 2022-08-03 | Stop reason: HOSPADM

## 2022-08-03 RX ORDER — PANTOPRAZOLE SODIUM 40 MG/10ML
40 INJECTION, POWDER, LYOPHILIZED, FOR SOLUTION INTRAVENOUS ONCE
Status: COMPLETED | OUTPATIENT
Start: 2022-08-03 | End: 2022-08-03

## 2022-08-03 RX ORDER — NALOXONE HCL 0.4 MG/ML
0.2 VIAL (ML) INJECTION AS NEEDED
Status: DISCONTINUED | OUTPATIENT
Start: 2022-08-03 | End: 2022-08-03 | Stop reason: HOSPADM

## 2022-08-03 RX ORDER — METOCLOPRAMIDE HYDROCHLORIDE 5 MG/ML
10 INJECTION INTRAMUSCULAR; INTRAVENOUS ONCE
Status: COMPLETED | OUTPATIENT
Start: 2022-08-03 | End: 2022-08-03

## 2022-08-03 RX ORDER — MIDAZOLAM HYDROCHLORIDE 1 MG/ML
1 INJECTION INTRAMUSCULAR; INTRAVENOUS
Status: DISCONTINUED | OUTPATIENT
Start: 2022-08-03 | End: 2022-08-03 | Stop reason: HOSPADM

## 2022-08-03 RX ORDER — DIPHENHYDRAMINE HCL 25 MG
25 CAPSULE ORAL
Status: DISCONTINUED | OUTPATIENT
Start: 2022-08-03 | End: 2022-08-03 | Stop reason: HOSPADM

## 2022-08-03 RX ORDER — DEXAMETHASONE SODIUM PHOSPHATE 10 MG/ML
INJECTION INTRAMUSCULAR; INTRAVENOUS AS NEEDED
Status: DISCONTINUED | OUTPATIENT
Start: 2022-08-03 | End: 2022-08-03 | Stop reason: SURG

## 2022-08-03 RX ORDER — LIDOCAINE HYDROCHLORIDE 20 MG/ML
INJECTION, SOLUTION INFILTRATION; PERINEURAL AS NEEDED
Status: DISCONTINUED | OUTPATIENT
Start: 2022-08-03 | End: 2022-08-03 | Stop reason: SURG

## 2022-08-03 RX ORDER — IBUPROFEN 600 MG/1
600 TABLET ORAL ONCE AS NEEDED
Status: DISCONTINUED | OUTPATIENT
Start: 2022-08-03 | End: 2022-08-03 | Stop reason: HOSPADM

## 2022-08-03 RX ORDER — ACETAMINOPHEN 160 MG/5ML
975 SOLUTION ORAL EVERY 6 HOURS
Status: DISCONTINUED | OUTPATIENT
Start: 2022-08-03 | End: 2022-08-05 | Stop reason: HOSPADM

## 2022-08-03 RX ORDER — CEFAZOLIN SODIUM IN 0.9 % NACL 3 G/100 ML
3 INTRAVENOUS SOLUTION, PIGGYBACK (ML) INTRAVENOUS
Status: COMPLETED | OUTPATIENT
Start: 2022-08-03 | End: 2022-08-03

## 2022-08-03 RX ORDER — ALBUTEROL SULFATE 2.5 MG/3ML
2.5 SOLUTION RESPIRATORY (INHALATION) EVERY 4 HOURS PRN
Status: DISCONTINUED | OUTPATIENT
Start: 2022-08-03 | End: 2022-08-05 | Stop reason: HOSPADM

## 2022-08-03 RX ORDER — ONDANSETRON 4 MG/1
4 TABLET, FILM COATED ORAL EVERY 4 HOURS PRN
Status: DISCONTINUED | OUTPATIENT
Start: 2022-08-03 | End: 2022-08-05 | Stop reason: HOSPADM

## 2022-08-03 RX ADMIN — FAMOTIDINE 20 MG: 10 INJECTION INTRAVENOUS at 22:08

## 2022-08-03 RX ADMIN — PROPOFOL 100 MCG/KG/MIN: 10 INJECTION, EMULSION INTRAVENOUS at 13:19

## 2022-08-03 RX ADMIN — DEXAMETHASONE SODIUM PHOSPHATE 8 MG: 10 INJECTION INTRAMUSCULAR; INTRAVENOUS at 13:19

## 2022-08-03 RX ADMIN — FENTANYL CITRATE 50 MCG: 50 INJECTION INTRAMUSCULAR; INTRAVENOUS at 15:17

## 2022-08-03 RX ADMIN — MAGNESIUM SULFATE HEPTAHYDRATE 2 G: 500 INJECTION, SOLUTION INTRAMUSCULAR; INTRAVENOUS at 13:30

## 2022-08-03 RX ADMIN — METOCLOPRAMIDE HYDROCHLORIDE 10 MG: 5 INJECTION INTRAMUSCULAR; INTRAVENOUS at 22:08

## 2022-08-03 RX ADMIN — PROPOFOL 50 MG: 10 INJECTION, EMULSION INTRAVENOUS at 13:10

## 2022-08-03 RX ADMIN — SCOPALAMINE 1 PATCH: 1 PATCH, EXTENDED RELEASE TRANSDERMAL at 10:23

## 2022-08-03 RX ADMIN — SODIUM CHLORIDE, POTASSIUM CHLORIDE, SODIUM LACTATE AND CALCIUM CHLORIDE 100 ML/HR: 600; 310; 30; 20 INJECTION, SOLUTION INTRAVENOUS at 17:01

## 2022-08-03 RX ADMIN — ONDANSETRON 4 MG: 2 INJECTION INTRAMUSCULAR; INTRAVENOUS at 13:19

## 2022-08-03 RX ADMIN — KETAMINE HYDROCHLORIDE 20 MG: 10 INJECTION INTRAMUSCULAR; INTRAVENOUS at 13:17

## 2022-08-03 RX ADMIN — CHLORHEXIDINE GLUCONATE 15 ML: 1.2 SOLUTION ORAL at 10:23

## 2022-08-03 RX ADMIN — SODIUM CHLORIDE, POTASSIUM CHLORIDE, SODIUM LACTATE AND CALCIUM CHLORIDE 500 ML: 600; 310; 30; 20 INJECTION, SOLUTION INTRAVENOUS at 10:50

## 2022-08-03 RX ADMIN — HYDROMORPHONE HYDROCHLORIDE 0.5 MG: 1 INJECTION, SOLUTION INTRAMUSCULAR; INTRAVENOUS; SUBCUTANEOUS at 17:30

## 2022-08-03 RX ADMIN — SODIUM CHLORIDE, POTASSIUM CHLORIDE, SODIUM LACTATE AND CALCIUM CHLORIDE 150 ML/HR: 600; 310; 30; 20 INJECTION, SOLUTION INTRAVENOUS at 20:57

## 2022-08-03 RX ADMIN — HYDROMORPHONE HYDROCHLORIDE 0.5 MG: 1 INJECTION, SOLUTION INTRAMUSCULAR; INTRAVENOUS; SUBCUTANEOUS at 17:07

## 2022-08-03 RX ADMIN — SODIUM CHLORIDE, POTASSIUM CHLORIDE, SODIUM LACTATE AND CALCIUM CHLORIDE 100 ML/HR: 600; 310; 30; 20 INJECTION, SOLUTION INTRAVENOUS at 12:07

## 2022-08-03 RX ADMIN — ACETAMINOPHEN 1000 MG: 10 INJECTION, SOLUTION INTRAVENOUS at 14:10

## 2022-08-03 RX ADMIN — ACETAMINOPHEN 1000 MG: 500 TABLET, FILM COATED ORAL at 22:07

## 2022-08-03 RX ADMIN — ONDANSETRON 4 MG: 2 INJECTION INTRAMUSCULAR; INTRAVENOUS at 22:05

## 2022-08-03 RX ADMIN — PROPOFOL 50 MG: 10 INJECTION, EMULSION INTRAVENOUS at 13:12

## 2022-08-03 RX ADMIN — SUGAMMADEX 200 MG: 100 INJECTION, SOLUTION INTRAVENOUS at 14:02

## 2022-08-03 RX ADMIN — ONDANSETRON 4 MG: 2 INJECTION INTRAMUSCULAR; INTRAVENOUS at 16:40

## 2022-08-03 RX ADMIN — PROPOFOL 200 MG: 10 INJECTION, EMULSION INTRAVENOUS at 13:08

## 2022-08-03 RX ADMIN — PANTOPRAZOLE SODIUM 40 MG: 40 INJECTION, POWDER, FOR SOLUTION INTRAVENOUS at 10:51

## 2022-08-03 RX ADMIN — GABAPENTIN 300 MG: 250 SOLUTION ORAL at 10:23

## 2022-08-03 RX ADMIN — FENTANYL CITRATE 50 MCG: 50 INJECTION INTRAMUSCULAR; INTRAVENOUS at 13:30

## 2022-08-03 RX ADMIN — METOCLOPRAMIDE 10 MG: 5 INJECTION, SOLUTION INTRAMUSCULAR; INTRAVENOUS at 10:51

## 2022-08-03 RX ADMIN — ROCURONIUM BROMIDE 50 MG: 50 INJECTION INTRAVENOUS at 13:09

## 2022-08-03 RX ADMIN — SODIUM CHLORIDE, POTASSIUM CHLORIDE, SODIUM LACTATE AND CALCIUM CHLORIDE: 600; 310; 30; 20 INJECTION, SOLUTION INTRAVENOUS at 13:59

## 2022-08-03 RX ADMIN — GABAPENTIN 300 MG: 300 CAPSULE ORAL at 22:07

## 2022-08-03 RX ADMIN — CEFAZOLIN 3 G: 10 INJECTION, POWDER, FOR SOLUTION INTRAVENOUS at 12:55

## 2022-08-03 RX ADMIN — FENTANYL CITRATE 50 MCG: 50 INJECTION INTRAMUSCULAR; INTRAVENOUS at 14:45

## 2022-08-03 RX ADMIN — LIDOCAINE HYDROCHLORIDE 100 MG: 20 INJECTION, SOLUTION INFILTRATION; PERINEURAL at 13:08

## 2022-08-03 RX ADMIN — MIRTAZAPINE 15 MG: 15 TABLET, ORALLY DISINTEGRATING ORAL at 22:08

## 2022-08-03 RX ADMIN — SODIUM CHLORIDE, POTASSIUM CHLORIDE, SODIUM LACTATE AND CALCIUM CHLORIDE 150 ML/HR: 600; 310; 30; 20 INJECTION, SOLUTION INTRAVENOUS at 23:14

## 2022-08-03 NOTE — ANESTHESIA PROCEDURE NOTES
Airway  Urgency: elective    Date/Time: 8/3/2022 1:14 PM  Difficult airway    General Information and Staff    Patient location during procedure: OR  Anesthesiologist: Sebastian De Santiago DO  CRNA/CAA: Sebas Mccarthy CRNA    Indications and Patient Condition  Indications for airway management: airway protection    Preoxygenated: yes  MILS not maintained throughout  Mask difficulty assessment: 1 - vent by mask    Final Airway Details  Final airway type: endotracheal airway      Successful airway: ETT  Cuffed: yes   Successful intubation technique: direct laryngoscopy  Facilitating devices/methods: Bougie and anterior pressure/BURP  Endotracheal tube insertion site: oral  Blade: Matilde  Blade size: 3  ETT size (mm): 7.5  Cormack-Lehane Classification: grade III - view of epiglottis only  Placement verified by: capnometry   Cuff volume (mL): 8  Measured from: lips  ETT/EBT  to lips (cm): 21  Number of attempts at approach: 2  Assessment: lips, teeth, and gum same as pre-op

## 2022-08-03 NOTE — ANESTHESIA POSTPROCEDURE EVALUATION
Patient: Jessica Mcdowell    Procedure Summary     Date: 08/03/22 Room / Location:  [facility]  [facility]  [facility]    Anesthesia Start: 1258 Anesthesia Stop: 1424    Procedure: GASTRIC SLEEVE LAPAROSCOPIC OR ROBOTIC, HIATAL HERNIA REPAIR (N/A Abdomen) Diagnosis:       Obesity, Class III, BMI 40-49.9 (morbid obesity) (HCC)      (Obesity, Class III, BMI 40-49.9 (morbid obesity) ([facility]) [E66.01])    Surgeons: Randal Calix Jr., MD Provider: Sebastian De Santiago DO    Anesthesia Type: general ASA Status: 3          Anesthesia Type: general    Vitals  Vitals Value Taken Time   /67 08/03/22 1630   Temp 36.6 °C (97.8 °F) 08/03/22 1422   Pulse 64 08/03/22 1700   Resp 18 08/03/22 1630   SpO2 100 % 08/03/22 1630   Vitals shown include unvalidated device data.        Post Anesthesia Care and Evaluation    Patient location during evaluation: bedside  Patient participation: complete - patient participated  Level of consciousness: awake and alert  Pain management: adequate    Airway patency: patent  Anesthetic complications: No anesthetic complications  PONV Status: controlled  Cardiovascular status: acceptable  Respiratory status: acceptable  Hydration status: acceptable

## 2022-08-03 NOTE — OP NOTE
PREOPERATIVE DIAGNOSIS:  Morbid obesity with multiple comorbidities as referenced in the most recent history and physical.    POSTOPERATIVE DIAGNOSIS:  Morbid obesity with multiple comorbidities as referenced in the most recent history and physical. Sliding Hiatal hernia    PROCEDURES PERFORMED:  1.  Robotic assisted laparoscopic sleeve gastrectomy with Titan Stapler # [id number]  2.  Robotic sliding hiatal hernia repair  2.  Tisseel application    SURGEON:  Randal Calix Jr., FACS, LISAS    ASSISTANT: VITALY Kendrick      Surgery assisted and facilitated by a certified physician assistant, who directly resulted in a decreased operative time, anesthetic time, wound exposure, and possibly of an operative wound infection, thereby decreasing patient morbidity and ultimately total expenditures.  The surgical assistant assisted in placement of trochars, take down of the gastrocolic omentum, short gastric vessels and dissection at the angle of His.  Also assisted in retraction of the stomach during stapling so as not to kink the gastric sleeve.  Also assisted in removing of the gastric specimen, closure of the fascial defect as well as closure of the skin incisions.    ANESTHESIA:  General endotracheal and TAP block with Ropivacaine mixture    ESTIMATED BLOOD LOSS:   Less than 25 mL unless dictated below.    FLUIDS:  Crystalloids.    SPECIMENS:  Gastric remnant    DRAINS:  None.    COUNTS:  Correct.    COMPLICATIONS:  None.    INDICATIONS:  This patient with morbid obesity and associated comorbidities presents for elective laparoscopic,robotic, possible open sleeve gastrectomy.  The patient has received medical clearance to proceed.  The patient has undergone our extensive educational process and consent process and wishes to proceed.    DESCRIPTION OF PROCEDURE:  The patient was brought to the operating room and placed supine upon the operating room table. SCD hose were placed.  The patient underwent uneventful general  endotracheal anesthesia per the anesthesiology staff. The abdomen was prepped with ChloraPrep and draped in the usual sterile fashion. Anesthesia staff passed a 38-Chinese bougie into the stomach to decompress the stomach and then was pulled back to the esophagus.      An 8 mm transverse incision was made just to the left of midline.  The peritoneal cavity was entered under direct camera visualization using a 5  mm 0° laparoscope and an Optiview trocar.  The abdomen was then insufflated to a pressure of 15 mmHg with CO2 gas.  Exploratory laparoscopy revealed no evidence of injury from the entrance technique and no significant abnormalities unless addendum dictated below.  An angled laparoscope was then used.  The patient was placed in reverse Trendelenburg position.  Under direct camera visualization two 8 mm trocar was placed in the left lateral midabdominal position.  A 12 mm trocar was placed in the right abdomen.  A Huey retractor was placed through an epigastric incision and used to elevate the left lobe of the liver.      Next the 30 degree 8 mm scope was brought into the 8 mm port just to the left of the umbilicus after the corresponding trocar was docked to the da Halina robot.  Targeting then took place and then the rest of the trochars were docked to the robot and angled into position.  Instruments were brought in through the trochars in place for laparoscopic view.       I then took control of the surgical console. At this point, approximately long term along the greater curvature, the gastrocolic omentum was divided with the Vessel Sealer and this proceeded superiorly to the angle of His taking down the short gastric vessels.  All posterior attachments of the lesser sac and posterior aspect of the stomach to the pancreas were taken down as well.      Dissection then proceeded medially taking down the greater curvature with the vessel sealer to just proximal to the pylorus.  The 38-Chinese bougie was  passed back down into the stomach to aid in sizing the sleeve.       The right 12 mm intuitive trocar was removed and replaced with the 19 mm trocar.  The stomach was marked with indelible ink 3 cm at the incisura and approximately 4 to 5 cm from the pylorus.  The Titan stapler was advanced into the abdomen and placed into position and used to create the gastric sleeve.  The stapler was then removed after firing.    Insufflation of the stomach under water was performed and there was no evidence of leak.    Tisseel was then sprayed on the staple line.    The specimen was removed through the 19 mm port site.  The liver retractor was removed. The fascia at the 19 mm trocar site incision that was used for extraction was closed with a single 0 Vicryl suture in a figure-of-eight fashion placed under direct laparoscopic camera visualization with a suture passer and tying the knot extracorporeally.  The fascia in the area was infiltrated with local anesthesia. All incisions were then infiltrated with local anesthetic. The remaining trocars were removed under direct camera visualization with no bleeding noted from their sites.  The abdomen was desufflated of gas. The skin in each incision was closed using 4-0 antibiotic impregnated Monocryl in a subcuticular fashion followed by Dermabond.  The patient tolerated the procedure well without complication and was taken to the recovery room in stable condition.  All sponge, needle and instrument counts were correct.     The patient was noted to have a hiatal hernia.  The phrenoesophageal membrane was opened up with electrocautery and the right and left crura were cleaned off using sharp and blunt dissection.  The peritoneum overlying the right jose was incised and the plane along the hernia sac was developed.  The dissection then extended anteriorly and laterally to the left jose.  The base of the crural confluence was dissected free of adhesions to the hernia sac.  The hernia sac  was carefully dissected into the mediastinum with caudal traction.  The interfaces between the pleura, pericardium, spine, and aorta were developed as a dissection was carried cephalically to the top of the hernia sac.  Sac contents were completely reduced back into the abdominal cavity.  Careful attention was made not to injure the vagus nerve.  The esophagus was identified and dissected circumferentially and along its previous stomach course in order to reduce tension, allowing the gastroesophageal junction to rest comfortably within the abdominal cavity.  The gastrosplenic ligament and the short gastric vessels were divided with the Vessel sealer device.  The retroesophageal window was developed and the esophagus was retracted caudally.  The crural pillars were then approximated with a 0-silk suture.  Anterior reinforcement was performed as well if needed.

## 2022-08-03 NOTE — ANESTHESIA PREPROCEDURE EVALUATION
Anesthesia Evaluation     Patient summary reviewed   no history of anesthetic complications:  NPO Solid Status: > 8 hours  NPO Liquid Status: > 2 hours           Airway   Mallampati: II  TM distance: >3 FB  Neck ROM: full  no difficulty expected  Dental - normal exam     Pulmonary     breath sounds clear to auscultation  (+) asthma (with colds and allergies),  (-) shortness of breath, recent URI, not a smoker  Cardiovascular   Exercise tolerance: good (4-7 METS)    ECG reviewed  Rhythm: regular  Rate: normal    (+) hypertension, valvular problems/murmurs murmur, past MI (2012, ? in ER, not on any tests following)  >12 months,   (-) dysrhythmias, angina, cardiac stents      Neuro/Psych  (-) seizures, TIA, CVA  GI/Hepatic/Renal/Endo    (+) morbid obesity,    (-) liver disease, no renal diseaseDiabetes: IFG.    Musculoskeletal     (-) neck stiffness  Abdominal    Substance History      OB/GYN          Other   arthritis,                      Anesthesia Plan    ASA 3     general     intravenous induction     Anesthetic plan, risks, benefits, and alternatives have been provided, discussed and informed consent has been obtained with: patient.    Plan discussed with CRNA.

## 2022-08-04 LAB
BACTERIA UR QL AUTO: ABNORMAL /HPF
BILIRUB UR QL STRIP: NEGATIVE
CLARITY UR: CLEAR
COLOR UR: YELLOW
GLUCOSE UR STRIP-MCNC: NEGATIVE MG/DL
HGB UR QL STRIP.AUTO: ABNORMAL
HYALINE CASTS UR QL AUTO: ABNORMAL /LPF
KETONES UR QL STRIP: NEGATIVE
LAB AP CASE REPORT: NORMAL
LEUKOCYTE ESTERASE UR QL STRIP.AUTO: NEGATIVE
NITRITE UR QL STRIP: NEGATIVE
PATH REPORT.FINAL DX SPEC: NORMAL
PATH REPORT.GROSS SPEC: NORMAL
PH UR STRIP.AUTO: 5.5 [PH] (ref 5–8)
PROT UR QL STRIP: ABNORMAL
RBC # UR STRIP: ABNORMAL /HPF
REF LAB TEST METHOD: ABNORMAL
SP GR UR STRIP: >=1.03 (ref 1–1.03)
SQUAMOUS #/AREA URNS HPF: ABNORMAL /HPF
UROBILINOGEN UR QL STRIP: ABNORMAL
WBC # UR STRIP: ABNORMAL /HPF

## 2022-08-04 PROCEDURE — 80053 COMPREHEN METABOLIC PANEL: CPT | Performed by: SURGERY

## 2022-08-04 PROCEDURE — 25010000002 CYANOCOBALAMIN PER 1000 MCG: Performed by: SURGERY

## 2022-08-04 PROCEDURE — 81001 URINALYSIS AUTO W/SCOPE: CPT | Performed by: NURSE PRACTITIONER

## 2022-08-04 PROCEDURE — 25010000002 THIAMINE PER 100 MG: Performed by: SURGERY

## 2022-08-04 PROCEDURE — 85025 COMPLETE CBC W/AUTO DIFF WBC: CPT | Performed by: SURGERY

## 2022-08-04 PROCEDURE — 25010000002 PROCHLORPERAZINE 10 MG/2ML SOLUTION: Performed by: SURGERY

## 2022-08-04 PROCEDURE — 83735 ASSAY OF MAGNESIUM: CPT | Performed by: SURGERY

## 2022-08-04 PROCEDURE — 25010000002 ONDANSETRON PER 1 MG: Performed by: SURGERY

## 2022-08-04 PROCEDURE — 84100 ASSAY OF PHOSPHORUS: CPT | Performed by: SURGERY

## 2022-08-04 PROCEDURE — 25010000002 ENOXAPARIN PER 10 MG: Performed by: SURGERY

## 2022-08-04 PROCEDURE — 25010000002 METOCLOPRAMIDE PER 10 MG: Performed by: SURGERY

## 2022-08-04 PROCEDURE — 25010000002 HYDROMORPHONE PER 4 MG: Performed by: SURGERY

## 2022-08-04 RX ORDER — ONDANSETRON 4 MG/1
4 TABLET, ORALLY DISINTEGRATING ORAL EVERY 4 HOURS PRN
Qty: 14 TABLET | Refills: 0 | Status: SHIPPED | OUTPATIENT
Start: 2022-08-04

## 2022-08-04 RX ORDER — HYDROMORPHONE HYDROCHLORIDE 2 MG/1
2 TABLET ORAL EVERY 4 HOURS PRN
Qty: 10 TABLET | Refills: 0 | Status: SHIPPED | OUTPATIENT
Start: 2022-08-04 | End: 2022-11-09

## 2022-08-04 RX ORDER — METOCLOPRAMIDE 10 MG/1
10 TABLET ORAL
Qty: 16 TABLET | Refills: 0 | Status: SHIPPED | OUTPATIENT
Start: 2022-08-04 | End: 2022-09-09

## 2022-08-04 RX ORDER — ENOXAPARIN SODIUM 100 MG/ML
40 INJECTION SUBCUTANEOUS
Qty: 5.6 ML | Refills: 0 | Status: SHIPPED | OUTPATIENT
Start: 2022-08-04 | End: 2022-08-05 | Stop reason: HOSPADM

## 2022-08-04 RX ORDER — ENOXAPARIN SODIUM 100 MG/ML
40 INJECTION SUBCUTANEOUS
Qty: 0.4 ML | Refills: 0 | Status: SHIPPED | OUTPATIENT
Start: 2022-08-04 | End: 2022-08-04 | Stop reason: SDUPTHER

## 2022-08-04 RX ORDER — ENOXAPARIN SODIUM 100 MG/ML
40 INJECTION SUBCUTANEOUS
Qty: 0.4 ML | Refills: 0 | COMMUNITY
Start: 2022-08-04 | End: 2022-08-04 | Stop reason: SDUPTHER

## 2022-08-04 RX ADMIN — GABAPENTIN 300 MG: 300 CAPSULE ORAL at 14:45

## 2022-08-04 RX ADMIN — SODIUM CHLORIDE, POTASSIUM CHLORIDE, SODIUM LACTATE AND CALCIUM CHLORIDE 150 ML/HR: 600; 310; 30; 20 INJECTION, SOLUTION INTRAVENOUS at 10:56

## 2022-08-04 RX ADMIN — SODIUM CHLORIDE, POTASSIUM CHLORIDE, SODIUM LACTATE AND CALCIUM CHLORIDE 150 ML/HR: 600; 310; 30; 20 INJECTION, SOLUTION INTRAVENOUS at 07:30

## 2022-08-04 RX ADMIN — METOCLOPRAMIDE HYDROCHLORIDE 10 MG: 5 INJECTION INTRAMUSCULAR; INTRAVENOUS at 08:53

## 2022-08-04 RX ADMIN — PROCHLORPERAZINE EDISYLATE 10 MG: 5 INJECTION INTRAMUSCULAR; INTRAVENOUS at 05:48

## 2022-08-04 RX ADMIN — CYANOCOBALAMIN 1000 MCG: 1000 INJECTION, SOLUTION INTRAMUSCULAR; SUBCUTANEOUS at 08:52

## 2022-08-04 RX ADMIN — ONDANSETRON 4 MG: 2 INJECTION INTRAMUSCULAR; INTRAVENOUS at 02:26

## 2022-08-04 RX ADMIN — METOCLOPRAMIDE HYDROCHLORIDE 10 MG: 5 INJECTION INTRAMUSCULAR; INTRAVENOUS at 21:32

## 2022-08-04 RX ADMIN — GABAPENTIN 300 MG: 300 CAPSULE ORAL at 21:32

## 2022-08-04 RX ADMIN — FAMOTIDINE 20 MG: 10 INJECTION INTRAVENOUS at 08:52

## 2022-08-04 RX ADMIN — SODIUM CHLORIDE, POTASSIUM CHLORIDE, SODIUM LACTATE AND CALCIUM CHLORIDE 150 ML/HR: 600; 310; 30; 20 INJECTION, SOLUTION INTRAVENOUS at 19:31

## 2022-08-04 RX ADMIN — ENOXAPARIN SODIUM 40 MG: 100 INJECTION SUBCUTANEOUS at 14:48

## 2022-08-04 RX ADMIN — FAMOTIDINE 20 MG: 10 INJECTION INTRAVENOUS at 20:31

## 2022-08-04 RX ADMIN — LISINOPRIL 5 MG: 5 TABLET ORAL at 14:48

## 2022-08-04 RX ADMIN — ONDANSETRON 4 MG: 2 INJECTION INTRAMUSCULAR; INTRAVENOUS at 14:46

## 2022-08-04 RX ADMIN — METOCLOPRAMIDE HYDROCHLORIDE 10 MG: 5 INJECTION INTRAMUSCULAR; INTRAVENOUS at 03:20

## 2022-08-04 RX ADMIN — ACETAMINOPHEN 1000 MG: 500 TABLET, FILM COATED ORAL at 03:20

## 2022-08-04 RX ADMIN — FOLIC ACID 250 ML/HR: 5 INJECTION, SOLUTION INTRAMUSCULAR; INTRAVENOUS; SUBCUTANEOUS at 00:07

## 2022-08-04 RX ADMIN — HYDROMORPHONE HYDROCHLORIDE 0.5 MG: 1 INJECTION, SOLUTION INTRAMUSCULAR; INTRAVENOUS; SUBCUTANEOUS at 20:31

## 2022-08-04 RX ADMIN — MIRTAZAPINE 15 MG: 15 TABLET, ORALLY DISINTEGRATING ORAL at 21:33

## 2022-08-04 RX ADMIN — ONDANSETRON 4 MG: 2 INJECTION INTRAMUSCULAR; INTRAVENOUS at 08:53

## 2022-08-04 RX ADMIN — METOCLOPRAMIDE HYDROCHLORIDE 10 MG: 5 INJECTION INTRAMUSCULAR; INTRAVENOUS at 14:46

## 2022-08-04 NOTE — PROGRESS NOTES
"Subjective:       Jessica Mcdowell  is post op day one status post procedure listed. Patient denies shortness of air and lower extremity pain. Feels better than yesterday. Vomiting this morning and nausea throughout the day today. Ambulating well and using incentive spirometer.       Objective:        /91 (BP Location: Left arm, Patient Position: Sitting)   Pulse 52   Temp 98.7 °F (37.1 °C) (Oral)   Resp 16   Ht 172.7 cm (68\")   Wt 126 kg (277 lb 12.5 oz)   LMP 01/08/2018 (Exact Date)   SpO2 96%   BMI 42.24 kg/m²     General:  alert, appears stated age and cooperative   Abdomen: soft, bowel sounds active, appropriate tenderness   Incision:   healing well, no drainage, no erythema, no hernia, no seroma, no swelling, no dehiscence, incision well approximated   Heart: Regular rate   Lungs: Clear to auscultation bilaterally     I reviewed the patient's new clinical results.     Lab Results (last 24 hours)     Procedure Component Value Units Date/Time    Urinalysis, Microscopic Only - Urine, Clean Catch [[id number]]  (Abnormal) Collected: 08/04/22 1251    Specimen: Urine, Clean Catch Updated: 08/04/22 1446     RBC, UA 6-12 /HPF      WBC, UA 0-2 /HPF      Bacteria, UA None Seen /HPF      Squamous Epithelial Cells, UA 0-2 /HPF      Hyaline Casts, UA 0-2 /LPF      Methodology Automated Microscopy    Urinalysis With Microscopic If Indicated (No Culture) - Urine, Clean Catch [[id number]]  (Abnormal) Collected: 08/04/22 1251    Specimen: Urine, Clean Catch Updated: 08/04/22 1446     Color, UA Yellow     Appearance, UA Clear     pH, UA 5.5     Specific Gravity, UA >=1.030     Glucose, UA Negative     Ketones, UA Negative     Bilirubin, UA Negative     Blood, UA Moderate (2+)     Protein, UA 30 mg/dL (1+)     Leuk Esterase, UA Negative     Nitrite, UA Negative     Urobilinogen, UA 1.0 E.U./dL    Tissue Pathology Exam [[id number]] Collected: 08/03/22 1354    Specimen: Tissue from Stomach Updated: 08/04/22 1249     " "Case Report --     Surgical Pathology Report                         Case: [id number]                                  Authorizing Provider:  Randal Calix Jr.,   Collected:           08/03/2022 01:54 PM                                 MD                                                                           Ordering Location:     [facility]  Received:            08/03/2022 02:48 PM                                 [facility] OR                                                                       Pathologist:           Keon Asif MD                                                         Specimen:    Stomach, PORTION OF STOMACH                                                                 Final Diagnosis --     1. Stomach, Partial Gastrectomy: Benign stomach with   A. Mild chronic gastritis.   B. No intestinal metaplasia.   C. No Helicobacter pylori.    kassie/jse        Gross Description --     1. Stomach.  Received in formalin labeled with the patient's name and designated \"portion of stomach\" the specimen consists of a rubbery pink-tan partially epithelialized portion of stomach measuring 23.2 x 4.7 x 3.5 cm. The serosal surface is smooth and glistening with a minimal amount of fatty yellow adipose. The surgical margin is closed by a series of metal staples. Opening the margin reveals approximately 10 cc of mucinous blood-tinged fluid with normal appearing pale tan folding rugae.  No mass or polyp formation is grossly identified. Multiple representative sections of mucosa are submitted in cassettes 1A-1B with representative sections of surgical margin submitted in cassette 1C.    [email or web address]               Assessment:      Postoperative course complicated by nausea and vomiting this morning.       Plan:   1. Start diet  2. Continue with ambulation and Incentive spirometry  3. Plan for d/c tomorrow morning later today if tolerating diet  4. Continue lovenox    Patient was seen and " examined by Dr. Calix.

## 2022-08-04 NOTE — PLAN OF CARE
Goal Outcome Evaluation:  Plan of Care Reviewed With: patient        Progress: improving  Outcome Evaluation: VSS, A/O, on room air, lap sites X 5 KISHAN, ambulated in hallway w/ staff, pain treated w/ scheduled pain meds, had some N/V, zofran given w/o much relief, plan to give compezine, voiding, IVF infusing, daughter at bedside

## 2022-08-05 ENCOUNTER — READMISSION MANAGEMENT (OUTPATIENT)
Dept: CALL CENTER | Facility: HOSPITAL | Age: 52
End: 2022-08-05

## 2022-08-05 VITALS
WEIGHT: 278.44 LBS | DIASTOLIC BLOOD PRESSURE: 88 MMHG | HEART RATE: 60 BPM | HEIGHT: 68 IN | SYSTOLIC BLOOD PRESSURE: 179 MMHG | TEMPERATURE: 97.7 F | RESPIRATION RATE: 16 BRPM | BODY MASS INDEX: 42.2 KG/M2 | OXYGEN SATURATION: 93 %

## 2022-08-05 LAB
ALBUMIN SERPL-MCNC: 3.4 G/DL (ref 3.5–5.2)
ALBUMIN/GLOB SERPL: 1.2 G/DL
ALP SERPL-CCNC: 68 U/L (ref 39–117)
ALT SERPL W P-5'-P-CCNC: 19 U/L (ref 1–33)
ANION GAP SERPL CALCULATED.3IONS-SCNC: 9 MMOL/L (ref 5–15)
AST SERPL-CCNC: 25 U/L (ref 1–32)
BASOPHILS # BLD AUTO: 0.03 10*3/MM3 (ref 0–0.2)
BASOPHILS NFR BLD AUTO: 0.5 % (ref 0–1.5)
BILIRUB SERPL-MCNC: 0.5 MG/DL (ref 0–1.2)
BUN SERPL-MCNC: 10 MG/DL (ref 6–20)
BUN/CREAT SERPL: 9.7 (ref 7–25)
CALCIUM SPEC-SCNC: 8.7 MG/DL (ref 8.6–10.5)
CHLORIDE SERPL-SCNC: 105 MMOL/L (ref 98–107)
CO2 SERPL-SCNC: 26 MMOL/L (ref 22–29)
CREAT SERPL-MCNC: 1.03 MG/DL (ref 0.57–1)
DEPRECATED RDW RBC AUTO: 39.5 FL (ref 37–54)
EGFRCR SERPLBLD CKD-EPI 2021: 66.8 ML/MIN/1.73
EOSINOPHIL # BLD AUTO: 0.17 10*3/MM3 (ref 0–0.4)
EOSINOPHIL NFR BLD AUTO: 2.8 % (ref 0.3–6.2)
ERYTHROCYTE [DISTWIDTH] IN BLOOD BY AUTOMATED COUNT: 13.9 % (ref 12.3–15.4)
GLOBULIN UR ELPH-MCNC: 2.9 GM/DL
GLUCOSE SERPL-MCNC: 92 MG/DL (ref 65–99)
HCT VFR BLD AUTO: 34 % (ref 34–46.6)
HGB BLD-MCNC: 11.3 G/DL (ref 12–15.9)
LYMPHOCYTES # BLD AUTO: 1.16 10*3/MM3 (ref 0.7–3.1)
LYMPHOCYTES NFR BLD AUTO: 19.4 % (ref 19.6–45.3)
MAGNESIUM SERPL-MCNC: 1.7 MG/DL (ref 1.6–2.6)
MCH RBC QN AUTO: 26.5 PG (ref 26.6–33)
MCHC RBC AUTO-ENTMCNC: 33.2 G/DL (ref 31.5–35.7)
MCV RBC AUTO: 79.6 FL (ref 79–97)
MONOCYTES # BLD AUTO: 0.47 10*3/MM3 (ref 0.1–0.9)
MONOCYTES NFR BLD AUTO: 7.9 % (ref 5–12)
NEUTROPHILS NFR BLD AUTO: 4.13 10*3/MM3 (ref 1.7–7)
NEUTROPHILS NFR BLD AUTO: 69.2 % (ref 42.7–76)
PHOSPHATE SERPL-MCNC: 2.2 MG/DL (ref 2.5–4.5)
PLATELET # BLD AUTO: 138 10*3/MM3 (ref 140–450)
PMV BLD AUTO: 11.8 FL (ref 6–12)
POTASSIUM SERPL-SCNC: 3.5 MMOL/L (ref 3.5–5.2)
PROT SERPL-MCNC: 6.3 G/DL (ref 6–8.5)
RBC # BLD AUTO: 4.27 10*6/MM3 (ref 3.77–5.28)
SODIUM SERPL-SCNC: 140 MMOL/L (ref 136–145)
WBC NRBC COR # BLD: 5.97 10*3/MM3 (ref 3.4–10.8)

## 2022-08-05 PROCEDURE — 25010000002 HYDROMORPHONE PER 4 MG: Performed by: SURGERY

## 2022-08-05 PROCEDURE — 25010000002 METOCLOPRAMIDE PER 10 MG: Performed by: SURGERY

## 2022-08-05 RX ADMIN — GABAPENTIN 300 MG: 300 CAPSULE ORAL at 04:48

## 2022-08-05 RX ADMIN — LISINOPRIL 5 MG: 5 TABLET ORAL at 06:02

## 2022-08-05 RX ADMIN — SODIUM CHLORIDE, POTASSIUM CHLORIDE, SODIUM LACTATE AND CALCIUM CHLORIDE 150 ML/HR: 600; 310; 30; 20 INJECTION, SOLUTION INTRAVENOUS at 02:21

## 2022-08-05 RX ADMIN — HYDROMORPHONE HYDROCHLORIDE 0.5 MG: 1 INJECTION, SOLUTION INTRAMUSCULAR; INTRAVENOUS; SUBCUTANEOUS at 02:21

## 2022-08-05 RX ADMIN — METOCLOPRAMIDE HYDROCHLORIDE 10 MG: 5 INJECTION INTRAMUSCULAR; INTRAVENOUS at 03:27

## 2022-08-05 RX ADMIN — Medication 1 PACKET: at 09:00

## 2022-08-05 NOTE — PAYOR COMM NOTE
"JulyLuis santana (51 y.o. Female)     DC SUMMARY FOR [id number]    CONTACT PRISCILA MANRIQUE  P# [phone]  F# [phone]              Date of Birth   1970    Social Security Number       Address   [facility] [address] [address]    Home Phone   [phone]    MRN   [id number]       Spiritism   Restorationism    Marital Status   Single                            Admission Date   8/3/22    Admission Type   Elective    Admitting Provider   Randal Calix Jr., MD    Attending Provider       Department, Room/Bed   [facility] [address], 79/1       Discharge Date   8/5/2022    Discharge Disposition   Home or Self Care    Discharge Destination                               Attending Provider: (none)   Allergies: Iodine, Shellfish-derived Products    Isolation: None   Infection: None   Code Status: Prior   Advance Care Planning Activity    Ht: 172.7 cm (68\")   Wt: 126 kg (278 lb 7.1 oz)    Admission Cmt: None   Principal Problem: Obesity, Class III, BMI 40-49.9 (morbid obesity) (HCC) [E66.01]                 Active Insurance as of 8/3/2022     Primary Coverage     Payor Plan Insurance Group Employer/Plan Group    [employer] [employer] EMPLOYEE [id number]     Payor Plan Address Payor Plan Phone Number Payor Plan Fax Number Effective Dates    [id number] [phone]  4/1/2019 - None Entered    [facility] [address]       Subscriber Name Subscriber Birth Date Member ID       LUIS BELLAMY Y 1970 [id number]           Secondary Coverage     Payor Plan Insurance Group Employer/Plan Group    [facility] BY MANDI [facility] BY MANDI [id number]     Payor Plan Address Payor Plan Phone Number Payor Plan Fax Number Effective Dates    [address] [address]   1/1/2021 - None Entered    [facility] [id number]       Subscriber Name Subscriber Birth Date Member ID       LUIS BELLAMY 1970 [id number]                 Emergency Contacts      (Rel.) Home Phone " Work Phone Mobile Phone    Connie Mcdowell (Daughter) [phone] -- [phone]    Jose De Jesus Mcdowell (Son) [phone] -- --               Operative/Procedure Notes (last 72 hours)      Randal Calix Jr., MD at 08/03/22 1323        PREOPERATIVE DIAGNOSIS:  Morbid obesity with multiple comorbidities as referenced in the most recent history and physical.    POSTOPERATIVE DIAGNOSIS:  Morbid obesity with multiple comorbidities as referenced in the most recent history and physical. Sliding Hiatal hernia    PROCEDURES PERFORMED:  1.  Robotic assisted laparoscopic sleeve gastrectomy with Titan Stapler # [id number]  2.  Robotic sliding hiatal hernia repair  2.  Tisseel application    SURGEON:  Randal Calix Jr., FACS, [place]    ASSISTANT: VITALY Kendrick      Surgery assisted and facilitated by a certified physician assistant, who directly resulted in a decreased operative time, anesthetic time, wound exposure, and possibly of an operative wound infection, thereby decreasing patient morbidity and ultimately total expenditures.  The surgical assistant assisted in placement of trochars, take down of the gastrocolic omentum, short gastric vessels and dissection at the angle of His.  Also assisted in retraction of the stomach during stapling so as not to kink the gastric sleeve.  Also assisted in removing of the gastric specimen, closure of the fascial defect as well as closure of the skin incisions.    ANESTHESIA:  General endotracheal and TAP block with Ropivacaine mixture    ESTIMATED BLOOD LOSS:   Less than 25 mL unless dictated below.    FLUIDS:  Crystalloids.    SPECIMENS:  Gastric remnant    DRAINS:  None.    COUNTS:  Correct.    COMPLICATIONS:  None.    INDICATIONS:  This patient with morbid obesity and associated comorbidities presents for elective laparoscopic,robotic, possible open sleeve gastrectomy.  The patient has received medical clearance to proceed.  The patient has undergone our extensive  educational process and consent process and wishes to proceed.    DESCRIPTION OF PROCEDURE:  The patient was brought to the operating room and placed supine upon the operating room table. SCD hose were placed.  The patient underwent uneventful general endotracheal anesthesia per the anesthesiology staff. The abdomen was prepped with ChloraPrep and draped in the usual sterile fashion. Anesthesia staff passed a 38-Colombian bougie into the stomach to decompress the stomach and then was pulled back to the esophagus.      An 8 mm transverse incision was made just to the left of midline.  The peritoneal cavity was entered under direct camera visualization using a 5  mm 0° laparoscope and an Optiview trocar.  The abdomen was then insufflated to a pressure of 15 mmHg with CO2 gas.  Exploratory laparoscopy revealed no evidence of injury from the entrance technique and no significant abnormalities unless addendum dictated below.  An angled laparoscope was then used.  The patient was placed in reverse Trendelenburg position.  Under direct camera visualization two 8 mm trocar was placed in the left lateral midabdominal position.  A 12 mm trocar was placed in the right abdomen.  A Huey retractor was placed through an epigastric incision and used to elevate the left lobe of the liver.      Next the 30 degree 8 mm scope was brought into the 8 mm port just to the left of the umbilicus after the corresponding trocar was docked to the da Halina robot.  Targeting then took place and then the rest of the trochars were docked to the robot and angled into position.  Instruments were brought in through the trochars in place for laparoscopic view.       I then took control of the surgical console. At this point, approximately correction along the greater curvature, the gastrocolic omentum was divided with the Vessel Sealer and this proceeded superiorly to the angle of His taking down the short gastric vessels.  All posterior attachments of  the lesser sac and posterior aspect of the stomach to the pancreas were taken down as well.      Dissection then proceeded medially taking down the greater curvature with the vessel sealer to just proximal to the pylorus.  The 38-Ukrainian bougie was passed back down into the stomach to aid in sizing the sleeve.       The right 12 mm intuitive trocar was removed and replaced with the 19 mm trocar.  The stomach was marked with indelible ink 3 cm at the incisura and approximately 4 to 5 cm from the pylorus.  The Titan stapler was advanced into the abdomen and placed into position and used to create the gastric sleeve.  The stapler was then removed after firing.    Insufflation of the stomach under water was performed and there was no evidence of leak.    Tisseel was then sprayed on the staple line.    The specimen was removed through the 19 mm port site.  The liver retractor was removed. The fascia at the 19 mm trocar site incision that was used for extraction was closed with a single 0 Vicryl suture in a figure-of-eight fashion placed under direct laparoscopic camera visualization with a suture passer and tying the knot extracorporeally.  The fascia in the area was infiltrated with local anesthesia. All incisions were then infiltrated with local anesthetic. The remaining trocars were removed under direct camera visualization with no bleeding noted from their sites.  The abdomen was desufflated of gas. The skin in each incision was closed using 4-0 antibiotic impregnated Monocryl in a subcuticular fashion followed by Dermabond.  The patient tolerated the procedure well without complication and was taken to the recovery room in stable condition.  All sponge, needle and instrument counts were correct.     The patient was noted to have a hiatal hernia.  The phrenoesophageal membrane was opened up with electrocautery and the right and left crura were cleaned off using sharp and blunt dissection.  The peritoneum overlying the  "right jose was incised and the plane along the hernia sac was developed.  The dissection then extended anteriorly and laterally to the left jose.  The base of the crural confluence was dissected free of adhesions to the hernia sac.  The hernia sac was carefully dissected into the mediastinum with caudal traction.  The interfaces between the pleura, pericardium, spine, and aorta were developed as a dissection was carried cephalically to the top of the hernia sac.  Sac contents were completely reduced back into the abdominal cavity.  Careful attention was made not to injure the vagus nerve.  The esophagus was identified and dissected circumferentially and along its previous stomach course in order to reduce tension, allowing the gastroesophageal junction to rest comfortably within the abdominal cavity.  The gastrosplenic ligament and the short gastric vessels were divided with the Vessel sealer device.  The retroesophageal window was developed and the esophagus was retracted caudally.  The crural pillars were then approximated with a 0-silk suture.  Anterior reinforcement was performed as well if needed.      Electronically signed by Randal Calix Jr., MD at 08/03/22 1404          Physician Progress Notes (last 48 hours)      Monisha Zuluaga APRN at 08/04/22 1540          Subjective:      Subjective Jessica Y July  is post op day one status post procedure listed. Patient denies shortness of air and lower extremity pain. Feels better than yesterday. Vomiting this morning and nausea throughout the day today. Ambulating well and using incentive spirometer.      Objective:     Objective   /91 (BP Location: Left arm, Patient Position: Sitting)   Pulse 52   Temp 98.7 °F (37.1 °C) (Oral)   Resp 16   Ht 172.7 cm (68\")   Wt 126 kg (277 lb 12.5 oz)   LMP 01/08/2018 (Exact Date)   SpO2 96%   BMI 42.24 kg/m²     General:  alert, appears stated age and cooperative   Abdomen: soft, bowel sounds active, " appropriate tenderness   Incision:   healing well, no drainage, no erythema, no hernia, no seroma, no swelling, no dehiscence, incision well approximated   Heart: Regular rate   Lungs: Clear to auscultation bilaterally     I reviewed the patient's new clinical results.     Lab Results (last 24 hours)     Procedure Component Value Units Date/Time    Urinalysis, Microscopic Only - Urine, Clean Catch [[id number]]  (Abnormal) Collected: 08/04/22 1251    Specimen: Urine, Clean Catch Updated: 08/04/22 1446     RBC, UA 6-12 /HPF      WBC, UA 0-2 /HPF      Bacteria, UA None Seen /HPF      Squamous Epithelial Cells, UA 0-2 /HPF      Hyaline Casts, UA 0-2 /LPF      Methodology Automated Microscopy    Urinalysis With Microscopic If Indicated (No Culture) - Urine, Clean Catch [[id number]]  (Abnormal) Collected: 08/04/22 1251    Specimen: Urine, Clean Catch Updated: 08/04/22 1446     Color, UA Yellow     Appearance, UA Clear     pH, UA 5.5     Specific Gravity, UA >=1.030     Glucose, UA Negative     Ketones, UA Negative     Bilirubin, UA Negative     Blood, UA Moderate (2+)     Protein, UA 30 mg/dL (1+)     Leuk Esterase, UA Negative     Nitrite, UA Negative     Urobilinogen, UA 1.0 E.U./dL    Tissue Pathology Exam [[id number]] Collected: 08/03/22 1354    Specimen: Tissue from Stomach Updated: 08/04/22 1249     Case Report --     Surgical Pathology Report                         Case: [id number]                                  Authorizing Provider:  Randal Calix Jr.,   Collected:           08/03/2022 01:54 PM                                 MD                                                                           Ordering Location:     [facility]  Received:            08/03/2022 02:48 PM                                 [facility] OR                                                                       Pathologist:           Keon Asif MD                                                         Specimen:     "Stomach, PORTION OF STOMACH                                                                 Final Diagnosis --     1. Stomach, Partial Gastrectomy: Benign stomach with   A. Mild chronic gastritis.   B. No intestinal metaplasia.   C. No Helicobacter pylori.    kassie/jse        Gross Description --     1. Stomach.  Received in formalin labeled with the patient's name and designated \"portion of stomach\" the specimen consists of a rubbery pink-tan partially epithelialized portion of stomach measuring 23.2 x 4.7 x 3.5 cm. The serosal surface is smooth and glistening with a minimal amount of fatty yellow adipose. The surgical margin is closed by a series of metal staples. Opening the margin reveals approximately 10 cc of mucinous blood-tinged fluid with normal appearing pale tan folding rugae.  No mass or polyp formation is grossly identified. Multiple representative sections of mucosa are submitted in cassettes 1A-1B with representative sections of surgical margin submitted in cassette 1C.    hbt/uso/kassie/kds              Assessment:     Assessment Postoperative course complicated by nausea and vomiting this morning.      Plan:   1. Start diet  2. Continue with ambulation and Incentive spirometry  3. Plan for d/c tomorrow morning later today if tolerating diet  4. Continue lovenox    Patient was seen and examined by Dr. Calix.    Electronically signed by Monisha Zuluaga APRN at 08/04/22 1542       Consult Notes (last 48 hours)  Notes from 08/03/22 1542 through 08/05/22 1542   No notes of this type exist for this encounter.          Discharge Summary      Randal Calix Jr., MD at 08/05/22 0723          Discharge Summary    Patient name: Jessica Mcdowell    Medical record number: [id number]    Admission date: 8/3/2022  Discharge date:      Attending physician: Dr. Randal Calix    Primary care physician: Jun Aguillon MD    Referring physician: Randal Calix Jr., MD  [address] [place] [address] " "[id number]    Condition on discharge: Stable    Primary Diagnoses:  Morbid obesity with co-morbidities    Operative Procedure: Laparoscopic sleeve gastrectomy     Jessica Mcdowell  is post op day two status post procedure listed. Patient denies shortness of air and lower extremity pain. Feels better than yesterday. No vomiting this am. Ambulating well and using incentive spirometer.          /94 (BP Location: Left arm, Patient Position: Lying)   Pulse 58   Temp 97.7 °F (36.5 °C) (Oral)   Resp 16   Ht 172.7 cm (68\")   Wt 126 kg (278 lb 7.1 oz)   LMP 01/08/2018 (Exact Date)   SpO2 91%   BMI 42.34 kg/m²     General:  alert, appears stated age and cooperative   Abdomen: soft, bowel sounds active, appropriate tenderness   Incision:   healing well, no drainage, no erythema, no hernia, no seroma, no swelling, no dehiscence, incision well approximated   Heart: Regular rate   Lungs: Clear to auscultation bilaterally     I reviewed the patient's new clinical results.     Lab Results (last 24 hours)     Procedure Component Value Units Date/Time    Comprehensive Metabolic Panel [[id number]]  (Abnormal) Collected: 08/04/22 2356    Specimen: Blood Updated: 08/05/22 0029     Glucose 92 mg/dL      BUN 10 mg/dL      Creatinine 1.03 mg/dL      Sodium 140 mmol/L      Potassium 3.5 mmol/L      Chloride 105 mmol/L      CO2 26.0 mmol/L      Calcium 8.7 mg/dL      Total Protein 6.3 g/dL      Albumin 3.40 g/dL      ALT (SGPT) 19 U/L      AST (SGOT) 25 U/L      Alkaline Phosphatase 68 U/L      Total Bilirubin 0.5 mg/dL      Globulin 2.9 gm/dL      A/G Ratio 1.2 g/dL      BUN/Creatinine Ratio 9.7     Anion Gap 9.0 mmol/L      eGFR 66.8 mL/min/1.73      Comment: National Kidney Foundation and American Society of Nephrology (ASN) Task Force recommended calculation based on the Chronic Kidney Disease Epidemiology Collaboration (CKD-EPI) equation refit without adjustment for race.       Narrative:      GFR Normal >60  Chronic Kidney " Disease <60  Kidney Failure <15      Phosphorus [[id number]]  (Abnormal) Collected: 08/04/22 2356    Specimen: Blood Updated: 08/05/22 0029     Phosphorus 2.2 mg/dL     Magnesium [[id number]]  (Normal) Collected: 08/04/22 2356    Specimen: Blood Updated: 08/05/22 0029     Magnesium 1.7 mg/dL     CBC & Differential [[id number]]  (Abnormal) Collected: 08/04/22 2356    Specimen: Blood Updated: 08/05/22 0015    Narrative:      The following orders were created for panel order CBC & Differential.  Procedure                               Abnormality         Status                     ---------                               -----------         ------                     CBC Auto Differential[[id number]]        Abnormal            Final result                 Please view results for these tests on the individual orders.    CBC Auto Differential [[id number]]  (Abnormal) Collected: 08/04/22 2356    Specimen: Blood Updated: 08/05/22 0015     WBC 5.97 10*3/mm3      RBC 4.27 10*6/mm3      Hemoglobin 11.3 g/dL      Hematocrit 34.0 %      MCV 79.6 fL      MCH 26.5 pg      MCHC 33.2 g/dL      RDW 13.9 %      RDW-SD 39.5 fl      MPV 11.8 fL      Platelets 138 10*3/mm3      Neutrophil % 69.2 %      Lymphocyte % 19.4 %      Monocyte % 7.9 %      Eosinophil % 2.8 %      Basophil % 0.5 %      Neutrophils, Absolute 4.13 10*3/mm3      Lymphocytes, Absolute 1.16 10*3/mm3      Monocytes, Absolute 0.47 10*3/mm3      Eosinophils, Absolute 0.17 10*3/mm3      Basophils, Absolute 0.03 10*3/mm3     Urinalysis, Microscopic Only - Urine, Clean Catch [[id number]]  (Abnormal) Collected: 08/04/22 1251    Specimen: Urine, Clean Catch Updated: 08/04/22 1446     RBC, UA 6-12 /HPF      WBC, UA 0-2 /HPF      Bacteria, UA None Seen /HPF      Squamous Epithelial Cells, UA 0-2 /HPF      Hyaline Casts, UA 0-2 /LPF      Methodology Automated Microscopy    Urinalysis With Microscopic If Indicated (No Culture) - Urine, Clean Catch [[id number]]  (Abnormal) Collected:  "08/04/22 1251    Specimen: Urine, Clean Catch Updated: 08/04/22 1446     Color, UA Yellow     Appearance, UA Clear     pH, UA 5.5     Specific Gravity, UA >=1.030     Glucose, UA Negative     Ketones, UA Negative     Bilirubin, UA Negative     Blood, UA Moderate (2+)     Protein, UA 30 mg/dL (1+)     Leuk Esterase, UA Negative     Nitrite, UA Negative     Urobilinogen, UA 1.0 E.U./dL    Tissue Pathology Exam [[id number]] Collected: 08/03/22 1354    Specimen: Tissue from Stomach Updated: 08/04/22 1249     Case Report --     Surgical Pathology Report                         Case: [id number]                                  Authorizing Provider:  Randal Calix Jr.,   Collected:           08/03/2022 01:54 PM                                 MD                                                                           Ordering Location:     [facility]  Received:            08/03/2022 02:48 PM                                 [facility] OR                                                                       Pathologist:           Keon Asif MD                                                         Specimen:    Stomach, PORTION OF STOMACH                                                                 Final Diagnosis --     1. Stomach, Partial Gastrectomy: Benign stomach with   A. Mild chronic gastritis.   B. No intestinal metaplasia.   C. No Helicobacter pylori.    kassie/jse        Gross Description --     1. Stomach.  Received in formalin labeled with the patient's name and designated \"portion of stomach\" the specimen consists of a rubbery pink-tan partially epithelialized portion of stomach measuring 23.2 x 4.7 x 3.5 cm. The serosal surface is smooth and glistening with a minimal amount of fatty yellow adipose. The surgical margin is closed by a series of metal staples. Opening the margin reveals approximately 10 cc of mucinous blood-tinged fluid with normal appearing pale tan folding rugae.  No mass or " polyp formation is grossly identified. Multiple representative sections of mucosa are submitted in cassettes 1A-1B with representative sections of surgical margin submitted in cassette 1C.    hbt/uso/kassie/kds               Assessment:      Doing well postoperatively.  Nausea vomiting resolved.  She is tolerating her diet..      Plan:   1. Cont Stage 1 diet  2. Continue with ambulation and Incentive spirometry  3. Plan for d/c home    Patient was seen and examined by Dr. Calix.    Hospital Course: The patient is a very pleasant 51 y.o. female that was admitted to the hospital with morbid obesity who underwent above procedure.  Postoperatively the patient was transferred to the bariatric unit per protocol.  Patient remained afebrile and hemodynamically stable.  Patient was up ambulating well and using incentive spirometer.  Postoperatively day 1 patient was started on stage I bariatric diet and continued with good ambulation.  She was having difficulty with p.o. Tylenol.  Postop day 2 patient doing much better tolerating her diet without any difficulty.  Lovenox was continued.  Patient was then discharged home.        Discharge medications:      Discharge Medications      New Medications      Instructions Start Date   Enoxaparin Sodium 40 MG/0.4ML solution prefilled syringe syringe  Commonly known as: LOVENOX   40 mg, Subcutaneous, Every 24 Hours Scheduled      Folbee 2.5-25-1 MG tablet tablet  Generic drug: folic acid-vit B6-vit B12   1 tablet, Oral, Daily      HYDROmorphone 2 MG tablet  Commonly known as: Dilaudid   Take 1 tablet by mouth Every 4 (Four) Hours As Needed for Severe Pain.      metoclopramide 10 MG tablet  Commonly known as: Reglan   10 mg, Oral, 4 Times Daily Before Meals & Nightly      ondansetron ODT 4 MG disintegrating tablet  Commonly known as: ZOFRAN-ODT   4 mg, Translingual, Every 4 Hours PRN         Continue These Medications      Instructions Start Date   albuterol sulfate  (90 Base)  MCG/ACT inhaler  Commonly known as: PROVENTIL HFA;VENTOLIN HFA;PROAIR HFA   2 puffs, Inhalation, Every 6 Hours PRN      albuterol 1.25 MG/3ML nebulizer solution  Commonly known as: ACCUNEB   1.25 mg, Nebulization, Every 6 Hours PRN      azelastine 0.1 % nasal spray  Commonly known as: ASTELIN   2 sprays, Nasal, Daily PRN      Calcium 500-100 MG-UNIT chewable tablet   3 tablets, Oral, Daily      fluticasone 50 MCG/ACT nasal spray  Commonly known as: FLONASE   1 spray, Nasal, As Needed      indapamide 2.5 MG tablet  Commonly known as: LOZOL   2.5 mg, Oral, Every Morning      lisinopril 5 MG tablet  Commonly known as: PRINIVIL,ZESTRIL   5 mg, Oral, Every Morning      loratadine 10 MG tablet  Commonly known as: CLARITIN   10 mg, Oral, Daily PRN      MAGNESIUM PO   1 tablet, Oral, Daily      multivitamin with minerals tablet tablet   1 tablet, Oral, Daily      vitamin B-12 100 MCG tablet  Commonly known as: CYANOCOBALAMIN   100 mcg, Oral, Daily, HOLD PRIOR TO SURGERY      vitamin C 250 MG tablet  Commonly known as: ASCORBIC ACID   250 mg, Oral, 2 Times Weekly         Stop These Medications    Chlorhexidine Gluconate Cloth 2 % pads            Discharge instructions:  Per Bariatric manual; per our protocol      Follow-up appointment: Follow up with Dr. Calix in the office as scheduled.  If not already scheduled call for appointment at [phone].      Electronically signed by Randal Calix Jr., MD at 08/05/22 1179

## 2022-08-05 NOTE — CASE MANAGEMENT/SOCIAL WORK
Case Management Discharge Note      Final Note: Home         Selected Continued Care - Admitted Since 8/3/2022     Destination    No services have been selected for the patient.              Durable Medical Equipment    No services have been selected for the patient.              Dialysis/Infusion    No services have been selected for the patient.              Home Medical Care    No services have been selected for the patient.              Therapy    No services have been selected for the patient.              Community Resources    No services have been selected for the patient.              Community & DME    No services have been selected for the patient.                       Final Discharge Disposition Code: 01 - home or self-care

## 2022-08-05 NOTE — DISCHARGE SUMMARY
"Discharge Summary    Patient name: Jessica Mcdowell    Medical record number: [id number]    Admission date: 8/3/2022  Discharge date:      Attending physician: Dr. Randal Calix    Primary care physician: Jun Aguillon MD    Referring physician: Randal Calix Jr., MD  [address] [address]    Condition on discharge: Stable    Primary Diagnoses:  Morbid obesity with co-morbidities    Operative Procedure: Laparoscopic sleeve gastrectomy     Jessica Mcdowell  is post op day two status post procedure listed. Patient denies shortness of air and lower extremity pain. Feels better than yesterday. No vomiting this am. Ambulating well and using incentive spirometer.          /94 (BP Location: Left arm, Patient Position: Lying)   Pulse 58   Temp 97.7 °F (36.5 °C) (Oral)   Resp 16   Ht 172.7 cm (68\")   Wt 126 kg (278 lb 7.1 oz)   LMP 01/08/2018 (Exact Date)   SpO2 91%   BMI 42.34 kg/m²     General:  alert, appears stated age and cooperative   Abdomen: soft, bowel sounds active, appropriate tenderness   Incision:   healing well, no drainage, no erythema, no hernia, no seroma, no swelling, no dehiscence, incision well approximated   Heart: Regular rate   Lungs: Clear to auscultation bilaterally     I reviewed the patient's new clinical results.     Lab Results (last 24 hours)     Procedure Component Value Units Date/Time    Comprehensive Metabolic Panel [[id number]]  (Abnormal) Collected: 08/04/22 7556    Specimen: Blood Updated: 08/05/22 0029     Glucose 92 mg/dL      BUN 10 mg/dL      Creatinine 1.03 mg/dL      Sodium 140 mmol/L      Potassium 3.5 mmol/L      Chloride 105 mmol/L      CO2 26.0 mmol/L      Calcium 8.7 mg/dL      Total Protein 6.3 g/dL      Albumin 3.40 g/dL      ALT (SGPT) 19 U/L      AST (SGOT) 25 U/L      Alkaline Phosphatase 68 U/L      Total Bilirubin 0.5 mg/dL      Globulin 2.9 gm/dL      A/G Ratio 1.2 g/dL      BUN/Creatinine Ratio 9.7     Anion Gap 9.0 mmol/L "      eGFR 66.8 mL/min/1.73      Comment: National Kidney Foundation and American Society of Nephrology (ASN) Task Force recommended calculation based on the Chronic Kidney Disease Epidemiology Collaboration (CKD-EPI) equation refit without adjustment for race.       Narrative:      GFR Normal >60  Chronic Kidney Disease <60  Kidney Failure <15      Phosphorus [[id number]]  (Abnormal) Collected: 08/04/22 2356    Specimen: Blood Updated: 08/05/22 0029     Phosphorus 2.2 mg/dL     Magnesium [[id number]]  (Normal) Collected: 08/04/22 2356    Specimen: Blood Updated: 08/05/22 0029     Magnesium 1.7 mg/dL     CBC & Differential [[id number]]  (Abnormal) Collected: 08/04/22 2356    Specimen: Blood Updated: 08/05/22 0015    Narrative:      The following orders were created for panel order CBC & Differential.  Procedure                               Abnormality         Status                     ---------                               -----------         ------                     CBC Auto Differential[[id number]]        Abnormal            Final result                 Please view results for these tests on the individual orders.    CBC Auto Differential [[id number]]  (Abnormal) Collected: 08/04/22 2356    Specimen: Blood Updated: 08/05/22 0015     WBC 5.97 10*3/mm3      RBC 4.27 10*6/mm3      Hemoglobin 11.3 g/dL      Hematocrit 34.0 %      MCV 79.6 fL      MCH 26.5 pg      MCHC 33.2 g/dL      RDW 13.9 %      RDW-SD 39.5 fl      MPV 11.8 fL      Platelets 138 10*3/mm3      Neutrophil % 69.2 %      Lymphocyte % 19.4 %      Monocyte % 7.9 %      Eosinophil % 2.8 %      Basophil % 0.5 %      Neutrophils, Absolute 4.13 10*3/mm3      Lymphocytes, Absolute 1.16 10*3/mm3      Monocytes, Absolute 0.47 10*3/mm3      Eosinophils, Absolute 0.17 10*3/mm3      Basophils, Absolute 0.03 10*3/mm3     Urinalysis, Microscopic Only - Urine, Clean Catch [[id number]]  (Abnormal) Collected: 08/04/22 1251    Specimen: Urine, Clean Catch Updated:  "08/04/22 1446     RBC, UA 6-12 /HPF      WBC, UA 0-2 /HPF      Bacteria, UA None Seen /HPF      Squamous Epithelial Cells, UA 0-2 /HPF      Hyaline Casts, UA 0-2 /LPF      Methodology Automated Microscopy    Urinalysis With Microscopic If Indicated (No Culture) - Urine, Clean Catch [[id number]]  (Abnormal) Collected: 08/04/22 1251    Specimen: Urine, Clean Catch Updated: 08/04/22 1446     Color, UA Yellow     Appearance, UA Clear     pH, UA 5.5     Specific Gravity, UA >=1.030     Glucose, UA Negative     Ketones, UA Negative     Bilirubin, UA Negative     Blood, UA Moderate (2+)     Protein, UA 30 mg/dL (1+)     Leuk Esterase, UA Negative     Nitrite, UA Negative     Urobilinogen, UA 1.0 E.U./dL    Tissue Pathology Exam [[id number]] Collected: 08/03/22 1354    Specimen: Tissue from Stomach Updated: 08/04/22 1249     Case Report --     Surgical Pathology Report                         Case: [id number]                                  Authorizing Provider:  Randal Calix Jr.,   Collected:           08/03/2022 01:54 PM                                 MD                                                                           Ordering Location:     [facility]  Received:            08/03/2022 02:48 PM                                 [facility] OR                                                                       Pathologist:           Keon Asif MD                                                         Specimen:    Stomach, PORTION OF STOMACH                                                                 Final Diagnosis --     1. Stomach, Partial Gastrectomy: Benign stomach with   A. Mild chronic gastritis.   B. No intestinal metaplasia.   C. No Helicobacter pylori.    kassie/jse        Gross Description --     1. Stomach.  Received in formalin labeled with the patient's name and designated \"portion of stomach\" the specimen consists of a rubbery pink-tan partially epithelialized portion of stomach " measuring 23.2 x 4.7 x 3.5 cm. The serosal surface is smooth and glistening with a minimal amount of fatty yellow adipose. The surgical margin is closed by a series of metal staples. Opening the margin reveals approximately 10 cc of mucinous blood-tinged fluid with normal appearing pale tan folding rugae.  No mass or polyp formation is grossly identified. Multiple representative sections of mucosa are submitted in cassettes 1A-1B with representative sections of surgical margin submitted in cassette 1C.    hbt/uso/kassie/kds               Assessment:      Doing well postoperatively.  Nausea vomiting resolved.  She is tolerating her diet..      Plan:   1. Cont Stage 1 diet  2. Continue with ambulation and Incentive spirometry  3. Plan for d/c home    Patient was seen and examined by Dr. Calix.    Hospital Course: The patient is a very pleasant 51 y.o. female that was admitted to the hospital with morbid obesity who underwent above procedure.  Postoperatively the patient was transferred to the bariatric unit per protocol.  Patient remained afebrile and hemodynamically stable.  Patient was up ambulating well and using incentive spirometer.  Postoperatively day 1 patient was started on stage I bariatric diet and continued with good ambulation.  She was having difficulty with p.o. Tylenol.  Postop day 2 patient doing much better tolerating her diet without any difficulty.  Lovenox was continued.  Patient was then discharged home.        Discharge medications:      Discharge Medications      New Medications      Instructions Start Date   Enoxaparin Sodium 40 MG/0.4ML solution prefilled syringe syringe  Commonly known as: LOVENOX   40 mg, Subcutaneous, Every 24 Hours Scheduled      Folbee 2.5-25-1 MG tablet tablet  Generic drug: folic acid-vit B6-vit B12   1 tablet, Oral, Daily      HYDROmorphone 2 MG tablet  Commonly known as: Dilaudid   Take 1 tablet by mouth Every 4 (Four) Hours As Needed for Severe Pain.       metoclopramide 10 MG tablet  Commonly known as: Reglan   10 mg, Oral, 4 Times Daily Before Meals & Nightly      ondansetron ODT 4 MG disintegrating tablet  Commonly known as: ZOFRAN-ODT   4 mg, Translingual, Every 4 Hours PRN         Continue These Medications      Instructions Start Date   albuterol sulfate  (90 Base) MCG/ACT inhaler  Commonly known as: PROVENTIL HFA;VENTOLIN HFA;PROAIR HFA   2 puffs, Inhalation, Every 6 Hours PRN      albuterol 1.25 MG/3ML nebulizer solution  Commonly known as: ACCUNEB   1.25 mg, Nebulization, Every 6 Hours PRN      azelastine 0.1 % nasal spray  Commonly known as: ASTELIN   2 sprays, Nasal, Daily PRN      Calcium 500-100 MG-UNIT chewable tablet   3 tablets, Oral, Daily      fluticasone 50 MCG/ACT nasal spray  Commonly known as: FLONASE   1 spray, Nasal, As Needed      indapamide 2.5 MG tablet  Commonly known as: LOZOL   2.5 mg, Oral, Every Morning      lisinopril 5 MG tablet  Commonly known as: PRINIVIL,ZESTRIL   5 mg, Oral, Every Morning      loratadine 10 MG tablet  Commonly known as: CLARITIN   10 mg, Oral, Daily PRN      MAGNESIUM PO   1 tablet, Oral, Daily      multivitamin with minerals tablet tablet   1 tablet, Oral, Daily      vitamin B-12 100 MCG tablet  Commonly known as: CYANOCOBALAMIN   100 mcg, Oral, Daily, HOLD PRIOR TO SURGERY      vitamin C 250 MG tablet  Commonly known as: ASCORBIC ACID   250 mg, Oral, 2 Times Weekly         Stop These Medications    Chlorhexidine Gluconate Cloth 2 % pads            Discharge instructions:  Per Bariatric manual; per our protocol      Follow-up appointment: Follow up with Dr. Calix in the office as scheduled.  If not already scheduled call for appointment at [phone].

## 2022-08-05 NOTE — PLAN OF CARE
Goal Outcome Evaluation:  Plan of Care Reviewed With: patient        Progress: improving  Outcome Evaluation: no emesis noted.  C/o abdominal pain.  Refused to take her tylenol,pt states she is unbale to keep it down.  IV dilaudid given.  lap site x 5 with dermabond KISHAN.  Ambulated in the squires multiple times.

## 2022-08-05 NOTE — DISCHARGE INSTRUCTIONS
GOING HOME AFTER GASTRIC SLEEVE/ GASTRIC BYPASS SURGERY  [facility] Weight Loss: Post-Operative Information/Instructions  Randal Calix Jr., MD  General Patient Instructions for Discharge   - Call Surgeon's office at [phone] for follow-up appointment.    - Be sure you, the patient, have a follow-up appointment to be seen within seven (7) days after discharge. If not, please call [phone] to schedule an appointment. If you are discharged on a Saturday or Sunday, please call Monday to schedule the appointment.  - Contact the Surgeon at [phone] for any questions or concerns, including temperature greater than or equal to 101F, shortness of breath, leg swelling, redness at incision sites, nausea, vomiting, chills, or problems or questions.    - Follow the Gastric Stage 1 Diet    à Clear liquids, room temperature, sugar-free, caffeine-free, non-carbonated, 70 grams of protein, No Straws.  - You may shower. No tub bath for 2 weeks.  - No lifting, pushing, pulling, or tugging >25 pounds for 3 weeks.  - Ambulate every 3 hours while awake minimum for seven (7) days, increase distance daily.  - For the next several weeks, you are at an increased risk for blood clot formation. Therefore, you should walk regularly. You should not sit for prolonged periods of time, more than 45 minutes, without getting up and walking for 5-10 minutes. This includes any car rides, including the drive home from the hospital. If driving any distance greater than 30 miles over the next two (2) weeks, stop every 30-45 minutes and walk for 5-10 minutes each time.  - Continue using Incentive Spirometer and coughing exercises at least every two (2) hours while awake for one week.  - Continue use of CPAP/BIPAP for diagnosis of sleep apnea as directed.  - No driving or operating machinery allowed while taking narcotic (prescription) pain medication, and until you feel comfortable forcefully applying the brakes if needed. (This  usually takes more than 3 days.)    - Make an appointment with your Primary Care Physician within one week post-op to look at your home medications for possible changes or discontinuity.   Medications  - The nurse will provide a list of medications for you to continue at home   - If you received a Lovenox (Enoxaparin) or Apixiban (Eliquis) prescription at pre-op visit with Surgeon, start taking the medicine the morning after discharge unless directed otherwise.    - If you were prescribed Lovenox (Enoxaparin), review the education/teaching material/video with the nurse.    - Take post op pain meds as prescribed as needed.   - Continue Foltx until finished.   - Resume use of Actigall (Ursodiol) one (1) week after surgery if patient still has gallbladder. You should have been given a prescription at your pre-op visit. Contact the office if you do not have the prescription.   - Resume bariatric vitamin regimen as instructed in pre-op education with bariatric coordinator.    - Zegerid or Prilosec OTC (or generic) by mouth once daily for four (4) weeks unless you are already taking a proton pump inhibitor as home medication. Follow dosing instructions on package.   Nausea/Vomiting:  The following are possible causes for nausea/vomiting:  - Drinking too much or too fast.  - Sinus drainage/post nasal drip for allergy sufferers (you may take Sudafed, Claritin, Tylenol Sinus/Allergy, or other decongestants and nose sprays to help with this discomfort).  - Low blood sugar (sweating, shaky, irritable, weakness, dizzy or tunnel-vision) - treatment is to sip 100% fruit juice - no sugar added until symptoms subside.  - Acid in fruit juice - (may dilute with water or avoid).  - Eating or drinking something that is not on clear liquid (stage 1) diet.  Any nausea/vomiting that prohibits you from keeping fluids down for greater than 24 hours requires a call to the surgeon's office.  Urine:  Use your urine color as a guide to  determine if you are drinking enough fluid. The darker the urine, the more fluids you need to drink. Urine should be clear to light yellow if you are getting enough fluid. If you should experience frequency, burning or pain with urination, blood in urine, contact us or your primary care physician for possible UTI (urinary tract infection), which could require antibiotics (liquid preferred).  Bowel Movements:  You may not have a bowel movement for 2-5 days after going home. You may then experience liquid, runny or loose stools for approximately 3-4 weeks following surgery. This would require you to drink even more fluids to prevent dehydration. Some patients may experience constipation, which can be treated with increased fluids, drinking warm liquids, increased activity and the use of a Fleets Enema, Milk of Magnesia, or suppositories. The first couple of bowel movements could be bloody, tarry black or dark maroon in color. This is OK as long as the stool returns to a normal color in 1-2 days. If however, you have frequent or a large amount of bloody or tarry black stools and/or become light-headed or dizzy, you may be bleeding and require urgent attention. Please call us right away.  Abdominal Incisions:  You will have small incisions. Do not scrub incisions, but allow the warm, soapy water to run over the incisions, rinse well, and pat dry. You may use any brand of anti-bacterial soap. Do not use Peroxide or Neosporin type ointments on sites, unless instructed to do so by a surgeon or nurse. Monitor daily for signs/symptoms of infection, which might include: drainage with a foul odor, pain, redness, swelling or heat at the incision sight; fever, body aches and chills. If you suspect infection or have a fever, give us a call.  Pain:  You will be given a prescription for pain medication to control your pain. If you feel the dose is too strong, you may take half the ordered dose, or you may take Tylenol adult liquid  per package instructions for minor pain. Do not take any medications that contain aspirin or aspirin products.  Do not take medications like: Motrin, Aleve, Ibuprofen, Advil, Naproxen, Celebrex, Daypro, Bextra, Meloxicam or other medications commonly used for arthritis or joint pain.  No steroids or cortisone injections. There may be pain, which should improve every few days. Pain should not suddenly get worse or more intense. Pain that suddenly changes and is constant and severe should be called in to the surgeon's office. Any sudden pain in the lower extremities with associated warmth and redness should be called in to the surgeon's office immediately. Do not rub or massage this area, as it could be a blood clot.  Diet:  Remain on the clear liquid diet (stage 1) per your  which includes 70 grams of protein each day, sugar free, non carbonated and no straws. Day 1 is the day of surgery. If you are tolerating the stage 1 diet, you may then proceed to stage 2 diet, as instructed in the . Do not progress to the stage 2 diet if you are having nausea/vomiting. Refer to the Basic Nutrition and Food Principles guide.  Medications:  The nurse will let you know which medications you will need to continue once you go home. Do not take any medications that are extended or time released if you had the gastric bypass procedure, OK to take if you had the gastric sleeve procedure. Large capsules can be opened and diluted with clear liquids. Check with your physician or pharmacist as to which pills may be crushed and which capsules may be opened and diluted safely. Continue taking Foltx as surgeon orders. If you still have your gall bladder and were prescribed Actigall (Ursodiol), you may resume this medication one week after your surgery. You will remain on Actigall (Ursodiol) for approximately 6 months. The dose is 1 pill, 2 times each day for 6 months.  Activity:  Continue your deep breathing and  coughing exercises with your Incentive Spirometer breathing device at least every 3 hours while awake (10 repetitions each time) for one week. May use CPAP. This will help to prevent respiratory problems such as pneumonia. No lifting, pulling or tugging anything over 25 pounds for 3 weeks after surgery. You may shower but no tub baths, hot tubs or swimming for 2 weeks. Moderate walking is recommended every 3 hours while awake minimum, increase distance daily. Further exercise will be discussed at the first post-op visit. No driving or operating machinery allow until off narcotic pain medication and until you feel comfortable forcefully applying the brakes (usually takes 3 or more days). For the next few weeks you are at an increased risk for blood clot formation. Therefore you should walk regularly and you should not sit for prolonged periods of time, more than 45 minutes without getting up and walking for 5-10 minutes. This includes car rides. Including riding home from the hospital. If riding a distance greater than 30 miles over the next 2 weeks stop every 30-45 minutes and walk 5-10 mintues each time. No tanning bed use for 8 weeks after surgery and in general, not recommended due to the increased risk for skin cancer. Incisions will burn/blister very badly with tanning bed use.  Illness:  Your primary care physician should treat general illness such as ear infections, sinus infections, and viral type illnesses, etc. Medications prescribed should be liquid/elixir form when possible, for the first 30 days.  General:  In general, it is recommended that you weigh yourself no more than once per week. Let the weight come off you and concentrate on more important things. Remember the weight was not gained overnight, nor will it be lost overnight. Gastric Bypass/ Gastric Sleeve weight loss will continue over a period of 12-18 months. Do not  yourself according to how others are doing after surgery, as this will  cause unnecessary discouragement.  THE ABOVE ARE GENERAL GUIDELINES TO ASSIST YOU ONCE HOME, IF YOU ARE IN DOUBT, OR YOU HAVE ANY QUESTIONS, CALL US AT THE NUMBERS LISTED BELOW.  IN THE EVENT OF SUDDEN CHEST PAIN, SHORTNESS OF BREATH, OR ANY LIFE THREATENING CONDITION, CALL 911.  Any time you are evaluated or admitted to another facility, please have someone notify the surgeon's office.  Supplements:  70 grams of protein taken EVERY DAY. Remember to drink at least 64 ounces of fluid a day, sipping slowly early on. Increase this amount during the summertime. Sipping slowly will not stretch your new stomach. Drinking too fast or gulping liquids will cause brief discomfort and early could cause staple line disruption (leak). With eating, tiny bites, then chew, chew, chew, and swallow. Lay your fork/spoon down for 2-3 minutes, and then take your next bite. Your pouch will tell you within 1-2 bites if it is going to tolerate what you are eating.   Protein Vendors:  Refer to protein vendors' handout from consult class. You can always find protein drinks at the bariatric office, grocery stores, [employer], drug [employer], [employer], health food stores, and on the Internet. Find one high in protein (15-30 grams per serving) and low carb (less than 18 grams per serving).  Now is a great time to re-read your . Please review specific instructions given to you at discharge by your physician (surgeon).  HOW/WHEN TO CONTACT US:  It is imperative that you contact us with any of the following:    Ÿ fever greater than 101 degrees  Ÿ shortness of breath  Ÿ leg swelling  Ÿ body aches  Ÿ shaking chills  Ÿ nausea and vomitting  Ÿ pain that has worsened  Ÿ redness at incision sites  Ÿ pus or foul smelling drainage from an incision or wound  Ÿ inability to keep fluids down for more than a day  Ÿ any other condition you feel needs our attention.  [facility] - Bariatric: [phone] call this number anytime 24 hours a  day / 7 days a week.  Teach-back Questions to be answered by the patient prior to discharge.   What complications would prompt you to call your doctor when you return home? _________________    What is the purpose of your prescribed medication? ________________  What are some potential side effects of the medications you will be taking at home? _______________

## 2022-08-06 NOTE — OUTREACH NOTE
Prep Survey    Flowsheet Row Responses   [facility] facility patient discharged from? [facility]   Is LACE score < 7 ? No   Emergency Room discharge w/ pulse ox? No   Eligibility Readm Mgmt   Discharge diagnosis s/p Laparoscopic sleeve gastrectomy   Does the patient have one of the following disease processes/diagnoses(primary or secondary)? General Surgery   Does the patient have Home health ordered? No   Is there a DME ordered? No   Prep survey completed? Yes          SUKHWINDER CRUZ - Registered Nurse

## 2022-08-07 ENCOUNTER — APPOINTMENT (OUTPATIENT)
Dept: GENERAL RADIOLOGY | Facility: HOSPITAL | Age: 52
End: 2022-08-07

## 2022-08-07 ENCOUNTER — HOSPITAL ENCOUNTER (EMERGENCY)
Facility: HOSPITAL | Age: 52
Discharge: HOME | End: 2022-08-07
Attending: EMERGENCY MEDICINE | Admitting: EMERGENCY MEDICINE

## 2022-08-07 ENCOUNTER — APPOINTMENT (OUTPATIENT)
Dept: CARDIOLOGY | Facility: HOSPITAL | Age: 52
End: 2022-08-07

## 2022-08-07 VITALS
SYSTOLIC BLOOD PRESSURE: 155 MMHG | TEMPERATURE: 98.4 F | DIASTOLIC BLOOD PRESSURE: 109 MMHG | RESPIRATION RATE: 16 BRPM | HEART RATE: 101 BPM | OXYGEN SATURATION: 98 %

## 2022-08-07 DIAGNOSIS — M79.18 MUSCULOSKELETAL PAIN: Primary | ICD-10-CM

## 2022-08-07 DIAGNOSIS — I82.612 SUPERFICIAL VENOUS THROMBOSIS OF ARM, LEFT: ICD-10-CM

## 2022-08-07 PROCEDURE — 99283 EMERGENCY DEPT VISIT LOW MDM: CPT

## 2022-08-07 PROCEDURE — 93971 EXTREMITY STUDY: CPT

## 2022-08-07 PROCEDURE — 73030 X-RAY EXAM OF SHOULDER: CPT

## 2022-08-07 PROCEDURE — 72050 X-RAY EXAM NECK SPINE 4/5VWS: CPT

## 2022-08-07 RX ORDER — CYCLOBENZAPRINE HCL 5 MG
5 TABLET ORAL 3 TIMES DAILY PRN
Qty: 15 TABLET | Refills: 0 | Status: SHIPPED | OUTPATIENT
Start: 2022-08-07

## 2022-08-07 RX ORDER — LIDOCAINE 50 MG/G
1 PATCH TOPICAL ONCE
Status: DISCONTINUED | OUTPATIENT
Start: 2022-08-07 | End: 2022-08-07 | Stop reason: HOSPADM

## 2022-08-07 RX ORDER — CYCLOBENZAPRINE HCL 10 MG
5 TABLET ORAL ONCE
Status: COMPLETED | OUTPATIENT
Start: 2022-08-07 | End: 2022-08-07

## 2022-08-07 RX ORDER — LIDOCAINE 50 MG/G
1 PATCH TOPICAL EVERY 24 HOURS
Qty: 30 PATCH | Refills: 0 | Status: SHIPPED | OUTPATIENT
Start: 2022-08-07

## 2022-08-07 RX ORDER — HYDROCODONE BITARTRATE AND ACETAMINOPHEN 5; 325 MG/1; MG/1
1 TABLET ORAL ONCE
Status: DISCONTINUED | OUTPATIENT
Start: 2022-08-07 | End: 2022-08-07 | Stop reason: HOSPADM

## 2022-08-07 RX ADMIN — CYCLOBENZAPRINE HYDROCHLORIDE 5 MG: 10 TABLET, FILM COATED ORAL at 17:39

## 2022-08-07 RX ADMIN — LIDOCAINE 1 PATCH: 50 PATCH CUTANEOUS at 17:42

## 2022-08-07 NOTE — ED TRIAGE NOTES
Pt reports left shoulder pain that started on Friday. Pt denies Injury.     Pt was wearing a mask during assessment.  This RN wore appropriate PPE

## 2022-08-07 NOTE — PROGRESS NOTES
Patient sent from the ER for a stat left upper extremity venous duplex. Scan completed and preliminary results of negative for DVT with chronic SVT in left arm given to Dr. Leonid Harris in the ER.

## 2022-08-07 NOTE — ED PROVIDER NOTES
EMERGENCY DEPARTMENT ENCOUNTER  I wore full protective equipment throughout this patient encounter including a N95 mask, eye shield, gown and gloves. Hand hygiene was performed before donning protective equipment and after removal when leaving the room.    Room Number:  37/37  Date of encounter:  8/7/2022  PCP: Jun Aguillon MD    HPI:  Context: Jessica Mcdowell is a 51 y.o. female who presents to the ED c/o chief complaint of left shoulder pain.  Patient complains of pain since Thursday.  Patient denies any falls or trauma.  Patient complains of well localized sharp stabbing pain in her left trapezius, pain is constant, sometimes worsens.  Patient denies any chest pain or shortness of breath, no arm swelling, no weakness or numbness.  Patient reports concern for possible DVT.  Patient denies any history of DVT.    MEDICAL HISTORY REVIEW  Reviewed in EPIC    PAST MEDICAL HISTORY  Active Ambulatory Problems     Diagnosis Date Noted   • Iron deficiency anemia secondary to inadequate dietary iron intake 05/22/2012   • HTN (hypertension) 12/29/2020   • Seasonal allergies 02/16/2022   • Chronic fatigue 02/16/2022   • Heart murmur 02/16/2022   • Asthma 02/16/2022   • IBS (irritable bowel syndrome) 02/16/2022   • Hemorrhoids 02/16/2022   • History of kidney stones 02/16/2022   • Chronic pain of both knees 02/16/2022   • Rheumatoid arthritis (HCC) 02/16/2022   • Gout 02/16/2022   • Prediabetes 02/16/2022   • History of heart attack 02/16/2022   • Obesity, Class III, BMI 40-49.9 (morbid obesity) (HCC) 02/16/2022   • Localized edema 02/16/2022   • Dietary counseling 02/16/2022   • Left leg swelling 04/17/2022   • Hiatal hernia 08/03/2022     Resolved Ambulatory Problems     Diagnosis Date Noted   • Abnormal uterine bleeding 01/15/2018   • Weight gain 02/16/2022   • Preoperative testing 02/16/2022     Past Medical History:   Diagnosis Date   • Eczema    • History of anemia    • History of gout    • History of hemorrhoids     • Hypertension    • Kidney stone    • Leiomyoma    • Murmur, heart    • PONV (postoperative nausea and vomiting)        PAST SURGICAL HISTORY  Past Surgical History:   Procedure Laterality Date   • ABDOMINOPLASTY     •  SECTION      x2   • COLONOSCOPY     • ENDOSCOPY     • GASTRIC SLEEVE LAPAROSCOPIC N/A 8/3/2022    Procedure: GASTRIC SLEEVE LAPAROSCOPIC OR ROBOTIC, HIATAL HERNIA REPAIR;  Surgeon: Randal Calix Jr., MD;  Location: [facility];  Service: Robotics - DaVinci;  Laterality: N/A;   • HEMORRHOIDECTOMY     • HYSTERECTOMY SUPRACERVICAL Bilateral 01/15/2018    Procedure: LAPAROSCOPIC SUPRACERVICAL HYSTERECTOMY WITH DAVINCI ROBOT BILATERAL SALPINGECTOMY;  Surgeon: Brigitte Roldan MD;  Location: [facility] OR;  Service:    • LIPOMA EXCISION      back   • WISDOM TOOTH EXTRACTION      THREE       FAMILY HISTORY  Family History   Adopted: Yes   Problem Relation Age of Onset   • Malig Hyperthermia Neg Hx        SOCIAL HISTORY  Social History     Socioeconomic History   • Marital status: Single   Tobacco Use   • Smoking status: Never Smoker   • Smokeless tobacco: Never Used   Vaping Use   • Vaping Use: Never used   Substance and Sexual Activity   • Alcohol use: Yes     Comment: MONTHLY 1 DRINKS   • Drug use: Never   • Sexual activity: Defer     Birth control/protection: Condom, Surgical       ALLERGIES  Iodine and Shellfish-derived products    The patient's allergies have been reviewed    REVIEW OF SYSTEMS  All systems reviewed and negative except for those discussed in HPI.     PHYSICAL EXAM  I have reviewed the triage vital signs and nursing notes.  ED Triage Vitals [22 1620]   Temp Heart Rate Resp BP SpO2   98.4 °F (36.9 °C) 101 16 -- 95 %      Temp src Heart Rate Source Patient Position BP Location FiO2 (%)   Tympanic Monitor -- -- --       General: No acute distress.  HENT: NCAT, PERRL, Nares patent.  Eyes: no scleral icterus.  Neck: trachea midline, no ROM limitations.  Cervical spine:  No step-offs or deformities, no midline tenderness, no paraspinal tenderness.  Patient has trigger points in left upper trapezius.  CV: regular rhythm, regular rate.  Respiratory: normal effort, CTAB.  Abdomen: soft, nondistended, NTTP, no rebound tenderness, no guarding or rigidity.  Musculoskeletal: no deformity.  Shoulder is normal in appearance, no crepitus or deformity.  Shoulder has full range of motion, neurovascular intact distally.  Neuro: alert, moves all extremities, follows commands.  Skin: warm, dry.  Left upper extremity is normal in appearance, no redness warmth or swelling.  No palpable cords.    LAB RESULTS  Recent Results (from the past 24 hour(s))   Duplex Venous Upper Extremity - Left CAR    Collection Time: 08/07/22  7:19 PM   Result Value Ref Range    Target HR (85%) 145 bpm    Max. Pred. HR (100%) 171 bpm    Right Internal Jugular Augment Y     Right Internal Jugular Compress C     Right Internal Jugular Phasic Y     Right Internal Jugular Spont Y     Left Internal Jugular Augment Y     Left Internal Jugular Compress C     Left Internal Jugular Phasic Y     Left Internal Jugular Spont Y     Right Subclavian Augment Y     Right Subclavian Compress C     Right Subclavian Phasic Y     Right Subclavian Spont Y     Left Subclavian Augment Y     Left Subclavian Compress C     Left Subclavian Phasic Y     Left Subclavian Spont Y     Left Axillary Augment Y     Left Axillary Compress C     Left Axillary Phasic Y     Left Axillary Spont Y     Left Brachial Compress C     Left Radial Compress C     Left Ulnar Compress C     Left Basilic Upper Compress C     Left Basilic Forearm Compress C     Left Cephalic Upper Compress C     Left Cephalic Forearm Compress N     Left Cephalic Forearm Thrombus C     Left Cephalic Forearm Color 1        I ordered the above labs and reviewed the results.    RADIOLOGY  XR Shoulder 2+ View Left, XR Spine Cervical Complete 4 or 5 View    Result Date: 8/7/2022  CERVICAL SPINE  AND LEFT SHOULDER X-RAYS  HISTORY: Pain.  TECHNIQUE: Seven x-rays of the cervical spine two x-rays of the left shoulder are provided.  FINDINGS: There is degenerative change at the left acromioclavicular joint. Humeral head is normally located. No fracture or bone lesion present around the shoulder the visualized left upper hemithorax.  Cervical spine images show mild disc narrowing at C3-4 and C4-5. Vertebral height and alignment are normal. Prevertebral soft tissue thickness is normal. No significant osseous foraminal stenosis. Prevertebral soft tissue thickness is normal. Prominent left C7 cervical rib.      Degenerative disc change in the upper cervical spine and degenerative change at the left acromial clavicular joint. No acute abnormality. Prominent C7 cervical rib.  This report was finalized on 8/7/2022 5:08 PM by Dr. Randal Mccormick M.D.        I ordered the above noted radiological studies. I reviewed the images and results. I agree with the radiologist interpretation.    PROCEDURES  Procedures    MEDICATIONS GIVEN IN ER  Medications   HYDROcodone-acetaminophen (NORCO) 5-325 MG per tablet 1 tablet (1 tablet Oral Not Given 8/7/22 1740)   lidocaine (LIDODERM) 5 % 1 patch (1 patch Transdermal Medication Applied 8/7/22 1742)   cyclobenzaprine (FLEXERIL) tablet 5 mg (5 mg Oral Given 8/7/22 1739)       PROGRESS, DATA ANALYSIS, CONSULTS, AND MEDICAL DECISION MAKING  A complete history and physical exam have been performed.  All available laboratory and imaging results have been reviewed by myself prior to disposition.    MDM  After the initial H&P, I discussed pertinent information from history and physical exam with patient/family.  Discussed differential diagnosis.  Discussed plan for ED evaluation/workup/treatment.  All questions answered.  Patient/family is agreeable with plan.  ED Course as of 08/07/22 1959   Sun Aug 07, 2022   1634 Pain seems localized to the trapezius, I have low suspicion for other  pathology, pain seems likely musculoskeletal brought on by recent surgery/hospitalization.  Patient reports concern for bony pathology as well as possible blood clot.  I had extensive discussion with patient regarding low suspicion for above but patient was insistent upon imaging, will obtain cervical as well as shoulder x-ray imaging as well as left upper extremity ultrasound to rule out DVT. [JG]   1920 Preliminary report per ultrasound is left upper extremity is negative for DVT, patient does have chronic SVT. [JG]   1957 Patient reassessed, discussed ED work-up and results.  Discussed finding of superficial venous thrombosis.  Patient reports that she is currently supposed to be taking Lovenox, will not use any other treatment at this time, avoiding NSAIDs given recent bariatric surgery.  Discussed plan for treatment of musculoskeletal trapezius pain with numbing patches and muscle relaxers.  Discussed need for close follow-up with primary care physician, extensive discussion return precautions, discharging. [JG]      ED Course User Index  [JG] Leonid Harris MD       AS OF 19:59 EDT VITALS:    BP - (!) 155/109  HR - 101  TEMP - 98.4 °F (36.9 °C) (Tympanic)  O2 SATS - 98%    DIAGNOSIS  Final diagnoses:   Musculoskeletal pain   Superficial venous thrombosis of arm, left         DISPOSITION  DISCHARGE    Patient discharged in stable condition.    Reviewed implications of results, diagnosis, meds, responsibility to follow up, warning signs and symptoms of possible worsening, potential complications and reasons to return to ER.    Patient/Family voiced understanding of above instructions.    Discussed plan for discharge, as there is no emergent indication for admission. Patient referred to primary care provider for BP management due to today's BP. Pt/family is agreeable and understands need for follow up and repeat testing.  Pt is aware that discharge does not mean that nothing is wrong but it indicates no  emergency is present that requires admission and they must continue care with follow-up as given below or physician of their choice.     FOLLOW-UP  Jun Aguillon MD  [address] [id number] [phone]    Schedule an appointment as soon as possible for a visit in 2 days  even if well         Medication List      New Prescriptions    cyclobenzaprine 5 MG tablet  Commonly known as: FLEXERIL  Take 1 tablet by mouth 3 (Three) Times a Day As Needed for Muscle Spasms.     lidocaine 5 %  Commonly known as: Lidoderm  Place 1 patch on the skin as directed by provider Daily. Remove & Discard patch within 12 hours or as directed by MD           Where to Get Your Medications      These medications were sent to [id number] - [place], [place] - [address] [facility] AT [facility] [phone]  - [phone]   [address] [facility] [address]    Phone: [phone]   · cyclobenzaprine 5 MG tablet  · lidocaine 5 %          Leonid Harris MD  08/07/22 2000

## 2022-08-08 ENCOUNTER — READMISSION MANAGEMENT (OUTPATIENT)
Dept: CALL CENTER | Facility: HOSPITAL | Age: 52
End: 2022-08-08

## 2022-08-08 LAB
BH CV UPPER VENOUS LEFT AXILLARY AUGMENT: NORMAL
BH CV UPPER VENOUS LEFT AXILLARY COMPRESS: NORMAL
BH CV UPPER VENOUS LEFT AXILLARY PHASIC: NORMAL
BH CV UPPER VENOUS LEFT AXILLARY SPONT: NORMAL
BH CV UPPER VENOUS LEFT BASILIC FOREARM COMPRESS: NORMAL
BH CV UPPER VENOUS LEFT BASILIC UPPER COMPRESS: NORMAL
BH CV UPPER VENOUS LEFT BRACHIAL COMPRESS: NORMAL
BH CV UPPER VENOUS LEFT CEPHALIC FOREARM COLOR: 1
BH CV UPPER VENOUS LEFT CEPHALIC FOREARM COMPRESS: NORMAL
BH CV UPPER VENOUS LEFT CEPHALIC FOREARM THROMBUS: NORMAL
BH CV UPPER VENOUS LEFT CEPHALIC UPPER COMPRESS: NORMAL
BH CV UPPER VENOUS LEFT INTERNAL JUGULAR AUGMENT: NORMAL
BH CV UPPER VENOUS LEFT INTERNAL JUGULAR COMPRESS: NORMAL
BH CV UPPER VENOUS LEFT INTERNAL JUGULAR PHASIC: NORMAL
BH CV UPPER VENOUS LEFT INTERNAL JUGULAR SPONT: NORMAL
BH CV UPPER VENOUS LEFT RADIAL COMPRESS: NORMAL
BH CV UPPER VENOUS LEFT SUBCLAVIAN AUGMENT: NORMAL
BH CV UPPER VENOUS LEFT SUBCLAVIAN COMPRESS: NORMAL
BH CV UPPER VENOUS LEFT SUBCLAVIAN PHASIC: NORMAL
BH CV UPPER VENOUS LEFT SUBCLAVIAN SPONT: NORMAL
BH CV UPPER VENOUS LEFT ULNAR COMPRESS: NORMAL
BH CV UPPER VENOUS RIGHT INTERNAL JUGULAR AUGMENT: NORMAL
BH CV UPPER VENOUS RIGHT INTERNAL JUGULAR COMPRESS: NORMAL
BH CV UPPER VENOUS RIGHT INTERNAL JUGULAR PHASIC: NORMAL
BH CV UPPER VENOUS RIGHT INTERNAL JUGULAR SPONT: NORMAL
BH CV UPPER VENOUS RIGHT SUBCLAVIAN AUGMENT: NORMAL
BH CV UPPER VENOUS RIGHT SUBCLAVIAN COMPRESS: NORMAL
BH CV UPPER VENOUS RIGHT SUBCLAVIAN PHASIC: NORMAL
BH CV UPPER VENOUS RIGHT SUBCLAVIAN SPONT: NORMAL
MAXIMAL PREDICTED HEART RATE: 171 BPM
STRESS TARGET HR: 145 BPM

## 2022-08-08 NOTE — OUTREACH NOTE
General Surgery Week 1 Survey    Flowsheet Row Responses   [facility] patient discharged from? [facility]   Does the patient have one of the following disease processes/diagnoses(primary or secondary)? General Surgery   Week 1 attempt successful? Yes   Call start time 0915   Call end time 0916   Discharge diagnosis s/p Laparoscopic sleeve gastrectomy   Is patient permission given to speak with other caregiver? Yes   List who call center can speak with Jose De Jesus son    Person spoke with today (if not patient) and relationship Ambrosiogaby son    Psychosocial issues? No   What is the patient's perception of their health status since discharge? Improving   Is the patient/caregiver able to teach back signs and symptoms of incisional infection? Increased redness, swelling or pain at the incisonal site, Increased drainage or bleeding, Pus or odor from incision   Week 1 call completed? Yes   Wrap up additional comments call brief - pt's son able to answer above questions           ISABELL H - Registered Nurse

## 2022-08-10 ENCOUNTER — OFFICE VISIT (OUTPATIENT)
Dept: BARIATRICS/WEIGHT MGMT | Facility: CLINIC | Age: 52
End: 2022-08-10

## 2022-08-10 VITALS
HEART RATE: 74 BPM | TEMPERATURE: 97.1 F | DIASTOLIC BLOOD PRESSURE: 108 MMHG | HEIGHT: 68 IN | BODY MASS INDEX: 39.71 KG/M2 | RESPIRATION RATE: 18 BRPM | WEIGHT: 262 LBS | SYSTOLIC BLOOD PRESSURE: 169 MMHG

## 2022-08-10 DIAGNOSIS — E66.9 OBESITY, CLASS II, BMI 35-39.9: Primary | ICD-10-CM

## 2022-08-10 DIAGNOSIS — R73.03 PREDIABETES: ICD-10-CM

## 2022-08-10 DIAGNOSIS — Z98.84 S/P LAPAROSCOPIC SLEEVE GASTRECTOMY: ICD-10-CM

## 2022-08-10 DIAGNOSIS — I10 PRIMARY HYPERTENSION: ICD-10-CM

## 2022-08-10 PROBLEM — E66.812 OBESITY, CLASS II, BMI 35-39.9: Status: ACTIVE | Noted: 2022-08-10

## 2022-08-10 PROBLEM — E66.813 OBESITY, CLASS III, BMI 40-49.9 (MORBID OBESITY): Status: RESOLVED | Noted: 2022-02-16 | Resolved: 2022-08-10

## 2022-08-10 PROBLEM — E66.01 OBESITY, CLASS III, BMI 40-49.9 (MORBID OBESITY) (HCC): Status: RESOLVED | Noted: 2022-02-16 | Resolved: 2022-08-10

## 2022-08-10 PROCEDURE — 99024 POSTOP FOLLOW-UP VISIT: CPT | Performed by: NURSE PRACTITIONER

## 2022-08-10 NOTE — PROGRESS NOTES
[facility]ATR[facility] BARIATRIC SURGERY  [address] [facility] [address]  [facility] [address]  [phone]  [address] [id number] [phone]  Dept: [phone]  8/10/2022      Jessica Mcdowell.  [id number]  [id number]  1970  female      Chief Complaint   Patient presents with   • Post-op     1 week post op sleeve       BH Post-Op Bariatric Surgery:   Jessica Mcdowell is status post laparopscopic Laparoscopic Sleeve/HH procedure, performed on 8/3/22.     HPI:   Today's weight is 119 kg (262 lb) pounds, today's BMI is Body mass index is 39.71 kg/m².,@ has a  loss of 13 pounds since the last visit and@ weight loss since surgery is 13 pounds. The patient reports a decreased portion size and loss of appetite.  Jessica Mcdowell denies nausea, vomiting, reflux and reports. The patient c/o appropriate post-op incisional discomfort that is improving. she is doing well with protein and water intake so far. Taking their vitamins, walking and using IS. Denies fevers, chills, chest pain or shortness of air.     She reports that her blood pressure hasn't been well controlled. She reports that Friday she was noticing pain in her left shoulder and joint space. She reports sharp, burning, type nerve pain. She is getting at least 50oz of fluid in. She has not had a bowel movement yet, she does feel that she needs to have one.      Diet and Exercise: Diet history reviewed and discussed with the patient. Weight loss/gains to date discussed with the patient. No carbonated beverage consumption and exercising regularly- walking frequently.   Supplements: multivitamins, B-12, calcium, iron, B-1 and Vitamin D.   Patient is taking blood thinner as prescribed: lovenox  Current outpatient and discharge medications have been reconciled for the patient.  Reviewed by: AARTI Marr      Review of Systems   Constitutional: Positive for fatigue. Negative for appetite change,  fever and unexpected weight change.   HENT: Negative.    Eyes: Negative.    Respiratory: Negative.    Cardiovascular: Negative.  Negative for leg swelling.   Gastrointestinal: Positive for abdominal pain. Negative for abdominal distention, constipation, diarrhea, nausea and vomiting.   Genitourinary: Negative for difficulty urinating, frequency and urgency.   Musculoskeletal: Negative for back pain.   Skin: Positive for wound.   Psychiatric/Behavioral: Negative.    All other systems reviewed and are negative.      Patient Active Problem List   Diagnosis   • Iron deficiency anemia secondary to inadequate dietary iron intake   • HTN (hypertension)   • Seasonal allergies   • Chronic fatigue   • Heart murmur   • Asthma   • IBS (irritable bowel syndrome)   • Hemorrhoids   • History of kidney stones   • Chronic pain of both knees   • Rheumatoid arthritis (HCC)   • Gout   • Prediabetes   • History of heart attack   • Localized edema   • Dietary counseling   • Left leg swelling   • Hiatal hernia   • Obesity, Class II, BMI 35-39.9   • S/P laparoscopic sleeve gastrectomy       The following portions of the patient's history were reviewed and updated as appropriate: allergies, current medications, past family history, past medical history, past social history, past surgical history and problem list.    Vitals:    08/10/22 1114   BP: (!) 169/108   Pulse: 74   Resp: 18   Temp: 97.1 °F (36.2 °C)       Physical Exam  Vitals reviewed.   Constitutional:       Appearance: Normal appearance. She is obese.   HENT:      Head: Normocephalic and atraumatic.   Cardiovascular:      Rate and Rhythm: Normal rate and regular rhythm.      Heart sounds: Normal heart sounds, S1 normal and S2 normal. No murmur heard.  Pulmonary:      Effort: Pulmonary effort is normal.      Breath sounds: Normal breath sounds.   Abdominal:      General: There is distension (mild- directly right lateral lap incision at level of internal sutures).      Palpations:  Abdomen is soft.      Tenderness: There is abdominal tenderness (mild, expected, with movement). There is no guarding or rebound.      Hernia: No hernia is present.   Skin:     General: Skin is warm and dry.      Findings: Bruising and erythema present.      Comments: Wound edges are well approximated incisions appear closed with Exofin intact.  Mild circumferential erythema outlined by light yellow ecchymosis noted.     Mild tenderness to palpation as expected.   Neurological:      Mental Status: She is alert.         Assessment:   Post-op, the patient is doing well.     Encounter Diagnoses   Name Primary?   • Obesity, Class II, BMI 35-39.9 Yes   • Primary hypertension    • S/P laparoscopic sleeve gastrectomy    • Prediabetes        Plan:   Patient will start keeping a bp log, follow up with PCP regarding blood pressure. Start taking mirilax as needed for constipation.  Reviewed with patient the importance of following the manual for diet progression. Increase activity as tolerated. Continue increasing daily intake of protein and water.   Return to work: the patient is to return to 3 weeks from their surgery date with no restrictions unless they develop medical problems in which we will see them back in the office. They received a note in our office today with their return to work date.  Activity restrictions: no lifting, pushing or pulling over 25lbs for 3 weeks.   Recommended patient be sure to get at least 70 grams of protein per day. Discussed with the patient the recommended amount of water per day to intake. Reviewed vitamin requirements. Be sure to do routine exercise and increase activity as tolerated. No asa, nsaids or steroids for 8 weeks if gastric sleeve procedure and lifelong if gastric bypass procedure.. Patient may use miralax as needed if necessary.     Instructions / Recommendations: dietary counseling recommended, recommended a daily protein intake of  grams, vitamin supplement(s)  recommended, recommended exercising at least 150 minutes per week, behavior modifications recommended and instructed to call the office for concerns, questions, or problems.     The patient was instructed to follow up at one month follow up appt.     The patient was counseled regarding post op bariatric manual

## 2022-08-15 ENCOUNTER — READMISSION MANAGEMENT (OUTPATIENT)
Dept: CALL CENTER | Facility: HOSPITAL | Age: 52
End: 2022-08-15

## 2022-08-15 NOTE — OUTREACH NOTE
General Surgery Week 2 Survey    Flowsheet Row Responses   [facility] patient discharged from? [facility]   Does the patient have one of the following disease processes/diagnoses(primary or secondary)? General Surgery   Week 2 attempt successful? Yes   Call start time 1031   Call end time 1031   Discharge diagnosis s/p Laparoscopic sleeve gastrectomy   Is patient permission given to speak with other caregiver? Yes   List who call center can speak with Jose De Jesus son    Person spoke with today (if not patient) and relationship Christtanjaer son    Psychosocial issues? No   What is the patient's perception of their health status since discharge? Improving   Week 2 call completed? Yes   Wrap up additional comments call brief - pt's son able to answer above questions           ISABELL H - Registered Nurse

## 2022-08-15 NOTE — PROGRESS NOTES
Subjective:        Jessica Mcdowell is a 51 y.o. female who here for follow up    No chief complaint on file.    Elevated blood pressure    HPI    This is a 51-year-old female, who is new to this provider.  She has a history to include asthma, eczema, recent laparoscopic sleeve gastrrectomy.  Rheumatoid arthritis anemia, history of gout, hemorrhoids, hypertension, kidney stones and heart murmur.  She presents today after following up with her primary care physician and noticing her blood pressure being elevated.  She was recommended to follow-up with her cardiologist.    Her echo March 2022 revealed EF 64.6%, LV diastolic function was normal and no evidence of pericardial effusion.  Stress test April 2022 indicated a normal myocardial perfusion study with no evidence of ischemia.    The following portions of the patient's history were reviewed and updated as appropriate: allergies, current medications, past family history, past medical history, past social history, past surgical history and problem list.    Past Medical History:   Diagnosis Date   • Asthma    • Eczema    • History of anemia    • History of gout    • History of hemorrhoids    • Hypertension    • Kidney stone     history   • Leiomyoma    • Murmur, heart    • PONV (postoperative nausea and vomiting)    • Rheumatoid arthritis (HCC) 02/16/2022         reports that she has never smoked. She has never used smokeless tobacco. She reports current alcohol use. She reports that she does not use drugs.     Family History   Adopted: Yes   Problem Relation Age of Onset   • Malig Hyperthermia Neg Hx        ROS     Review of Systems  Constitutional: No wt loss, fever, fatigue  Gastrointestinal: No nausea, abdominal pain  Behavioral/Psych: No insomnia or anxiety  Cardiovascular: Denies chest pain, and shortness of breath.      Objective:           Vitals and nursing note reviewed.   Constitutional:       Appearance: Well-developed.   HENT:      Head:  Normocephalic.      Right Ear: External ear normal.      Left Ear: External ear normal.   Neck:      Vascular: No JVD.   Pulmonary:      Effort: Pulmonary effort is normal. No respiratory distress.      Breath sounds: Normal breath sounds. No stridor. No rales.   Cardiovascular:      Normal rate. Regular rhythm.      No gallop.   Pulses:     Intact distal pulses.   Abdominal:      General: Bowel sounds are normal. There is no distension.      Palpations: Abdomen is soft.      Tenderness: There is no abdominal tenderness. There is no guarding.   Musculoskeletal: Normal range of motion.         General: No tenderness.      Cervical back: Normal range of motion. Skin:     General: Skin is warm.   Neurological:      Mental Status: Alert and oriented to person, place, and time.      Deep Tendon Reflexes: Reflexes are normal and symmetric.   Psychiatric:         Judgment: Judgment normal.         Procedures  3/2022    Interpretation Summary    · Calculated left ventricular EF = 64.6% Estimated left ventricular EF was in agreement with the calculated left ventricular EF.  · Left ventricular diastolic function was normal.  · There is no evidence of pericardial effusion.    4/2022    Interpretation Summary       · Moderate risk for ischemic heart disease.  · Findings consistent with an equivocal ECG stress test.  · Left ventricular ejection fraction is normal. (Calculated EF = 60%).  · Myocardial perfusion imaging indicates a normal myocardial perfusion study with no evidence of ischemia.  · Impressions are consistent with a low risk study.     Asymptomatic for chest pain. Specificity of study reduced secondary to baseline Non-specific ST-T wave abnormalities Inferior Leads only,  ECG is  suggestive of ischemia with  2-2.5 mm ST-T downslope depression Inferior leads and 1-2 mm ST- T downslope depression Lateral leads.  ECG is  equivocal for  suggestion of ischemia  Ectopy: rare PVC at baseline, occasional PVC's and  multifocal couplets in exercise and none in recovery  Blood pressure response:  appropriate. Baseline 110/74. Peak 158/70. Recovery 168//68  Exercise Tolerance: poor, Reached 18 Of  20 Lopez Scale.  [facility] treadmill score -6.7  Estimates  5-year survival of 95  % . Using the Duke score there is a  Medium risk  of angiographic coronary disease.     Supervised by: Dr. Saleh        Current Outpatient Medications:   •  albuterol (ACCUNEB) 1.25 MG/3ML nebulizer solution, Take 3 mL by nebulization Every 6 (Six) Hours As Needed for Wheezing., Disp: 60 each, Rfl: 0  •  albuterol sulfate  (90 Base) MCG/ACT inhaler, Inhale 2 puffs Every 6 (Six) Hours As Needed for Wheezing or Shortness of Air., Disp: 6.7 g, Rfl: 0  •  azelastine (ASTELIN) 0.1 % nasal spray, 2 sprays into the nostril(s) as directed by provider Daily As Needed., Disp: , Rfl:   •  Calcium 500-100 MG-UNIT chewable tablet, Chew 3 tablets Daily., Disp: , Rfl:   •  cyclobenzaprine (FLEXERIL) 5 MG tablet, Take 1 tablet by mouth 3 (Three) Times a Day As Needed for Muscle Spasms., Disp: 15 tablet, Rfl: 0  •  fluticasone (FLONASE) 50 MCG/ACT nasal spray, 1 spray into each nostril As Needed., Disp: , Rfl:   •  folic acid-vit B6-vit B12 (FOLBEE) 2.5-25-1 MG tablet tablet, Take 1 tablet by mouth Daily., Disp: 30 tablet, Rfl: 0  •  HYDROmorphone (Dilaudid) 2 MG tablet, Take 1 tablet by mouth Every 4 (Four) Hours As Needed for Severe Pain., Disp: 10 tablet, Rfl: 0  •  indapamide (LOZOL) 2.5 MG tablet, Take 1 tablet by mouth Every Morning., Disp: 30 tablet, Rfl: 6  •  lidocaine (Lidoderm) 5 %, Place 1 patch on the skin as directed by provider Daily. Remove & Discard patch within 12 hours or as directed by MD, Disp: 30 patch, Rfl: 0  •  lisinopril (PRINIVIL,ZESTRIL) 5 MG tablet, Take 1 tablet by mouth Every Morning., Disp: 30 tablet, Rfl: 6  •  loratadine (CLARITIN) 10 MG tablet, Take 10 mg by mouth Daily As Needed., Disp: , Rfl:   •  MAGNESIUM PO, Take 1  tablet by mouth Daily., Disp: , Rfl:   •  metoclopramide (Reglan) 10 MG tablet, Take 1 tablet by mouth 4 (Four) Times a Day Before Meals & at Bedtime., Disp: 16 tablet, Rfl: 0  •  multivitamin with minerals tablet tablet, Take 1 tablet by mouth Daily., Disp: , Rfl:   •  ondansetron ODT (ZOFRAN-ODT) 4 MG disintegrating tablet, Place 1 tablet on the tongue Every 4 (Four) Hours As Needed for Nausea or Vomiting., Disp: 14 tablet, Rfl: 0  •  vitamin B-12 (CYANOCOBALAMIN) 100 MCG tablet, Take 100 mcg by mouth Daily. HOLD PRIOR TO SURGERY, Disp: , Rfl:   •  vitamin C (ASCORBIC ACID) 250 MG tablet, Take 250 mg by mouth 2 (Two) Times a Week., Disp: , Rfl:      Assessment:        Patient Active Problem List   Diagnosis   • Iron deficiency anemia secondary to inadequate dietary iron intake   • HTN (hypertension)   • Seasonal allergies   • Chronic fatigue   • Heart murmur   • Asthma   • IBS (irritable bowel syndrome)   • Hemorrhoids   • History of kidney stones   • Chronic pain of both knees   • Rheumatoid arthritis (HCC)   • Gout   • Prediabetes   • History of heart attack   • Localized edema   • Dietary counseling   • Left leg swelling   • Hiatal hernia   • Obesity, Class II, BMI 35-39.9   • S/P laparoscopic sleeve gastrectomy               Plan:     1.  Hypertension: Today in the office her blood pressure is 129/89 and heart rate is 76.  She has recently been to her primary care physician and they recommended her following up with her cardiologist as her blood pressure has been uncontrolled recently. At this time, I will have her monitor her blood pressure and HR.       Educated patient on exercising for at least 30 minutes a day for 2 to 3 days a week. Importance of controlling hypertension and blood pressure checkup on the regular basis has been explained. Hypertension as a silent killer has been discussed. Risk reduction of the weight and regular exercises to control the hypertension has been explained.    2.  History of  left leg swelling    There are no diagnoses linked to this encounter.    COUNSELING: Done    Jessica Y NuckolsCounseling was given to patient for the following topics: diagnostic results, risk factor reductions, impressions, risks and benefits of treatment options and importance of treatment compliance .       SMOKING COUNSELING: Denies    She will follow up in 1 month with a blood pressure diary, unless she needs to be seen sooner.     Sincerely,   AARTI Shaw  [facility]  08/17/22  08:40 EDT    EMR Dragon/Transcription disclaimer:   Much of this encounter note is an electronic transcription/translation of spoken language to printed text. The electronic translation of spoken language may permit erroneous, or at times, nonsensical words or phrases to be inadvertently transcribed; Although I have reviewed the note for such errors, some may still exist.

## 2022-08-17 ENCOUNTER — OFFICE VISIT (OUTPATIENT)
Dept: CARDIOLOGY | Facility: CLINIC | Age: 52
End: 2022-08-17

## 2022-08-17 VITALS
WEIGHT: 253 LBS | BODY MASS INDEX: 38.34 KG/M2 | HEIGHT: 68 IN | DIASTOLIC BLOOD PRESSURE: 89 MMHG | HEART RATE: 76 BPM | SYSTOLIC BLOOD PRESSURE: 129 MMHG

## 2022-08-17 DIAGNOSIS — I10 PRIMARY HYPERTENSION: Primary | ICD-10-CM

## 2022-08-17 DIAGNOSIS — M79.89 LEFT LEG SWELLING: ICD-10-CM

## 2022-08-17 PROCEDURE — 99213 OFFICE O/P EST LOW 20 MIN: CPT | Performed by: NURSE PRACTITIONER

## 2022-08-17 RX ORDER — ENOXAPARIN SODIUM 100 MG/ML
40 INJECTION SUBCUTANEOUS EVERY 12 HOURS SCHEDULED
COMMUNITY
End: 2022-09-09

## 2022-08-20 ENCOUNTER — APPOINTMENT (OUTPATIENT)
Dept: GENERAL RADIOLOGY | Facility: HOSPITAL | Age: 52
End: 2022-08-20

## 2022-08-20 ENCOUNTER — HOSPITAL ENCOUNTER (EMERGENCY)
Facility: HOSPITAL | Age: 52
Discharge: HOME | End: 2022-08-20
Attending: EMERGENCY MEDICINE | Admitting: EMERGENCY MEDICINE

## 2022-08-20 VITALS
DIASTOLIC BLOOD PRESSURE: 80 MMHG | TEMPERATURE: 98 F | HEART RATE: 72 BPM | SYSTOLIC BLOOD PRESSURE: 118 MMHG | BODY MASS INDEX: 38.47 KG/M2 | HEIGHT: 68 IN | RESPIRATION RATE: 18 BRPM | OXYGEN SATURATION: 97 %

## 2022-08-20 DIAGNOSIS — M77.52 TENDONITIS OF ANKLE, LEFT: Primary | ICD-10-CM

## 2022-08-20 PROCEDURE — 73630 X-RAY EXAM OF FOOT: CPT

## 2022-08-20 PROCEDURE — 25010000002 KETOROLAC TROMETHAMINE PER 15 MG: Performed by: EMERGENCY MEDICINE

## 2022-08-20 PROCEDURE — 99282 EMERGENCY DEPT VISIT SF MDM: CPT

## 2022-08-20 PROCEDURE — 96372 THER/PROPH/DIAG INJ SC/IM: CPT

## 2022-08-20 RX ORDER — KETOROLAC TROMETHAMINE 30 MG/ML
30 INJECTION, SOLUTION INTRAMUSCULAR; INTRAVENOUS ONCE
Status: COMPLETED | OUTPATIENT
Start: 2022-08-20 | End: 2022-08-20

## 2022-08-20 RX ADMIN — KETOROLAC TROMETHAMINE 30 MG: 30 INJECTION, SOLUTION INTRAMUSCULAR at 21:10

## 2022-08-21 NOTE — ED PROVIDER NOTES
" EMERGENCY DEPARTMENT ENCOUNTER    Room Number:  36/36  Date of encounter:  8/20/2022  PCP: Jun Aguillon MD  Historian: Patient      HPI:  Chief Complaint: Left foot pain  A complete HPI/ROS/PMH/PSH/SH/FH are unobtainable due to: None    Context: Jessica Mcdowell is a 52 y.o. female who presents to the ED c/o severe left foot pain for the last few days.  Pain is located on the dorsum of the foot and anterior part of the ankle.  It is throbbing, hurts particular when she tries to walk and dorsiflex her foot.  There is no numbness or tingling and she feels as though it is a little swollen.  Patient said that today she was unable to bear weight due to the pain and was using crutches.    She did not have any acute injury that she is aware of, but she has had issues with that foot in the past, related to \"arthritis or something\".    She has taken over-the-counter pain reliever without improvement, there is no fever, and no other associated symptoms      PAST MEDICAL HISTORY  Active Ambulatory Problems     Diagnosis Date Noted   • Iron deficiency anemia secondary to inadequate dietary iron intake 05/22/2012   • HTN (hypertension) 12/29/2020   • Seasonal allergies 02/16/2022   • Chronic fatigue 02/16/2022   • Heart murmur 02/16/2022   • Asthma 02/16/2022   • IBS (irritable bowel syndrome) 02/16/2022   • Hemorrhoids 02/16/2022   • History of kidney stones 02/16/2022   • Chronic pain of both knees 02/16/2022   • Rheumatoid arthritis (HCC) 02/16/2022   • Gout 02/16/2022   • Prediabetes 02/16/2022   • History of heart attack 02/16/2022   • Localized edema 02/16/2022   • Dietary counseling 02/16/2022   • Left leg swelling 04/17/2022   • Hiatal hernia 08/03/2022   • Obesity, Class II, BMI 35-39.9 08/10/2022   • S/P laparoscopic sleeve gastrectomy 08/10/2022     Resolved Ambulatory Problems     Diagnosis Date Noted   • Abnormal uterine bleeding 01/15/2018   • Weight gain 02/16/2022   • Obesity, Class III, BMI 40-49.9 (morbid " obesity) ([facility]) 2022   • Preoperative testing 2022     Past Medical History:   Diagnosis Date   • Eczema    • History of anemia    • History of gout    • History of hemorrhoids    • Hypertension    • Kidney stone    • Leiomyoma    • Murmur, heart    • PONV (postoperative nausea and vomiting)          PAST SURGICAL HISTORY  Past Surgical History:   Procedure Laterality Date   • ABDOMINOPLASTY     •  SECTION      x2   • COLONOSCOPY     • ENDOSCOPY     • GASTRIC SLEEVE LAPAROSCOPIC N/A 8/3/2022    Procedure: GASTRIC SLEEVE LAPAROSCOPIC OR ROBOTIC, HIATAL HERNIA REPAIR;  Surgeon: Randal Calix Jr., MD;  Location: [facility];  Service: Robotics - DaVinci;  Laterality: N/A;   • HEMORRHOIDECTOMY     • HYSTERECTOMY SUPRACERVICAL Bilateral 01/15/2018    Procedure: LAPAROSCOPIC SUPRACERVICAL HYSTERECTOMY WITH DAVINCI ROBOT BILATERAL SALPINGECTOMY;  Surgeon: Brigitte Roldan MD;  Location: [facility] OR;  Service:    • LIPOMA EXCISION      back   • WISDOM TOOTH EXTRACTION      THREE         FAMILY HISTORY  Family History   Adopted: Yes   Problem Relation Age of Onset   • Malig Hyperthermia Neg Hx          SOCIAL HISTORY  Social History     Socioeconomic History   • Marital status: Single   Tobacco Use   • Smoking status: Never Smoker   • Smokeless tobacco: Never Used   Vaping Use   • Vaping Use: Never used   Substance and Sexual Activity   • Alcohol use: Yes     Comment: MONTHLY 1 DRINKS   • Drug use: Never   • Sexual activity: Defer     Birth control/protection: Condom, Surgical         ALLERGIES  Iodine and Shellfish-derived products        REVIEW OF SYSTEMS  Review of Systems     All systems reviewed and negative except for those discussed in HPI.       PHYSICAL EXAM    I have reviewed the triage vital signs and nursing notes.    ED Triage Vitals [22]   Temp Heart Rate Resp BP SpO2   97 °F (36.1 °C) 95 16 129/81 97 %      Temp src Heart Rate Source Patient Position BP Location FiO2 (%)    Tympanic Monitor Sitting Right arm --       Physical Exam  GENERAL: Awake and alert, nontoxic-appearing  HENT: nares patent  EYES: no scleral icterus  CV: regular rhythm, regular rate  RESPIRATORY: normal effort  MUSCULOSKELETAL: no deformity.  There is some mild soft tissue swelling over the dorsum of the left midfoot and extending slightly up onto the ankle.  There is no erythema or warmth, it is tender to palpation, and there is significant increase in pain with dorsiflexion of the foot.  DP and PT pulses are 2+, and there is no neurovascular tenderness deficit  NEURO: alert, moves all extremities, follows commands  SKIN: warm, dry        LAB RESULTS  No results found for this or any previous visit (from the past 24 hour(s)).    Ordered the above labs and independently reviewed the results.        RADIOLOGY  XR Foot 3+ View Left    Result Date: 8/20/2022  3 VIEWS LEFT FOOT  HISTORY: Left foot pain  COMPARISON: None available  FINDINGS: No acute fracture or subluxation of the left foot is seen. No aggressive osseous abnormalities are identified. There may be some soft tissue swelling overlying the dorsum of foot. Correlation with physical exam findings is ingested. There are some degenerative changes involving the midfoot. Enthesopathic change is seen along the plantar aspect of the calcaneus.      No acute osseous findings.  This report was finalized on 8/20/2022 9:18 PM by Dr. Kimberly Nice M.D.        I ordered the above noted radiological studies. Reviewed by me and discussed with radiologist.  See dictation for official radiology interpretation.      PROCEDURES    Procedures      MEDICATIONS GIVEN IN ER    Medications   ketorolac (TORADOL) injection 30 mg (30 mg Intramuscular Given 8/20/22 2110)         PROGRESS, DATA ANALYSIS, CONSULTS, AND MEDICAL DECISION MAKING    All labs have been independently reviewed by me.  All radiology studies have been reviewed by me and discussed with radiologist dictating  the report.   EKG's independently viewed and interpreted by me.  Discussion below represents my analysis of pertinent findings related to patient's condition, differential diagnosis, treatment plan and final disposition.        ED Course as of 08/20/22 2210   Sat Aug 20, 2022   2208 Plain film shows some mild soft tissue swelling over the dorsum of the midfoot consistent with the exam.  There are no acute bony findings of fracture or malalignment. [DP]   2209 Based on my history and physical exam along with the x-ray, I feel the patient has pretty clear extensor tendinitis [DP]   2209 I provided her with a walking boot, and she has crutches. [DP]   2209 I gave her a Toradol injection.  Patient was asking for a prescription of something stronger than anti-inflammatory medication at discharge, and I advised that would not be indicated for this condition [DP]   2209 Encouraged her to ice frequently, use the boot to help with the discomfort of ambulation, and follow-up with orthopedic [DP]      ED Course User Index  [DP] Yovani Mancia MD           PPE: The patient wore a surgical mask throughout the entire patient encounter. I wore an N95.    AS OF 22:10 EDT VITALS:    BP - 118/80  HR - 72  TEMP - 98 °F (36.7 °C) (Oral)  O2 SATS - 97%        DIAGNOSIS  Final diagnoses:   Tendonitis of ankle, left         DISPOSITION  Discharge           Yovani Mancia MD  08/20/22 2210

## 2022-08-21 NOTE — ED TRIAGE NOTES
Pt to ED via PV from home accompanied by family using crutches. Pt assisted to WC in triage. Pt reports that yesterday she started with pain to the top of her left foot that was mild but when she awoke today, the pain intensified and she has been unable to bear weight on her left foot. Pt denies prior history or injury to the area.     This RN in appropriate PPE for all patient care interactions. Pt masked and redirected for proper mask use when necessary. Hand hygiene performed before and after all patient care interactions.

## 2022-08-24 ENCOUNTER — TELEPHONE (OUTPATIENT)
Dept: ORTHOPEDIC SURGERY | Facility: CLINIC | Age: 52
End: 2022-08-24

## 2022-08-24 ENCOUNTER — READMISSION MANAGEMENT (OUTPATIENT)
Dept: CALL CENTER | Facility: HOSPITAL | Age: 52
End: 2022-08-24

## 2022-08-24 NOTE — OUTREACH NOTE
General Surgery Week 3 Survey    Flowsheet Row Responses   [facility] patient discharged from? [place]   Does the patient have one of the following disease processes/diagnoses(primary or secondary)? General Surgery   Week 3 attempt successful? No   Unsuccessful attempts Attempt 1          YANDEL ALBRIGHT - Registered Nurse

## 2022-08-24 NOTE — TELEPHONE ENCOUNTER
"PATIENT HAS CALLED THE HUB TO SEE WHY SHE HASNT BEEN SCHEDULED BUT I DO NOT BELIEVE DR BARAHONA TREATS FOR THIS. PATIENT WAS TOLD WE WOULD MESSAGE THE DOCTOR AND ASK HIM. BELOW ARE THE COMMUNICATIONS FROM THE HUB:    PATIENT CALLED TO SCHEDULE [facility] ER FOLLOW UP -NEW PATIENT / LEFT ANKLE TENDONITIS / PAIN & SWELLING SINCE FRI 08-19-22 / XR 08-20-22 (Epic) / NO PRIOR LEFT ANKLE NOR FOOT SURGERIES     -PATIENT WAS REFERRED TO DR BARAHONA FROM [facility] ER     -FOOT / ANKLE TENDONITIS IS NOT A SPECIFIC DIAGNOSIS ON [facility] ([facility]) REF TOOL FOR EITHER DR BARAHONA NOR PAIGE LOPEZ IN [facility]. -RODDY AT [facility]  STATED \"IF IT'S NOT IN THE REFERENCE TOOL, WE DON'T SEE IT\"     HOWEVER REFERRAL SENT FOR SCHEDULE REVIEW TO ASK IF EITHER DR BARAHONA WILL SEE AS REFERRED TO HIM, OR IF PAIGE LOPEZ WILL SEE SINCE DR BARAHONA'S 1st AVAILABLE APPT IS 09-29-22 & SWELLING IS URGENT / \"RED FLAG\" KEYWORD IN REF TOOL    PLEASE ADVISE  "

## 2022-08-31 ENCOUNTER — READMISSION MANAGEMENT (OUTPATIENT)
Dept: CALL CENTER | Facility: HOSPITAL | Age: 52
End: 2022-08-31

## 2022-08-31 NOTE — OUTREACH NOTE
General Surgery Week 4 Survey    Flowsheet Row Responses   [facility] patient discharged from? [place]   Does the patient have one of the following disease processes/diagnoses(primary or secondary)? General Surgery   Week 4 attempt successful? Yes   Call start time 1203   Call end time 1207   Discharge diagnosis s/p Laparoscopic sleeve gastrectomy   Is the patient taking all medications as directed (includes completed medication regime)? N/A   Psychosocial issues? No   What is the patient's perception of their health status since discharge? Improving   Nursing interventions Nurse provided patient education   Is the patient/caregiver able to teach back steps to recovery at home? Set small, achievable goals for return to baseline health, Rest and rebuild strength, gradually increase activity, Make a list of questions for surgeon's appointment   If the patient is a current smoker, are they able to teach back resources for cessation? Not a smoker   Is the patient/caregiver able to teach back the hierarchy of who to call/visit for symptoms/problems? PCP, Specialist, Home health nurse, Urgent Care, ED, 911 Yes   Week 4 call completed? Yes   Would the patient like one additional call? No   Graduated Yes   Is the patient interested in additional calls from an ambulatory ?  NOTE:  applies to high risk patients requiring additional follow-up. No   Did the patient feel the follow up calls were helpful during their recovery period? Yes   Was the number of calls appropriate? Yes          INGA CHANDLER - Registered Nurse

## 2022-09-09 ENCOUNTER — LAB (OUTPATIENT)
Dept: LAB | Facility: HOSPITAL | Age: 52
End: 2022-09-09

## 2022-09-09 ENCOUNTER — OFFICE VISIT (OUTPATIENT)
Dept: BARIATRICS/WEIGHT MGMT | Facility: CLINIC | Age: 52
End: 2022-09-09

## 2022-09-09 VITALS
HEART RATE: 78 BPM | BODY MASS INDEX: 36.83 KG/M2 | TEMPERATURE: 97.1 F | DIASTOLIC BLOOD PRESSURE: 93 MMHG | SYSTOLIC BLOOD PRESSURE: 141 MMHG | WEIGHT: 243 LBS | HEIGHT: 68 IN

## 2022-09-09 DIAGNOSIS — E66.9 OBESITY, CLASS II, BMI 35-39.9: Primary | ICD-10-CM

## 2022-09-09 DIAGNOSIS — I10 PRIMARY HYPERTENSION: ICD-10-CM

## 2022-09-09 DIAGNOSIS — E66.9 OBESITY, CLASS II, BMI 35-39.9: ICD-10-CM

## 2022-09-09 DIAGNOSIS — R60.0 LOCALIZED EDEMA: ICD-10-CM

## 2022-09-09 DIAGNOSIS — Z98.84 S/P LAPAROSCOPIC SLEEVE GASTRECTOMY: ICD-10-CM

## 2022-09-09 DIAGNOSIS — K44.9 HIATAL HERNIA: ICD-10-CM

## 2022-09-09 DIAGNOSIS — K59.09 OTHER CONSTIPATION: ICD-10-CM

## 2022-09-09 PROBLEM — K59.00 CONSTIPATION: Status: ACTIVE | Noted: 2022-09-09

## 2022-09-09 LAB
25(OH)D3 SERPL-MCNC: 33.2 NG/ML (ref 30–100)
ALBUMIN SERPL-MCNC: 4.7 G/DL (ref 3.5–5.2)
ALBUMIN/GLOB SERPL: 1.7 G/DL
ALP SERPL-CCNC: 89 U/L (ref 39–117)
ALT SERPL W P-5'-P-CCNC: 14 U/L (ref 1–33)
ANION GAP SERPL CALCULATED.3IONS-SCNC: 9 MMOL/L (ref 5–15)
AST SERPL-CCNC: 25 U/L (ref 1–32)
BASOPHILS # BLD AUTO: 0.02 10*3/MM3 (ref 0–0.2)
BASOPHILS NFR BLD AUTO: 0.5 % (ref 0–1.5)
BILIRUB SERPL-MCNC: 0.6 MG/DL (ref 0–1.2)
BUN SERPL-MCNC: 9 MG/DL (ref 6–20)
BUN/CREAT SERPL: 10.8 (ref 7–25)
CALCIUM SPEC-SCNC: 9.7 MG/DL (ref 8.6–10.5)
CHLORIDE SERPL-SCNC: 102 MMOL/L (ref 98–107)
CO2 SERPL-SCNC: 27 MMOL/L (ref 22–29)
CREAT SERPL-MCNC: 0.83 MG/DL (ref 0.57–1)
DEPRECATED RDW RBC AUTO: 41.8 FL (ref 37–54)
EGFRCR SERPLBLD CKD-EPI 2021: 86 ML/MIN/1.73
EOSINOPHIL # BLD AUTO: 0.09 10*3/MM3 (ref 0–0.4)
EOSINOPHIL NFR BLD AUTO: 2.4 % (ref 0.3–6.2)
ERYTHROCYTE [DISTWIDTH] IN BLOOD BY AUTOMATED COUNT: 14.8 % (ref 12.3–15.4)
FERRITIN SERPL-MCNC: 507 NG/ML (ref 13–150)
FOLATE SERPL-MCNC: 17.9 NG/ML (ref 4.78–24.2)
GLOBULIN UR ELPH-MCNC: 2.8 GM/DL
GLUCOSE SERPL-MCNC: 86 MG/DL (ref 65–99)
HCT VFR BLD AUTO: 35.7 % (ref 34–46.6)
HGB BLD-MCNC: 11.7 G/DL (ref 12–15.9)
IMM GRANULOCYTES # BLD AUTO: 0.01 10*3/MM3 (ref 0–0.05)
IMM GRANULOCYTES NFR BLD AUTO: 0.3 % (ref 0–0.5)
IRON 24H UR-MRATE: 44 MCG/DL (ref 37–145)
LYMPHOCYTES # BLD AUTO: 0.92 10*3/MM3 (ref 0.7–3.1)
LYMPHOCYTES NFR BLD AUTO: 24.3 % (ref 19.6–45.3)
MCH RBC QN AUTO: 26 PG (ref 26.6–33)
MCHC RBC AUTO-ENTMCNC: 32.8 G/DL (ref 31.5–35.7)
MCV RBC AUTO: 79.3 FL (ref 79–97)
MONOCYTES # BLD AUTO: 0.32 10*3/MM3 (ref 0.1–0.9)
MONOCYTES NFR BLD AUTO: 8.4 % (ref 5–12)
NEUTROPHILS NFR BLD AUTO: 2.43 10*3/MM3 (ref 1.7–7)
NEUTROPHILS NFR BLD AUTO: 64.1 % (ref 42.7–76)
NRBC BLD AUTO-RTO: 0 /100 WBC (ref 0–0.2)
PLATELET # BLD AUTO: 154 10*3/MM3 (ref 140–450)
PMV BLD AUTO: 12.2 FL (ref 6–12)
POTASSIUM SERPL-SCNC: 3.7 MMOL/L (ref 3.5–5.2)
PREALB SERPL-MCNC: 16.5 MG/DL (ref 20–40)
PROT SERPL-MCNC: 7.5 G/DL (ref 6–8.5)
RBC # BLD AUTO: 4.5 10*6/MM3 (ref 3.77–5.28)
SODIUM SERPL-SCNC: 138 MMOL/L (ref 136–145)
WBC NRBC COR # BLD: 3.79 10*3/MM3 (ref 3.4–10.8)

## 2022-09-09 PROCEDURE — 80053 COMPREHEN METABOLIC PANEL: CPT

## 2022-09-09 PROCEDURE — 82746 ASSAY OF FOLIC ACID SERUM: CPT

## 2022-09-09 PROCEDURE — 82728 ASSAY OF FERRITIN: CPT

## 2022-09-09 PROCEDURE — 84134 ASSAY OF PREALBUMIN: CPT

## 2022-09-09 PROCEDURE — 83540 ASSAY OF IRON: CPT

## 2022-09-09 PROCEDURE — 84425 ASSAY OF VITAMIN B-1: CPT

## 2022-09-09 PROCEDURE — 85025 COMPLETE CBC W/AUTO DIFF WBC: CPT

## 2022-09-09 PROCEDURE — 36415 COLL VENOUS BLD VENIPUNCTURE: CPT

## 2022-09-09 PROCEDURE — 99024 POSTOP FOLLOW-UP VISIT: CPT | Performed by: NURSE PRACTITIONER

## 2022-09-09 PROCEDURE — 82306 VITAMIN D 25 HYDROXY: CPT

## 2022-09-09 PROCEDURE — 83921 ORGANIC ACID SINGLE QUANT: CPT

## 2022-09-09 NOTE — PROGRESS NOTES
[facility] BARIATRIC [facility] BARIATRIC SURGERY  [address] [facility] [address]  [facility] [address]  [phone]  [address] [facility] [address]  [facility] [id number] [phone]  Dept: [phone]  9/9/2022      Jessica Mcdowell.  [id number]  [id number]  1970  female      Chief Complaint   Patient presents with   • Post-op     1MPO  PD:8/3/22       BH Post-Op Bariatric Surgery:   Jessica Mcdowell is status post Laparoscopic Sleeve/HH procedure, performed on 8/3/22     HPI:   Today's weight is 110 kg (243 lb) pounds, today's BMI is Body mass index is 36.95 kg/m².,@ has a  loss of 19 pounds since the last visit and@ weight loss since surgery is 32 pounds. The patient reports a decreased portion size and loss of appetite.      Jessica Mcdowell denies nausea, vomiting, reflux and reports that she is tolerating 1-2 bites of meat in a sitting. She reports that she has been having a hard time finding time to get a shake. She has been doing a slimfast shake in the mornings. She doesn't usually get lunch. She is drinking a 32oz jug of water and drinks two of those per day. She does report that most foods taste off. She prepped some ground turkey with man-which. She will have a tablespoon full for dinner at a time.      Diet and Exercise: Diet history reviewed and discussed with the patient. Weight loss/gains to date discussed with the patient. The patient states they are eating 60 grams of protein per day. She reports eating 1-2 meals per day, a typical portion size of 1/2 cup, eating 1-2 snacks per day, drinking 5-6 or more 8-oz. glasses of water per day, no carbonated beverage consumption and exercising regularly- walking but is in a boot currently so has been limited    Supplements: celebrate MTV with iron and calcium.     Review of Systems   Constitutional: Positive for appetite change. Negative for fatigue and unexpected weight change.   HENT: Negative.    Eyes: Negative.    Respiratory:  Negative.    Cardiovascular: Negative.  Negative for leg swelling.   Gastrointestinal: Negative for abdominal distention, abdominal pain, constipation, diarrhea, nausea and vomiting.   Genitourinary: Negative for difficulty urinating, frequency and urgency.   Musculoskeletal: Negative for back pain.   Skin: Negative.    Psychiatric/Behavioral: Negative.    All other systems reviewed and are negative.      Patient Active Problem List   Diagnosis   • Iron deficiency anemia secondary to inadequate dietary iron intake   • HTN (hypertension)   • Seasonal allergies   • Chronic fatigue   • Heart murmur   • Asthma   • IBS (irritable bowel syndrome)   • Hemorrhoids   • History of kidney stones   • Chronic pain of both knees   • Rheumatoid arthritis (HCC)   • Gout   • Prediabetes   • History of heart attack   • Localized edema   • Dietary counseling   • Left leg swelling   • Hiatal hernia   • Obesity, Class II, BMI 35-39.9   • S/P laparoscopic sleeve gastrectomy       Past Medical History:   Diagnosis Date   • Asthma    • Eczema    • History of anemia    • History of gout    • History of hemorrhoids    • Hypertension    • Kidney stone     history   • Leiomyoma    • Murmur, heart    • PONV (postoperative nausea and vomiting)    • Rheumatoid arthritis (HCC) 02/16/2022       The following portions of the patient's history were reviewed and updated as appropriate: allergies, current medications, past family history, past medical history, past social history, past surgical history and problem list.    Vitals:    09/09/22 1539   BP: 141/93   Pulse: 78   Temp: 97.1 °F (36.2 °C)       Physical Exam  Vitals and nursing note reviewed.   Constitutional:       Appearance: She is well-developed.   Neck:      Thyroid: No thyromegaly.   Cardiovascular:      Rate and Rhythm: Normal rate.   Pulmonary:      Effort: Pulmonary effort is normal. No respiratory distress.   Abdominal:      Palpations: Abdomen is soft.   Musculoskeletal:          General: No tenderness.   Skin:     General: Skin is warm and dry.   Neurological:      Mental Status: She is alert.   Psychiatric:         Behavior: Behavior normal.         Assessment:   Post-op, the patient is doing well.     Encounter Diagnoses   Name Primary?   • Obesity, Class II, BMI 35-39.9 Yes   • S/P laparoscopic sleeve gastrectomy    • Primary hypertension    • Hiatal hernia    • Localized edema        Plan:   Encouraged patient to be sure to get plenty of lean protein per day through small frequent meals all with a protein source.   Activity restrictions: none.   Recommended patient be sure to get at least 70 grams of protein per day by eating small, frequent meals all with high lean protein choices. Be sure to limit/cut back on daily carbohydrate intake. Discussed with the patient the recommended amount of water per day to intake- half of body weight in ounces. Reviewed vitamin requirements. Be sure to do routine exercise, 150 minutes per week minimum, including both cardio and strength training.     Instructions / Recommendations: dietary counseling recommended, recommended a daily protein intake of  grams, vitamin supplement(s) recommended, recommended exercising at least 150 minutes per week, behavior modifications recommended and instructed to call the office for concerns, questions, or problems.     The patient was instructed to follow up in 3 months .     Total time spent during this encounter today was 25 minutes   on presentation with , VBG without acidosis, BHB 0.6, AG wnl, no mental status changes. No concern for DKA/HHS at this time.   -Etiology: non-compliance with pre-meal insulin due to fear of hypoglycemia   -Continuing home Lantus 30 units qHs and decreased Lispro to 8 unit TID given report of very frequent episodes of hypoglycemia with pre-meal insulin  -Endocrine consulted for her poorly controlled Type 1 DM (HbA1c 11.4 around 1 week ago per patient).   -Patient needs to establish follow-up as she recently moved from [facility] for coverage  -Monitor FS  -Diabetic Diet  -IVF @ 100cc/h to d/c upon PO intake

## 2022-09-13 LAB — METHYLMALONATE SERPL-SCNC: 99 NMOL/L (ref 0–378)

## 2022-09-14 ENCOUNTER — OFFICE VISIT (OUTPATIENT)
Dept: CARDIOLOGY | Age: 52
End: 2022-09-14

## 2022-09-14 VITALS
HEART RATE: 74 BPM | SYSTOLIC BLOOD PRESSURE: 127 MMHG | HEIGHT: 68 IN | WEIGHT: 242 LBS | DIASTOLIC BLOOD PRESSURE: 80 MMHG | BODY MASS INDEX: 36.68 KG/M2

## 2022-09-14 DIAGNOSIS — R60.9 FLUID RETENTION: ICD-10-CM

## 2022-09-14 DIAGNOSIS — I10 PRIMARY HYPERTENSION: ICD-10-CM

## 2022-09-14 DIAGNOSIS — R01.1 HEART MURMUR: Primary | ICD-10-CM

## 2022-09-14 PROCEDURE — 99213 OFFICE O/P EST LOW 20 MIN: CPT | Performed by: INTERNAL MEDICINE

## 2022-09-14 NOTE — PROGRESS NOTES
"1 MONTH FOLLOW UP    Subjective:        Jessica Mcdowell is a 52 y.o. female who here for follow up    CC  LOZOL FOLLOW UP  HPI  52-year-old female with a hypertension heart murmur fluid retention using Lozol as needed seems to be doing well     Problems Addressed this Visit        Cardiac and Vasculature    HTN (hypertension)    Heart murmur - Primary       Symptoms and Signs    Fluid retention      Diagnoses       Codes Comments    Heart murmur    -  Primary ICD-10-CM: R01.1  ICD-9-CM: 785.2     Primary hypertension     ICD-10-CM: I10  ICD-9-CM: 401.9     Fluid retention     ICD-10-CM: R60.9  ICD-9-CM: 276.69         .    The following portions of the patient's history were reviewed and updated as appropriate: allergies, current medications, past family history, past medical history, past social history, past surgical history and problem list.    Past Medical History:   Diagnosis Date   • Asthma    • Eczema    • History of anemia    • History of gout    • History of hemorrhoids    • Hypertension    • Kidney stone     history   • Leiomyoma    • Murmur, heart    • PONV (postoperative nausea and vomiting)    • Rheumatoid arthritis (HCC) 02/16/2022     reports that she has never smoked. She has never used smokeless tobacco. She reports current alcohol use. She reports that she does not use drugs.   Family History   Adopted: Yes   Problem Relation Age of Onset   • Malig Hyperthermia Neg Hx        Review of Systems  Constitutional: No wt loss, fever, fatigue  Gastrointestinal: No nausea, abdominal pain  Behavioral/Psych: No insomnia or anxiety   Cardiovascular no chest pains or tightness in the chest  Objective:       Physical Exam  /80   Pulse 74   Ht 172.7 cm (68\")   Wt 110 kg (242 lb)   LMP 01/08/2018 (Exact Date)   BMI 36.80 kg/m²   General appearance: No acute changes   Neck: Trachea midline; NECK, supple, no thyromegaly or lymphadenopathy   Lungs: Normal size and shape, normal breath sounds, equal " distribution of air, no rales and rhonchi   CV: S1-S2 regular, no murmurs, no rub, no gallop   Abdomen: Soft, nontender; no masses , no abnormal abdominal sounds   Extremities: No deformity , normal color , no peripheral edema   Skin: Normal temperature, turgor and texture; no rash, ulcers          Procedures      Echocardiogram:        Current Outpatient Medications:   •  albuterol (ACCUNEB) 1.25 MG/3ML nebulizer solution, Take 3 mL by nebulization Every 6 (Six) Hours As Needed for Wheezing., Disp: 60 each, Rfl: 0  •  albuterol sulfate  (90 Base) MCG/ACT inhaler, Inhale 2 puffs Every 6 (Six) Hours As Needed for Wheezing or Shortness of Air., Disp: 6.7 g, Rfl: 0  •  azelastine (ASTELIN) 0.1 % nasal spray, 2 sprays into the nostril(s) as directed by provider Daily As Needed., Disp: , Rfl:   •  Calcium 500-100 MG-UNIT chewable tablet, Chew 3 tablets Daily., Disp: , Rfl:   •  cyclobenzaprine (FLEXERIL) 5 MG tablet, Take 1 tablet by mouth 3 (Three) Times a Day As Needed for Muscle Spasms., Disp: 15 tablet, Rfl: 0  •  fluticasone (FLONASE) 50 MCG/ACT nasal spray, 1 spray into each nostril As Needed., Disp: , Rfl:   •  HYDROmorphone (Dilaudid) 2 MG tablet, Take 1 tablet by mouth Every 4 (Four) Hours As Needed for Severe Pain., Disp: 10 tablet, Rfl: 0  •  indapamide (LOZOL) 2.5 MG tablet, Take 1 tablet by mouth Every Morning., Disp: 30 tablet, Rfl: 6  •  lidocaine (Lidoderm) 5 %, Place 1 patch on the skin as directed by provider Daily. Remove & Discard patch within 12 hours or as directed by MD, Disp: 30 patch, Rfl: 0  •  lisinopril (PRINIVIL,ZESTRIL) 5 MG tablet, Take 1 tablet by mouth Every Morning., Disp: 30 tablet, Rfl: 6  •  loratadine (CLARITIN) 10 MG tablet, Take 10 mg by mouth Daily As Needed., Disp: , Rfl:   •  MAGNESIUM PO, Take 1 tablet by mouth Daily., Disp: , Rfl:   •  multivitamin with minerals tablet tablet, Take 1 tablet by mouth Daily., Disp: , Rfl:   •  ondansetron ODT (ZOFRAN-ODT) 4 MG  disintegrating tablet, Place 1 tablet on the tongue Every 4 (Four) Hours As Needed for Nausea or Vomiting., Disp: 14 tablet, Rfl: 0  •  vitamin B-12 (CYANOCOBALAMIN) 100 MCG tablet, Take 100 mcg by mouth Daily. HOLD PRIOR TO SURGERY, Disp: , Rfl:   •  vitamin C (ASCORBIC ACID) 250 MG tablet, Take 250 mg by mouth 2 (Two) Times a Week., Disp: , Rfl:    Assessment:        Patient Active Problem List   Diagnosis   • Iron deficiency anemia secondary to inadequate dietary iron intake   • HTN (hypertension)   • Seasonal allergies   • Chronic fatigue   • Heart murmur   • Asthma   • IBS (irritable bowel syndrome)   • Hemorrhoids   • History of kidney stones   • Chronic pain of both knees   • Rheumatoid arthritis (HCC)   • Gout   • Prediabetes   • History of heart attack   • Localized edema   • Dietary counseling   • Left leg swelling   • Hiatal hernia   • Obesity, Class II, BMI 35-39.9   • S/P laparoscopic sleeve gastrectomy   • Constipation               Plan:            ICD-10-CM ICD-9-CM   1. Heart murmur  R01.1 785.2   2. Primary hypertension  I10 401.9   3. Fluid retention  R60.9 276.69     1. Heart murmur  Stable    2. Primary hypertension  Blood pressure stable    3. Fluid retention  Use a low-salt TN     LOZOL PRN , WORKING  1 YR  COUNSELING:    Jessica Sultana was given to patient for the following topics: diagnostic results, risk factor reductions, impressions, risks and benefits of treatment options and importance of treatment compliance .       SMOKING COUNSELING:    [unfilled]    Dictated using Dragon dictation

## 2022-09-15 LAB — VIT B1 BLD-SCNC: 72.5 NMOL/L (ref 66.5–200)

## 2022-09-16 ENCOUNTER — TELEPHONE (OUTPATIENT)
Dept: BARIATRICS/WEIGHT MGMT | Facility: CLINIC | Age: 52
End: 2022-09-16

## 2022-09-16 NOTE — TELEPHONE ENCOUNTER
Cannot reach patient, unable to left message, trying to inform about labs  ----- Message from AARTI Marr sent at 9/16/2022 12:52 PM EDT -----  Please encourage pt to increase protein intake. Ferritin will come down as inflammation does and we will plan to rend her iron at 6mo follow up.

## 2022-09-20 ENCOUNTER — TELEPHONE (OUTPATIENT)
Dept: BARIATRICS/WEIGHT MGMT | Facility: CLINIC | Age: 52
End: 2022-09-20

## 2022-09-20 NOTE — TELEPHONE ENCOUNTER
Been trying to communication with patient, unable to contact & voicemail is full  ----- Message from AARTI Marr sent at 9/16/2022 12:52 PM EDT -----  Please encourage pt to increase protein intake. Ferritin will come down as inflammation does and we will plan to rend her iron at 6mo follow up.

## 2022-09-23 ENCOUNTER — TELEPHONE (OUTPATIENT)
Dept: BARIATRICS/WEIGHT MGMT | Facility: CLINIC | Age: 52
End: 2022-09-23

## 2022-09-23 NOTE — TELEPHONE ENCOUNTER
Unable to contact the patient inform about lab results. Unable to send a Starline message as wel  ----- Message from AARTI Marr sent at 9/16/2022 12:52 PM EDT -----  Please encourage pt to increase protein intake. Ferritin will come down as inflammation does and we will plan to rend her iron at 6mo follow up.

## 2022-10-04 ENCOUNTER — TELEPHONE (OUTPATIENT)
Dept: BARIATRICS/WEIGHT MGMT | Facility: CLINIC | Age: 52
End: 2022-10-04

## 2022-10-04 NOTE — TELEPHONE ENCOUNTER
Unable to contact the patient & unable to left message. Trying to inform about results lab.   Detail Level: Generalized General Sunscreen Counseling: I recommended a broad spectrum sunscreen with a SPF of 50 or higher. Sunscreens should be applied at least 15 minutes prior to expected sun exposure and then every 2 hours after that as long as sun exposure continues. If swimming or exercising, sunscreen should be reapplied every 45 minutes to an hour after getting wet or sweating.  One ounce, or the equivalent of a shot glass full of sunscreen, is adequate to protect the skin not covered by a bathing suit. I also recommended a lip balm with a sunscreen as well. Sun protective clothing can be used in lieu of sunscreen but must be worn the entire time you are exposed to the sun's rays.

## 2022-10-26 RX ORDER — LISINOPRIL 5 MG/1
5 TABLET ORAL EVERY MORNING
Qty: 30 TABLET | Refills: 6 | Status: SHIPPED | OUTPATIENT
Start: 2022-10-26

## 2022-11-09 ENCOUNTER — OFFICE VISIT (OUTPATIENT)
Dept: BARIATRICS/WEIGHT MGMT | Facility: CLINIC | Age: 52
End: 2022-11-09

## 2022-11-09 VITALS
DIASTOLIC BLOOD PRESSURE: 92 MMHG | WEIGHT: 223 LBS | HEART RATE: 58 BPM | HEIGHT: 68 IN | BODY MASS INDEX: 33.8 KG/M2 | SYSTOLIC BLOOD PRESSURE: 135 MMHG | TEMPERATURE: 97.5 F

## 2022-11-09 DIAGNOSIS — K44.9 HIATAL HERNIA: ICD-10-CM

## 2022-11-09 DIAGNOSIS — E66.9 OBESITY, CLASS I, BMI 30-34.9: Primary | ICD-10-CM

## 2022-11-09 DIAGNOSIS — J45.20 MILD INTERMITTENT ASTHMA WITHOUT COMPLICATION: ICD-10-CM

## 2022-11-09 DIAGNOSIS — Z98.84 S/P LAPAROSCOPIC SLEEVE GASTRECTOMY: ICD-10-CM

## 2022-11-09 DIAGNOSIS — I10 PRIMARY HYPERTENSION: ICD-10-CM

## 2022-11-09 PROCEDURE — 99213 OFFICE O/P EST LOW 20 MIN: CPT | Performed by: NURSE PRACTITIONER

## 2022-11-09 NOTE — PROGRESS NOTES
[facility] BARIATRIC [facility] BARIATRIC SURGERY  [address] [facility] [address]  [facility] [address]  [phone]  [address] [facility] [address]  [facility] [address]  [phone]  Dept: [phone]  11/16/2022      Jessica Mcdowell.  [id number]  [id number]  1970  female      Chief Complaint   Patient presents with   • Follow-up     GS PD 8-3-22        Post-Op Bariatric Surgery:   Jessica Mcdowell is status post Laparoscopic Sleeve/HH procedure, performed on 8/3/22     HPI:   Today's weight is 101 kg (223 lb) pounds, today's BMI is Body mass index is 33.92 kg/m².,@ has a  loss of 20 pounds since the last visit and@ weight loss since surgery is 53 pounds. The patient reports a decreased portion size and loss of appetite.      Jessica Mcdowell denies nausea, vomiting, relfux and reports that she is doing well. She isn't often hungry. She has been mixing a liquid protein supplement in with some crystal lite to help her get protein in. She has gotten away from protein shakes. She reports that premier protein was recalled so she stopped using that. She was doing some slimfast shakes but they were making her feel full. If she takes something to work with take tangerines. She will snack on pistachios. She has been doing more fish for dinner and has been trying to do some beans. She reports that she still feels that chicken is too heavy     Diet and Exercise: Diet history reviewed and discussed with the patient. Weight loss/gains to date discussed with the patient. The patient states they are eating 36 grams of protein per day. She reports eating 3 meals per day, a typical portion size of 1/2 cup, eating 1 snacks per day, drinking 5-6 or more 8-oz. glasses of water per day, no carbonated beverage consumption and exercising regularly- walking 5 days per week.    Supplements: celebrate MTV with iron and calcium.     Review of Systems   Constitutional: Positive for appetite change. Negative for  fatigue and unexpected weight change.   HENT: Negative.    Eyes: Negative.    Respiratory: Negative.    Cardiovascular: Negative.  Negative for leg swelling.   Gastrointestinal: Negative for abdominal distention, abdominal pain, constipation, diarrhea, nausea and vomiting.   Genitourinary: Negative for difficulty urinating, frequency and urgency.   Musculoskeletal: Negative for back pain.   Skin: Negative.    Psychiatric/Behavioral: Negative.    All other systems reviewed and are negative.      Patient Active Problem List   Diagnosis   • Iron deficiency anemia secondary to inadequate dietary iron intake   • HTN (hypertension)   • Seasonal allergies   • Chronic fatigue   • Heart murmur   • Asthma   • IBS (irritable bowel syndrome)   • Hemorrhoids   • History of kidney stones   • Chronic pain of both knees   • Rheumatoid arthritis (HCC)   • Gout   • Prediabetes   • History of heart attack   • Localized edema   • Dietary counseling   • Left leg swelling   • Hiatal hernia   • Obesity, Class I, BMI 30-34.9   • S/P laparoscopic sleeve gastrectomy   • Constipation   • Fluid retention       Past Medical History:   Diagnosis Date   • Asthma    • Eczema    • History of anemia    • History of gout    • History of hemorrhoids    • Hypertension    • Kidney stone     history   • Leiomyoma    • Murmur, heart    • PONV (postoperative nausea and vomiting)    • Rheumatoid arthritis (HCC) 02/16/2022       The following portions of the patient's history were reviewed and updated as appropriate: allergies, current medications, past family history, past medical history, past social history, past surgical history and problem list.    Vitals:    11/09/22 0908   BP: 135/92   Pulse: 58   Temp: 97.5 °F (36.4 °C)       Physical Exam  Vitals and nursing note reviewed.   Constitutional:       Appearance: She is well-developed.   Neck:      Thyroid: No thyromegaly.   Cardiovascular:      Rate and Rhythm: Normal rate.   Pulmonary:      Effort:  Pulmonary effort is normal. No respiratory distress.   Abdominal:      Palpations: Abdomen is soft.   Musculoskeletal:         General: No tenderness.   Skin:     General: Skin is warm and dry.   Neurological:      Mental Status: She is alert.   Psychiatric:         Behavior: Behavior normal.         Assessment:   Post-op, the patient is doing well.     Encounter Diagnoses   Name Primary?   • Obesity, Class I, BMI 30-34.9 Yes   • S/P laparoscopic sleeve gastrectomy    • Primary hypertension    • Hiatal hernia    • Mild intermittent asthma without complication        Plan:   Encouraged patient to be sure to get plenty of lean protein per day through small frequent meals all with a protein source.   Activity restrictions: none.   Recommended patient be sure to get at least 70 grams of protein per day by eating small, frequent meals all with high lean protein choices. Be sure to limit/cut back on daily carbohydrate intake. Discussed with the patient the recommended amount of water per day to intake- half of body weight in ounces. Reviewed vitamin requirements. Be sure to do routine exercise, 150 minutes per week minimum, including both cardio and strength training.     Instructions / Recommendations: dietary counseling recommended, recommended a daily protein intake of  grams, vitamin supplement(s) recommended, recommended exercising at least 150 minutes per week, behavior modifications recommended and instructed to call the office for concerns, questions, or problems.     The patient was instructed to follow up in 3 months .    Total time spent during this encounter today was 25 miinutes

## 2022-11-16 PROBLEM — E66.812 OBESITY, CLASS II, BMI 35-39.9: Status: RESOLVED | Noted: 2022-08-10 | Resolved: 2022-11-16

## 2022-11-16 PROBLEM — E66.9 OBESITY, CLASS II, BMI 35-39.9: Status: RESOLVED | Noted: 2022-08-10 | Resolved: 2022-11-16

## 2022-11-16 PROBLEM — E66.811 OBESITY, CLASS I, BMI 30-34.9: Status: ACTIVE | Noted: 2022-08-10

## 2022-11-18 ENCOUNTER — HOSPITAL ENCOUNTER (EMERGENCY)
Facility: HOSPITAL | Age: 52
Discharge: HOME | End: 2022-11-18
Attending: EMERGENCY MEDICINE | Admitting: EMERGENCY MEDICINE

## 2022-11-18 ENCOUNTER — APPOINTMENT (OUTPATIENT)
Dept: GENERAL RADIOLOGY | Facility: HOSPITAL | Age: 52
End: 2022-11-18

## 2022-11-18 VITALS
TEMPERATURE: 96.5 F | BODY MASS INDEX: 33.34 KG/M2 | HEIGHT: 68 IN | RESPIRATION RATE: 14 BRPM | SYSTOLIC BLOOD PRESSURE: 136 MMHG | HEART RATE: 52 BPM | DIASTOLIC BLOOD PRESSURE: 93 MMHG | WEIGHT: 220 LBS | OXYGEN SATURATION: 100 %

## 2022-11-18 DIAGNOSIS — M54.12 CERVICAL RADICULOPATHY: Primary | ICD-10-CM

## 2022-11-18 LAB — QT INTERVAL: 399 MS

## 2022-11-18 PROCEDURE — 72050 X-RAY EXAM NECK SPINE 4/5VWS: CPT

## 2022-11-18 PROCEDURE — 93005 ELECTROCARDIOGRAM TRACING: CPT | Performed by: EMERGENCY MEDICINE

## 2022-11-18 PROCEDURE — 99283 EMERGENCY DEPT VISIT LOW MDM: CPT

## 2022-11-18 PROCEDURE — 93005 ELECTROCARDIOGRAM TRACING: CPT

## 2022-11-18 PROCEDURE — 93010 ELECTROCARDIOGRAM REPORT: CPT | Performed by: INTERNAL MEDICINE

## 2022-11-18 PROCEDURE — 63710000001 PREDNISONE PER 1 MG: Performed by: EMERGENCY MEDICINE

## 2022-11-18 RX ORDER — HYDROCODONE BITARTRATE AND ACETAMINOPHEN 5; 325 MG/1; MG/1
1 TABLET ORAL ONCE
Status: COMPLETED | OUTPATIENT
Start: 2022-11-18 | End: 2022-11-18

## 2022-11-18 RX ORDER — OXYCODONE HYDROCHLORIDE AND ACETAMINOPHEN 5; 325 MG/1; MG/1
1 TABLET ORAL ONCE
Status: DISCONTINUED | OUTPATIENT
Start: 2022-11-18 | End: 2022-11-19 | Stop reason: HOSPADM

## 2022-11-18 RX ORDER — METHOCARBAMOL 750 MG/1
750 TABLET, FILM COATED ORAL ONCE
Status: COMPLETED | OUTPATIENT
Start: 2022-11-18 | End: 2022-11-18

## 2022-11-18 RX ORDER — METHOCARBAMOL 750 MG/1
750 TABLET, FILM COATED ORAL 3 TIMES DAILY PRN
Qty: 21 TABLET | Refills: 0 | Status: SHIPPED | OUTPATIENT
Start: 2022-11-18

## 2022-11-18 RX ORDER — METHYLPREDNISOLONE 4 MG/1
TABLET ORAL
Qty: 1 EACH | Refills: 0 | Status: SHIPPED | OUTPATIENT
Start: 2022-11-18 | End: 2023-02-03

## 2022-11-18 RX ORDER — PREDNISONE 20 MG/1
40 TABLET ORAL ONCE
Status: COMPLETED | OUTPATIENT
Start: 2022-11-18 | End: 2022-11-18

## 2022-11-18 RX ADMIN — PREDNISONE 40 MG: 20 TABLET ORAL at 20:54

## 2022-11-18 RX ADMIN — HYDROCODONE BITARTRATE AND ACETAMINOPHEN 1 TABLET: 5; 325 TABLET ORAL at 20:54

## 2022-11-18 RX ADMIN — METHOCARBAMOL TABLETS 750 MG: 750 TABLET, COATED ORAL at 20:54

## 2022-11-18 NOTE — ED TRIAGE NOTES
Upper left arm pain started last week.  nki    Patient was placed in face mask during first look triage.  Patient was wearing a face mask throughout encounter.  I wore personal protective equipment throughout the encounter.  Hand hygiene was performed before and after patient encounter.

## 2022-11-19 NOTE — ED PROVIDER NOTES
MD ATTESTATION NOTE    The SILVIA and I have discussed this patient's history, physical exam, and treatment plan.  I have reviewed the documentation and personally had a face to face interaction with the patient. I affirm the documentation and agree with the treatment and plan.  The attached note describes my personal findings.    I provided a substantive portion of the care of this patient. I personally performed the physical exam, in its entirety.    Jessica Mcdowell is a 52 y.o. female who presents to the ED c/o left arm and left neck pain that is been going on for the last 2 weeks.  She states it hurts constantly.  It never goes away.  It seems to radiate down her left arm.  She has no chest pain or shortness of breath.  She states it seems to start in her neck.  She has no weakness or numbness.  She denies any injury.  Certain movements of her neck seem to make it worse.  Other movements seem to make it better.  It is worse once he is sleeping.      On exam:  GENERAL: Awake, alert, no acute distress  SKIN: Warm, dry  HENT: Normocephalic, atraumatic  EYES: no scleral icterus  CV: regular rhythm, regular rate  RESPIRATORY: normal effort, lungs clear  ABDOMEN: soft, nontender, nondistended  MUSCULOSKELETAL: no deformity, upper extremity pulses, motor, sensation intact.  Tenderness along left trapezius.  NEURO: alert, moves all extremities, follows commands    Labs  Recent Results (from the past 24 hour(s))   ECG 12 Lead Other; arm pain    Collection Time: 11/18/22  7:02 PM   Result Value Ref Range    QT Interval 399 ms       Radiology  XR Spine Cervical Complete 4 or 5 View    Result Date: 11/18/2022  CERVICAL SPINE: AP LATERAL BILATERAL OBLIQUE, OPEN-MOUTH  HISTORY: Left-sided neck pain with radicular symptoms down the arm for a few weeks.  COMPARISON: Cervical spine 08/07/2022.  FINDINGS: There is straightening of the cervical spine. There are mild changes of degenerative disc disease at C3-C4, C4-C5 and C7-T1  where there is anterior endplate spur formation. There is no evidence for fracture or malalignment. The prevertebral soft tissues appear normal.      Straightening of the cervical spine. Mild degenerative disc disease. No evidence for fracture or malalignment.  This report was finalized on 11/18/2022 10:05 PM by Dr. Corey Roe M.D.        Medical Decision Making:  ED Course as of 11/19/22 0300   Fri Nov 18, 2022 2156 X-ray C-spine does show spurring C5 level as well as the C7-T1 level.  Clinical symptoms are most consistent with a problem at the C5 level.  There are no deficits.  We will treat as cervical radiculopathy with Medrol and a muscle relaxer.  We will have the patient follow-up with the PCP in a week's time for repeat evaluation and perhaps MRI can be obtained if deemed fit.  Should the problem be long-term and continuous we will provide neurosurgery consultation. [RC]   2158 Have the patient return to the emergency department with worsening symptoms, fever, new symptoms, should her symptoms not be controlled or improved with Medrol and Robaxin. [RC]      ED Course User Index  [RC] Devon Phillip III, PA       Clinically the patient seems to have a cervical radiculopathy.  Plan to image her cervical spine to rule out any fracture, mass, or bony abnormality.  We will give her pain medicine and steroids to help decrease her symptoms.  She is neurovascularly intact.    Procedures:  Procedures      PPE: The patient wore a mask and I wore an N95 mask throughout the entire patient encounter.      The patient has started, but not completed, their COVID-19 vaccination series.    Diagnosis  Final diagnoses:   Cervical radiculopathy       Note Disclaimer: At [facility], we believe that sharing information builds trust and better relationships. You are receiving this note because you recently visited [facility]. It is possible you will see health information before a provider has talked with  you about it. This kind of information can be easy to misunderstand. To help you fully understand what it means for your health, we urge you to discuss this note with your provider.     Mau Harris MD  11/19/22 3219

## 2022-11-19 NOTE — DISCHARGE INSTRUCTIONS
Return to the emergency department with worsening symptoms, new symptoms, pain not controlled with medication prescribed, fever/chills, or should she have any further concerns.  I placed an ambulatory referral to an internal medicine physician.  In the next few days someone from [facility] should be getting in touch with you to help get this scheduled.  I would follow-up in the next 1 to 2 weeks to ensure complete resolve of symptoms and no further work-up is needed.  Alternatively, should this become a long drawn out chronic process I have also given you a referral to our neurosurgery department for further evaluation.

## 2022-11-19 NOTE — ED PROVIDER NOTES
EMERGENCY DEPARTMENT ENCOUNTER    Room Number:  B04/04  Date of encounter:  11/19/2022  PCP: Provider, No Known  Historian: Patient      HPI:  Chief Complaint: Left arm pain/neck pain  A complete HPI/ROS/PMH/PSH/SH/FH are unobtainable due to: Nothing    Context: Jessica Mcdowell is a 52 y.o. female who presents to the ED c/o left arm and neck pain.  Symptoms began approximately 2 weeks ago.  They have waxed and waned in severity during that time.  At present pain is said to be moderate to significant depending on whether she is moving her neck.  Nothing makes the pain better or worse.  She denies associated chest pain, shortness of breath, arm swelling, fever, chills, IVD use, remote history of cancer.  She is here for further evaluation.    Viewed her chart she does have a past she is followed by Dr. Saleh for an abnormal EKG.      PAST MEDICAL HISTORY  Active Ambulatory Problems     Diagnosis Date Noted   • Iron deficiency anemia secondary to inadequate dietary iron intake 05/22/2012   • HTN (hypertension) 12/29/2020   • Seasonal allergies 02/16/2022   • Chronic fatigue 02/16/2022   • Heart murmur 02/16/2022   • Asthma 02/16/2022   • IBS (irritable bowel syndrome) 02/16/2022   • Hemorrhoids 02/16/2022   • History of kidney stones 02/16/2022   • Chronic pain of both knees 02/16/2022   • Rheumatoid arthritis (HCC) 02/16/2022   • Gout 02/16/2022   • Prediabetes 02/16/2022   • History of heart attack 02/16/2022   • Localized edema 02/16/2022   • Dietary counseling 02/16/2022   • Left leg swelling 04/17/2022   • Hiatal hernia 08/03/2022   • Obesity, Class I, BMI 30-34.9 08/10/2022   • S/P laparoscopic sleeve gastrectomy 08/10/2022   • Constipation 09/09/2022   • Fluid retention 09/14/2022     Resolved Ambulatory Problems     Diagnosis Date Noted   • Abnormal uterine bleeding 01/15/2018   • Weight gain 02/16/2022   • Obesity, Class III, BMI 40-49.9 (morbid obesity) (HCC) 02/16/2022   • Preoperative testing  2022     Past Medical History:   Diagnosis Date   • Eczema    • History of anemia    • History of gout    • History of hemorrhoids    • Hypertension    • Kidney stone    • Leiomyoma    • Murmur, heart    • PONV (postoperative nausea and vomiting)          PAST SURGICAL HISTORY  Past Surgical History:   Procedure Laterality Date   • ABDOMINOPLASTY     •  SECTION      x2   • COLONOSCOPY     • ENDOSCOPY     • GASTRIC SLEEVE LAPAROSCOPIC N/A 2022    Procedure: GASTRIC SLEEVE LAPAROSCOPIC OR ROBOTIC, HIATAL HERNIA REPAIR;  Surgeon: Randal Calix Jr., MD;  Location: [facility], [place];  Service: Robotics - DaVinci;  Laterality: N/A;   • HEMORRHOIDECTOMY     • HERNIA REPAIR     • HYSTERECTOMY SUPRACERVICAL Bilateral 01/15/2018    Procedure: LAPAROSCOPIC SUPRACERVICAL HYSTERECTOMY WITH DAVINCI ROBOT BILATERAL SALPINGECTOMY;  Surgeon: Brigitte Roldan MD;  Location: [facility];  Service:    • LIPOMA EXCISION      back   • WISDOM TOOTH EXTRACTION      THREE         FAMILY HISTORY  Family History   Adopted: Yes   Problem Relation Age of Onset   • Malig Hyperthermia Neg Hx          SOCIAL HISTORY  Social History     Socioeconomic History   • Marital status: Single   Tobacco Use   • Smoking status: Never   • Smokeless tobacco: Never   Vaping Use   • Vaping Use: Never used   Substance and Sexual Activity   • Alcohol use: Yes     Comment: MONTHLY 1 DRINKS   • Drug use: Never   • Sexual activity: Defer     Birth control/protection: Condom, Surgical         ALLERGIES  Iodine and Shellfish-derived products        REVIEW OF SYSTEMS  Review of Systems   Constitutional: Negative for chills and fever.   Respiratory: Negative for cough and shortness of breath.    Cardiovascular: Negative for chest pain and palpitations.   Gastrointestinal: Negative.    Genitourinary: Positive for flank pain.   Musculoskeletal: Positive for back pain.   Neurological: Negative.    Psychiatric/Behavioral: Negative.         All systems  reviewed and negative except for those discussed in HPI.       PHYSICAL EXAM    I have reviewed the triage vital signs and nursing notes.    ED Triage Vitals [11/18/22 1841]   Temp Heart Rate Resp BP SpO2   96.5 °F (35.8 °C) 75 16 134/91 100 %      Temp src Heart Rate Source Patient Position BP Location FiO2 (%)   Tympanic Monitor -- -- --       Physical Exam  GENERAL: Well-nourished, nontoxic, no acute distress  HENT: nares patent  EYES: no scleral icterus  CV: regular rhythm, regular rate, radial pulses +2 bilaterally  RESPIRATORY: normal effort normal S1-S2  ABDOMEN: soft  MUSCULOSKELETAL: no deformity.  Neck: Positive Spurling's test on the left radicular symptoms in the vicinity of C5/C6.  NEURO: alert, moves all extremities, follows commands  SKIN: warm, dry        LAB RESULTS  Recent Results (from the past 24 hour(s))   ECG 12 Lead Other; arm pain    Collection Time: 11/18/22  7:02 PM   Result Value Ref Range    QT Interval 399 ms       Ordered the above labs and independently reviewed the results.        RADIOLOGY  XR Spine Cervical Complete 4 or 5 View    Result Date: 11/18/2022  CERVICAL SPINE: AP LATERAL BILATERAL OBLIQUE, OPEN-MOUTH  HISTORY: Left-sided neck pain with radicular symptoms down the arm for a few weeks.  COMPARISON: Cervical spine 08/07/2022.  FINDINGS: There is straightening of the cervical spine. There are mild changes of degenerative disc disease at C3-C4, C4-C5 and C7-T1 where there is anterior endplate spur formation. There is no evidence for fracture or malalignment. The prevertebral soft tissues appear normal.      Straightening of the cervical spine. Mild degenerative disc disease. No evidence for fracture or malalignment.  This report was finalized on 11/18/2022 10:05 PM by Dr. Corey Roe M.D.        I ordered the above noted radiological studies. Reviewed by me and discussed with radiologist.  See dictation for official radiology  interpretation.      PROCEDURES    Procedures      MEDICATIONS GIVEN IN ER    Medications   predniSONE (DELTASONE) tablet 40 mg (40 mg Oral Given 11/18/22 2054)   HYDROcodone-acetaminophen (NORCO) 5-325 MG per tablet 1 tablet (1 tablet Oral Given 11/18/22 2054)   methocarbamol (ROBAXIN) tablet 750 mg (750 mg Oral Given 11/18/22 2054)         PROGRESS, DATA ANALYSIS, CONSULTS, AND MEDICAL DECISION MAKING    All labs have been independently reviewed by me.  All radiology studies have been reviewed by me and discussed with radiologist dictating the report.   EKG's independently viewed and interpreted by me.  Discussion below represents my analysis of pertinent findings related to patient's condition, differential diagnosis, treatment plan and final disposition.    DDx: Cervical radiculopathy, thoracic outlet syndrome, other nerve entrapment, ACS.  Clinical picture most consistent with cervical radiculopathy.  Will obtain x-rays at the patient's request to evaluate the integrity of the C-spine disc space.    ED Course as of 11/19/22 0510 Fri Nov 18, 2022 2156 X-ray C-spine does show spurring C5 level as well as the C7-T1 level.  Clinical symptoms are most consistent with a problem at the C5 level.  There are no deficits.  We will treat as cervical radiculopathy with Medrol and a muscle relaxer.  We will have the patient follow-up with the PCP in a week's time for repeat evaluation and perhaps MRI can be obtained if deemed fit.  Should the problem be long-term and continuous we will provide neurosurgery consultation. [RC]   2158 Have the patient return to the emergency department with worsening symptoms, fever, new symptoms, should her symptoms not be controlled or improved with Medrol and Robaxin. [RC]      ED Course User Index  [RC] Devon Phillip III, PA           PPE: The patient wore a surgical mask throughout the entire patient encounter. I wore an N95.    AS OF 05:10 EST VITALS:    BP - 136/93  HR -  52  TEMP - 96.5 °F (35.8 °C) (Tympanic)  O2 SATS - 100%        DIAGNOSIS  Final diagnoses:   Cervical radiculopathy         DISPOSITION  DISCHARGE    Patient discharged in stable condition.    Reviewed implications of results, diagnosis, meds, responsibility to follow up, warning signs and symptoms of possible worsening, potential complications and reasons to return to ER.    Patient/Family voiced understanding of above instructions.    Discussed plan for discharge, as there is no emergent indication for admission. Patient referred to primary care provider for BP management due to today's BP. Pt/family is agreeable and understands need for follow up and repeat testing.  Pt is aware that discharge does not mean that nothing is wrong but it indicates no emergency is present that requires admission and they must continue care with follow-up as given below or physician of their choice.     FOLLOW-UP  [facility] HOSPITALIST PROVIDER  [address] [address] [phone]        [facility] NEUROSURGERY  [address] [facility]  [address]  [facility] [address] [phone]  Schedule an appointment as soon as possible for a visit   For further evaluation and treatment if symptoms become chronic and persist         Medication List      New Prescriptions    methocarbamol 750 MG tablet  Commonly known as: ROBAXIN  Take 1 tablet by mouth 3 (Three) Times a Day As Needed for Muscle Spasms.     methylPREDNISolone 4 MG dose pack  Commonly known as: MEDROL  Take as directed on package instructions.           Where to Get Your Medications      These medications were sent to [id number] - [place], [place] - [address] AT [facility] [phone] [facility] [phone] [address] [address]    Phone: [phone]   · methocarbamol 750 MG tablet  · methylPREDNISolone 4 MG dose pack              Devon Phillip III,  PA  11/19/22 0510

## 2023-02-03 ENCOUNTER — OFFICE VISIT (OUTPATIENT)
Dept: BARIATRICS/WEIGHT MGMT | Facility: CLINIC | Age: 53
End: 2023-02-03
Payer: COMMERCIAL

## 2023-02-03 VITALS
HEART RATE: 92 BPM | TEMPERATURE: 98 F | BODY MASS INDEX: 32.58 KG/M2 | HEIGHT: 68 IN | WEIGHT: 215 LBS | DIASTOLIC BLOOD PRESSURE: 95 MMHG | SYSTOLIC BLOOD PRESSURE: 145 MMHG

## 2023-02-03 DIAGNOSIS — I10 PRIMARY HYPERTENSION: ICD-10-CM

## 2023-02-03 DIAGNOSIS — R53.82 CHRONIC FATIGUE: ICD-10-CM

## 2023-02-03 DIAGNOSIS — Z98.84 S/P LAPAROSCOPIC SLEEVE GASTRECTOMY: ICD-10-CM

## 2023-02-03 DIAGNOSIS — E66.9 OBESITY, CLASS I, BMI 30-34.9: Primary | ICD-10-CM

## 2023-02-03 DIAGNOSIS — K44.9 HIATAL HERNIA: ICD-10-CM

## 2023-02-03 PROCEDURE — 99213 OFFICE O/P EST LOW 20 MIN: CPT | Performed by: NURSE PRACTITIONER

## 2023-02-03 NOTE — PROGRESS NOTES
[facility] BARIATRIC SURGERY  [address] [facility] [address]  [facility] [address]  [phone]  [address] [address] [phone]  Dept: [phone]  2/3/2023      Jessica Mcdowell.  [id number]  [id number]  1970  female      Chief Complaint   Patient presents with   • Follow-up     6 mo fu sleeve       BH Post-Op Bariatric Surgery:   Jessica Mcdowell is status post Laparoscopic Sleeve procedure, performed on 8/3/22     HPI:   Today's weight is 97.5 kg (215 lb) pounds, today's BMI is Body mass index is 32.7 kg/m².,@ has a  loss of 8 pounds since the last visit and@ weight loss since surgery is 16 pounds. The patient reports a decreased portion size and loss of appetite.      Jessica Mcdowell denies nausea, vomiting, reflux and reports that she is having a hard time tolerating chicken. She doesn't have vomiting. She does tolerate fish well. She doesn't often have the appetite for dinner. She may have greek yogurt. She often is too full with liquids to eat a solid lunch. Will snack on fruit or yogurt or a hard boiled egg. She has a fusion shake in the mornings for protein. She was doing some coffee but did notice more sugar cravings so has gotten away from that.     She reports ongoing left upper shoulder pain.      Diet and Exercise: Diet history reviewed and discussed with the patient. Weight loss/gains to date discussed with the patient. The patient states they are eating 40-50 grams of protein per day. She reports eating 3 meals per day, a typical portion size of 1/2 cup, eating 1 snacks per day, drinking 5-6 or more 8-oz. glasses of water per day, no carbonated beverage consumption and exercising regularly.     Supplements: celebrate [employer] with iron.     Review of Systems   Constitutional: Positive for appetite change. Negative for fatigue and unexpected weight change.   HENT: Negative.    Eyes: Negative.    Respiratory: Negative.     Cardiovascular: Negative.  Negative for leg swelling.   Gastrointestinal: Negative for abdominal distention, abdominal pain, constipation, diarrhea, nausea and vomiting.   Genitourinary: Negative for difficulty urinating, frequency and urgency.   Musculoskeletal: Negative for back pain.   Skin: Negative.    Psychiatric/Behavioral: Negative.    All other systems reviewed and are negative.      Patient Active Problem List   Diagnosis   • Iron deficiency anemia secondary to inadequate dietary iron intake   • HTN (hypertension)   • Seasonal allergies   • Chronic fatigue   • Heart murmur   • Asthma   • IBS (irritable bowel syndrome)   • Hemorrhoids   • History of kidney stones   • Chronic pain of both knees   • Rheumatoid arthritis (HCC)   • Gout   • Prediabetes   • History of heart attack   • Localized edema   • Dietary counseling   • Left leg swelling   • Hiatal hernia   • Obesity, Class I, BMI 30-34.9   • S/P laparoscopic sleeve gastrectomy   • Constipation   • Fluid retention       Past Medical History:   Diagnosis Date   • Asthma    • Eczema    • History of anemia    • History of gout    • History of hemorrhoids    • Hypertension    • Kidney stone     history   • Leiomyoma    • Murmur, heart    • PONV (postoperative nausea and vomiting)    • Rheumatoid arthritis (HCC) 02/16/2022       The following portions of the patient's history were reviewed and updated as appropriate: allergies, current medications, past family history, past medical history, past social history, past surgical history and problem list.    Vitals:    02/03/23 1336   BP: 145/95   Pulse: 92   Temp: 98 °F (36.7 °C)       Physical Exam  Vitals and nursing note reviewed.   Constitutional:       Appearance: She is well-developed.   Neck:      Thyroid: No thyromegaly.   Cardiovascular:      Rate and Rhythm: Normal rate.   Pulmonary:      Effort: Pulmonary effort is normal. No respiratory distress.   Abdominal:      Palpations: Abdomen is soft.    Musculoskeletal:         General: No tenderness.   Skin:     General: Skin is warm and dry.   Neurological:      Mental Status: She is alert.   Psychiatric:         Behavior: Behavior normal.         Assessment:   Post-op, the patient is doing well.     Encounter Diagnoses   Name Primary?   • Obesity, Class I, BMI 30-34.9 Yes   • S/P laparoscopic sleeve gastrectomy    • Primary hypertension    • Hiatal hernia    • Chronic fatigue        Plan:   Encouraged patient to be sure to get plenty of lean protein per day through small frequent meals all with a protein source.   Activity restrictions: none.   Recommended patient be sure to get at least 70 grams of protein per day by eating small, frequent meals all with high lean protein choices. Be sure to limit/cut back on daily carbohydrate intake. Discussed with the patient the recommended amount of water per day to intake- half of body weight in ounces. Reviewed vitamin requirements. Be sure to do routine exercise, 150 minutes per week minimum, including both cardio and strength training.     Instructions / Recommendations: dietary counseling recommended, recommended a daily protein intake of  grams, vitamin supplement(s) recommended, recommended exercising at least 150 minutes per week, behavior modifications recommended and instructed to call the office for concerns, questions, or problems.     The patient was instructed to follow up in 3 months .    Total time spent during this encounter today was 25 minutes

## 2023-05-04 ENCOUNTER — OFFICE VISIT (OUTPATIENT)
Dept: BARIATRICS/WEIGHT MGMT | Facility: CLINIC | Age: 53
End: 2023-05-04
Payer: COMMERCIAL

## 2023-05-04 VITALS
HEART RATE: 74 BPM | WEIGHT: 219 LBS | SYSTOLIC BLOOD PRESSURE: 145 MMHG | BODY MASS INDEX: 33.19 KG/M2 | HEIGHT: 68 IN | DIASTOLIC BLOOD PRESSURE: 90 MMHG | TEMPERATURE: 97.5 F

## 2023-05-04 DIAGNOSIS — E66.9 OBESITY, CLASS I, BMI 30-34.9: Primary | ICD-10-CM

## 2023-05-04 DIAGNOSIS — R60.0 LOCALIZED EDEMA: ICD-10-CM

## 2023-05-04 DIAGNOSIS — Z98.84 S/P LAPAROSCOPIC SLEEVE GASTRECTOMY: ICD-10-CM

## 2023-05-04 DIAGNOSIS — R73.03 PREDIABETES: ICD-10-CM

## 2023-05-04 DIAGNOSIS — I10 PRIMARY HYPERTENSION: ICD-10-CM

## 2023-05-04 DIAGNOSIS — K44.9 HIATAL HERNIA: ICD-10-CM

## 2023-05-04 RX ORDER — PHENTERMINE AND TOPIRAMATE 7.5; 46 MG/1; MG/1
1 CAPSULE, EXTENDED RELEASE ORAL DAILY
Qty: 76 CAPSULE | Refills: 0 | Status: SHIPPED | OUTPATIENT
Start: 2023-05-04

## 2023-05-04 RX ORDER — PHENTERMINE AND TOPIRAMATE 3.75; 23 MG/1; MG/1
1 CAPSULE, EXTENDED RELEASE ORAL DAILY
Qty: 14 CAPSULE | Refills: 0 | Status: SHIPPED | OUTPATIENT
Start: 2023-05-04

## 2023-05-04 NOTE — PROGRESS NOTES
[facility] BARIATRIC SURGERY  [address] [facility] [address]  [facility] [address]  [phone]  [address] [address]  [phone]  Dept: [phone]  5/4/2023      Jessica Mcdowell.  [id number]  [id number]  1970  female      Chief Complaint   Patient presents with   • Follow-up     9 mo fu sleeve        BH Post-Op Bariatric Surgery:   Jessica Mcdowell is status post Laparoscopic Sleeve procedure, performed on 8/3/22     HPI:   Today's weight is 99.3 kg (219 lb) pounds, today's BMI is Body mass index is 33.31 kg/m².,@ has a  gain of 4 pounds since the last visit and@ weight loss since surgery is 59 pounds. The patient reports a decreased portion size and loss of appetite.      Jessica Mcdowell denies nausea, vomiting, reflux and reports that she is having more sugar cravings. She reports that she is caving into that more. She feels like she has been off track since thanksgiving. She reports that after losing power she had to throw out all of her groceries.    Diet recall: will eat something light to to be able to take her vitamin with iron (breakfast sausage), protein clear drink or protein shake around lunchtime, she reports that she rarely feels hungry enough to eat a plate of food or a solid dinner. May have cheetos or pistachios. She works two jobs and is so busy that she doesn't feel that she has time to prep a solid dinner or sit down and eat. She is considering going vegan. She notes more sugar cravings when she is sitting at home without a lot to do.      She reports feeling hungry once or twice per week.     Supplements: celebrate.     Review of Systems   Constitutional: Positive for appetite change. Negative for fatigue and unexpected weight change.   HENT: Negative.    Eyes: Negative.    Respiratory: Negative.    Cardiovascular: Negative.  Negative for leg swelling.   Gastrointestinal: Positive for constipation. Negative for  abdominal distention, abdominal pain, diarrhea, nausea and vomiting.   Genitourinary: Negative for difficulty urinating, frequency and urgency.   Musculoskeletal: Negative for back pain.   Skin: Negative.    Psychiatric/Behavioral: Negative.    All other systems reviewed and are negative.      Patient Active Problem List   Diagnosis   • Iron deficiency anemia secondary to inadequate dietary iron intake   • HTN (hypertension)   • Seasonal allergies   • Chronic fatigue   • Heart murmur   • Asthma   • IBS (irritable bowel syndrome)   • Hemorrhoids   • History of kidney stones   • Chronic pain of both knees   • Rheumatoid arthritis   • Gout   • Prediabetes   • History of heart attack   • Localized edema   • Dietary counseling   • Left leg swelling   • Hiatal hernia   • Obesity, Class I, BMI 30-34.9   • S/P laparoscopic sleeve gastrectomy   • Constipation   • Fluid retention       Past Medical History:   Diagnosis Date   • Asthma    • Eczema    • History of anemia    • History of gout    • History of hemorrhoids    • Hypertension    • Kidney stone     history   • Leiomyoma    • Murmur, heart    • PONV (postoperative nausea and vomiting)    • Rheumatoid arthritis 02/16/2022       The following portions of the patient's history were reviewed and updated as appropriate: allergies, current medications, past family history, past medical history, past social history, past surgical history and problem list.    Vitals:    05/04/23 1406   BP: 145/90   Pulse: 74   Temp: 97.5 °F (36.4 °C)       Physical Exam  Vitals and nursing note reviewed.   Constitutional:       Appearance: She is well-developed.   Neck:      Thyroid: No thyromegaly.   Cardiovascular:      Rate and Rhythm: Normal rate.   Pulmonary:      Effort: Pulmonary effort is normal. No respiratory distress.   Abdominal:      Palpations: Abdomen is soft.   Musculoskeletal:         General: No tenderness.   Skin:     General: Skin is warm and dry.   Neurological:       Mental Status: She is alert.   Psychiatric:         Behavior: Behavior normal.         Assessment:   Post-op, the patient is frustrated with stall in her weight loss. She is struggling with constipation and sugar cravings.     Encounter Diagnoses   Name Primary?   • Obesity, Class I, BMI 30-34.9 Yes   • Primary hypertension    • S/P laparoscopic sleeve gastrectomy    • Localized edema    • Prediabetes    • Hiatal hernia        Plan:   Patient would like to try an adjunct therapy to help with cravings. We discussed how simple carbs and alcohol intake cause marked sugar spikes which will lead to increased sugar cravings. Goal to focus on getting at least 40g of protein daily from real food, may supplement to get to goal with one shake per day. Add a fiber supplement to help regulate bowel regimen. Discussed starting topirimate to help with cravings, patient would prefer to start qysmia. I am amenable as long as she can commit to increasing whole foods and solid protein in her diet.   Encouraged patient to be sure to get plenty of lean protein per day through small frequent meals all with a protein source.   Activity restrictions: none.   Recommended patient be sure to get at least 70 grams of protein per day by eating small, frequent meals all with high lean protein choices. Be sure to limit/cut back on daily carbohydrate intake. Discussed with the patient the recommended amount of water per day to intake- half of body weight in ounces. Reviewed vitamin requirements. Be sure to do routine exercise, 150 minutes per week minimum, including both cardio and strength training.     Instructions / Recommendations: dietary counseling recommended, recommended a daily protein intake of  grams, vitamin supplement(s) recommended, recommended exercising at least 150 minutes per week, behavior modifications recommended and instructed to call the office for concerns, questions, or problems.     The patient was instructed to  follow up in 3 months .      Total time spent during this encounter today was 35 minutes

## 2023-05-12 RX ORDER — LISINOPRIL 5 MG/1
5 TABLET ORAL EVERY MORNING
Qty: 90 TABLET | OUTPATIENT
Start: 2023-05-12

## 2023-09-18 RX ORDER — LISINOPRIL 5 MG/1
5 TABLET ORAL EVERY MORNING
Qty: 30 TABLET | Refills: 0 | Status: SHIPPED | OUTPATIENT
Start: 2023-09-18

## 2023-10-10 ENCOUNTER — OFFICE VISIT (OUTPATIENT)
Dept: CARDIOLOGY | Facility: CLINIC | Age: 53
End: 2023-10-10
Payer: COMMERCIAL

## 2023-10-10 VITALS
HEIGHT: 68 IN | BODY MASS INDEX: 35.77 KG/M2 | WEIGHT: 236 LBS | HEART RATE: 68 BPM | DIASTOLIC BLOOD PRESSURE: 90 MMHG | SYSTOLIC BLOOD PRESSURE: 140 MMHG

## 2023-10-10 DIAGNOSIS — I10 PRIMARY HYPERTENSION: ICD-10-CM

## 2023-10-10 DIAGNOSIS — R60.9 FLUID RETENTION: ICD-10-CM

## 2023-10-10 DIAGNOSIS — R01.1 HEART MURMUR: Primary | ICD-10-CM

## 2023-10-10 PROCEDURE — 93000 ELECTROCARDIOGRAM COMPLETE: CPT | Performed by: NURSE PRACTITIONER

## 2023-10-10 PROCEDURE — 99213 OFFICE O/P EST LOW 20 MIN: CPT | Performed by: NURSE PRACTITIONER

## 2023-10-10 RX ORDER — INDAPAMIDE 2.5 MG/1
2.5 TABLET ORAL DAILY PRN
Qty: 30 TABLET | Refills: 6 | Status: SHIPPED | OUTPATIENT
Start: 2023-10-10

## 2023-10-10 RX ORDER — LISINOPRIL 10 MG/1
10 TABLET ORAL DAILY
Qty: 30 TABLET | Refills: 11 | Status: SHIPPED | OUTPATIENT
Start: 2023-10-10

## 2023-10-10 NOTE — PROGRESS NOTES
Subjective:        Jessica Mcdowell is a 53 y.o. female who here for follow up    Chief Complaint   Patient presents with    Follow-up     1YR        HPI  This is a 53-year-old female who is current with this provider.  She has a history to include hypertension, laparoscopic sleeve gastrectomy, murmur, rheumatoid arthritis, anemia, history of gout, hemorrhoids, and kidney stones.  He is here today for a 1 year follow-up for hypertension.  EKG shows sinus rhythm with heart rate of 65.    Echo in 2022 revealed EF 64.6%, LV diastolic function was normal and no evidence of pericardial effusion.  Normal stress test March 2022.    The following portions of the patient's history were reviewed and updated as appropriate: allergies, current medications, past family history, past medical history, past social history, past surgical history and problem list.    Past Medical History:   Diagnosis Date    Asthma     Eczema     History of anemia     History of gout     History of hemorrhoids     Hypertension     Kidney stone     history    Leiomyoma     Murmur, heart     PONV (postoperative nausea and vomiting)     Rheumatoid arthritis 02/16/2022         reports that she has never smoked. She has never used smokeless tobacco. She reports current alcohol use. She reports that she does not use drugs.     Family History   Adopted: Yes   Problem Relation Age of Onset    Malig Hyperthermia Neg Hx        ROS     Review of Systems  Constitutional: No wt loss, fever, fatigue  Gastrointestinal: No nausea, abdominal pain  Behavioral/Psych: No insomnia or anxiety  Cardiovascular denies chest pain, and shortness of breath      Objective:           Constitutional:       Appearance: Well-developed.   Neck:      Vascular: No JVD.      Trachea: No tracheal deviation.   Pulmonary:      Effort: Pulmonary effort is normal. No respiratory distress.      Breath sounds: Normal breath sounds. No stridor. No decreased breath sounds. No wheezing. No  rhonchi. No rales.   Chest:      Chest wall: Not tender to palpatation.   Cardiovascular:      Normal rate. Regular rhythm.      No gallop.    Pulses:     Intact distal pulses.   Edema:     Peripheral edema absent.   Abdominal:      General: Bowel sounds are normal. There is no distension.      Palpations: Abdomen is soft.      Tenderness: There is no abdominal tenderness.   Skin:     General: Skin is warm.   Neurological:      Mental Status: Alert and oriented to person, place, and time.      Deep Tendon Reflexes: Reflexes are normal and symmetric.           ECG 12 Lead    Date/Time: 10/10/2023 12:19 PM  Performed by: Sarahi Jeffers APRN    Authorized by: Sarahi Jeffers APRN  Comparison: compared with previous ECG from 11/18/2022  Similar to previous ECG  Rhythm: sinus rhythm  BPM: 65    Clinical impression: non-specific ECG          Interpretation Summary       Moderate risk for ischemic heart disease.  Findings consistent with an equivocal ECG stress test.  Left ventricular ejection fraction is normal. (Calculated EF = 60%).  Myocardial perfusion imaging indicates a normal myocardial perfusion study with no evidence of ischemia.  Impressions are consistent with a low risk study.     Asymptomatic for chest pain. Specificity of study reduced secondary to baseline Non-specific ST-T wave abnormalities Inferior Leads only,  ECG is  suggestive of ischemia with  2-2.5 mm ST-T downslope depression Inferior leads and 1-2 mm ST- T downslope depression Lateral leads.  ECG is  equivocal for  suggestion of ischemia  Ectopy: rare PVC at baseline, occasional PVC's and multifocal couplets in exercise and none in recovery  Blood pressure response:  appropriate. Baseline 110/74. Peak 158/70. Recovery 168//68  Exercise Tolerance: poor, Reached 18 Of  20 Lopez Scale.  Solis treadmill score -6.7  Estimates  5-year survival of 95  % . Using the Duke score there is a  Medium risk  of angiographic coronary disease.      Supervised by: Dr. CavazosiInterpretation Summary    Calculated left ventricular EF = 64.6% Estimated left ventricular EF was in agreement with the calculated left ventricular EF.  Left ventricular diastolic function was normal.  There is no evidence of pericardial effusion. .      Current Outpatient Medications:     albuterol (ACCUNEB) 1.25 MG/3ML nebulizer solution, Take 3 mL by nebulization Every 6 (Six) Hours As Needed for Wheezing., Disp: 60 each, Rfl: 0    albuterol sulfate  (90 Base) MCG/ACT inhaler, Inhale 2 puffs Every 6 (Six) Hours As Needed for Wheezing or Shortness of Air., Disp: 6.7 g, Rfl: 0    azelastine (ASTELIN) 0.1 % nasal spray, 2 sprays into the nostril(s) as directed by provider Daily As Needed., Disp: , Rfl:     Calcium 500-100 MG-UNIT chewable tablet, Chew 3 tablets Daily., Disp: , Rfl:     cyclobenzaprine (FLEXERIL) 5 MG tablet, Take 1 tablet by mouth 3 (Three) Times a Day As Needed for Muscle Spasms., Disp: 15 tablet, Rfl: 0    fluticasone (FLONASE) 50 MCG/ACT nasal spray, 1 spray into the nostril(s) as directed by provider As Needed., Disp: , Rfl:     indapamide (LOZOL) 2.5 MG tablet, Take 1 tablet by mouth Every Morning., Disp: 30 tablet, Rfl: 6    lidocaine (Lidoderm) 5 %, Place 1 patch on the skin as directed by provider Daily. Remove & Discard patch within 12 hours or as directed by MD, Disp: 30 patch, Rfl: 0    lisinopril (PRINIVIL,ZESTRIL) 5 MG tablet, TAKE 1 TABLET BY MOUTH EVERY MORNING, Disp: 30 tablet, Rfl: 0    loratadine (CLARITIN) 10 MG tablet, Take 1 tablet by mouth Daily As Needed., Disp: , Rfl:     multivitamin with minerals tablet tablet, Take 1 tablet by mouth Daily., Disp: , Rfl:     ondansetron ODT (ZOFRAN-ODT) 4 MG disintegrating tablet, Place 1 tablet on the tongue Every 4 (Four) Hours As Needed for Nausea or Vomiting., Disp: 14 tablet, Rfl: 0    vitamin B-12 (CYANOCOBALAMIN) 100 MCG tablet, Take 1 tablet by mouth Daily. HOLD PRIOR TO SURGERY, Disp: , Rfl:      vitamin C (ASCORBIC ACID) 250 MG tablet, Take 1 tablet by mouth 2 (Two) Times a Week., Disp: , Rfl:     MAGNESIUM PO, Take 1 tablet by mouth Daily., Disp: , Rfl:     methocarbamol (ROBAXIN) 750 MG tablet, Take 1 tablet by mouth 3 (Three) Times a Day As Needed for Muscle Spasms., Disp: 21 tablet, Rfl: 0    Phentermine-Topiramate (Qsymia) 3.75-23 MG capsule sustained-release 24 hr, Take 1 tablet by mouth Daily., Disp: 14 capsule, Rfl: 0    Phentermine-Topiramate (Qsymia) 7.5-46 MG capsule sustained-release 24 hr, Take 1 tablet by mouth Daily., Disp: 76 capsule, Rfl: 0     Assessment:        Patient Active Problem List   Diagnosis    Iron deficiency anemia secondary to inadequate dietary iron intake    HTN (hypertension)    Seasonal allergies    Chronic fatigue    Heart murmur    Asthma    IBS (irritable bowel syndrome)    Hemorrhoids    History of kidney stones    Chronic pain of both knees    Rheumatoid arthritis    Gout    Prediabetes    History of heart attack    Localized edema    Dietary counseling    Left leg swelling    Hiatal hernia    Obesity, Class I, BMI 30-34.9    S/P laparoscopic sleeve gastrectomy    Constipation    Fluid retention               Plan:   1.  Hypertension: Today in the office blood pressure elevated.  I will increase lisinopril.  She will try to take less or none at all of nsaids as they can increase her blood pressure as well.    Educated patient on exercising for at least 30 minutes a day for 2 to 3 days a week. Importance of controlling hypertension and blood pressure checkup on the regular basis has been explained. Hypertension as a silent killer has been discussed. Risk reduction of the weight and regular exercises to control the hypertension has been explained.    Pros and cons of this new medication / change medication has been explained to  the patient. Possible side effects has been explained. Associated need of the blood  Work has been explained. Need for the compliance of  the medication has been explained..    2.  Heart murmur: Stable    3.  BLE swelling.  Stable. She states she no longer takes lozol but will do prn and monitor salt             No diagnosis found.    There are no diagnoses linked to this encounter.    COUNSELING: ned Sultana was given to patient for the following topics: diagnostic results, risk factor reductions, impressions, risks and benefits of treatment options and importance of treatment compliance .       SMOKING COUNSELING: denies    Increase ace. Do blood pressure diary.   Follow up in 2 months.  Refer to pcp    Sincerely,   AARTI Shaw  [facility]  10/10/23  12:17 EDT    EMR Dragon/Transcription disclaimer:   Much of this encounter note is an electronic transcription/translation of spoken language to printed text. The electronic translation of spoken language may permit erroneous, or at times, nonsensical words or phrases to be inadvertently transcribed; Although I have reviewed the note for such errors, some may still exist.

## 2023-10-25 ENCOUNTER — LAB (OUTPATIENT)
Dept: FAMILY MEDICINE CLINIC | Facility: CLINIC | Age: 53
End: 2023-10-25
Payer: COMMERCIAL

## 2023-10-25 ENCOUNTER — OFFICE VISIT (OUTPATIENT)
Dept: FAMILY MEDICINE CLINIC | Facility: CLINIC | Age: 53
End: 2023-10-25
Payer: COMMERCIAL

## 2023-10-25 VITALS
SYSTOLIC BLOOD PRESSURE: 174 MMHG | DIASTOLIC BLOOD PRESSURE: 120 MMHG | OXYGEN SATURATION: 99 % | BODY MASS INDEX: 36.98 KG/M2 | HEIGHT: 68 IN | HEART RATE: 73 BPM | WEIGHT: 244 LBS

## 2023-10-25 DIAGNOSIS — R23.2 HOT FLASHES: ICD-10-CM

## 2023-10-25 DIAGNOSIS — R73.03 PREDIABETES: ICD-10-CM

## 2023-10-25 DIAGNOSIS — N95.1 MENOPAUSAL SYMPTOMS: ICD-10-CM

## 2023-10-25 DIAGNOSIS — I10 PRIMARY HYPERTENSION: ICD-10-CM

## 2023-10-25 DIAGNOSIS — R73.03 PRE-DIABETES: ICD-10-CM

## 2023-10-25 DIAGNOSIS — E78.1 HYPERTRIGLYCERIDEMIA: Chronic | ICD-10-CM

## 2023-10-25 DIAGNOSIS — Z13.220 SCREENING CHOLESTEROL LEVEL: ICD-10-CM

## 2023-10-25 DIAGNOSIS — J45.20 MILD INTERMITTENT ASTHMA WITHOUT COMPLICATION: Chronic | ICD-10-CM

## 2023-10-25 DIAGNOSIS — R01.1 HEART MURMUR: ICD-10-CM

## 2023-10-25 DIAGNOSIS — I10 PRIMARY HYPERTENSION: Primary | Chronic | ICD-10-CM

## 2023-10-25 DIAGNOSIS — R61 NIGHT SWEATS: ICD-10-CM

## 2023-10-25 PROCEDURE — 99214 OFFICE O/P EST MOD 30 MIN: CPT | Performed by: NURSE PRACTITIONER

## 2023-10-25 RX ORDER — UREA 10 %
LOTION (ML) TOPICAL
COMMUNITY

## 2023-10-25 RX ORDER — ALBUTEROL SULFATE 90 UG/1
2 AEROSOL, METERED RESPIRATORY (INHALATION) EVERY 6 HOURS PRN
Qty: 6.7 G | Refills: 0 | Status: SHIPPED | OUTPATIENT
Start: 2023-10-25

## 2023-10-25 NOTE — PROGRESS NOTES
Chief Complaint  Prediabetes (EST CARE--no other issues)    Subjective          Jessica Mcdowell presents to [facility] for  History of Present Illness  She is new to me, here to establish care and to follow-up on Pre-Diabetes:    Pre-diabetic - her A1c was 6.1 in 2/2020.  She ate a donut and had a couple sips of coffee with cream this morning.    Hypertension  - she is currently taking Lisinopril 10mg daily.  She was taking Motrin 5 tablets at one time 4 times per week for a couple months.  She stopped taking Motrin in 10/2023 per Cardiology request.  She is followed by Cardiology, Dr. Saleh/AARTI Shaw, next appointment is in 2 months for elevation of blood pressure and heart murmur.  Discussed importance of decreasing table salt and pre-packaged foods high in salt.  She had beef tips and potato last night for dinner.  Cardiology put her on Indapamide and increased Lisinopril but has not picked these medications up yet.  She has only taken Lisinpril 5mg today. Recommended she take second 5mg tablet as soon as possible.  Strongly encouraged patient to  her blood pressure medications that were prescribed by cardiology from the pharmacy and to start taking them.    Asthma - takes Albuterol HFA PRN.  Denies SOB and wheezing today.    Menopausal Symptoms - she reports having frequent hot flashes and wakes up with night sweats.    Review of Systems   Respiratory:  Negative for shortness of breath and wheezing.    Cardiovascular:  Positive for leg swelling. Negative for chest pain and palpitations.        She sometimes will have ankle swelling by the end of the day.   Endocrine: Negative for polydipsia, polyphagia and polyuria.        Hot flashes, night sweats   Neurological:  Negative for dizziness and light-headedness.       Objective   Vital Signs:   BP (!) 174/120 (BP Location: Right arm, Patient Position: Sitting, Cuff Size: Adult) Comment: re-checked   "Pulse 73   Ht 172.7 cm (67.99\")   Wt 111 kg (244 lb)   SpO2 99%   BMI 37.11 kg/m²     Class 2 Severe Obesity (BMI >=35 and <=39.9). Obesity-related health conditions include the following: hypertension. Obesity is worsening. BMI is is above average; BMI management plan is completed. We discussed low calorie, low carb based diet program, portion control, increasing exercise, Information on healthy weight added to patient's after visit summary, and had recent Gastric Sleeve 2022 .     Physical Exam  Vitals and nursing note reviewed.   Constitutional:       General: She is not in acute distress.     Appearance: Normal appearance.   HENT:      Head: Normocephalic and atraumatic.   Eyes:      Conjunctiva/sclera: Conjunctivae normal.   Cardiovascular:      Rate and Rhythm: Normal rate and regular rhythm.      Heart sounds: Murmur heard.   Pulmonary:      Effort: Pulmonary effort is normal. No respiratory distress.      Breath sounds: Normal breath sounds. No wheezing.   Musculoskeletal:      Right lower leg: No edema.      Left lower leg: No edema.   Skin:     General: Skin is warm and dry.      Findings: No erythema.   Neurological:      Mental Status: She is alert.        Result Review :   The following data was reviewed by: AARTI Limon on 10/25/2023:  CBC & CMP from 9/9/2022.    Data reviewed : Cardiology studies EKG 10/10/2023, ECHO 3/2/2022 and Consultant notes AARTI Shaw, Cardiology 10/10/23           Assessment and Plan    Diagnoses and all orders for this visit:    1. Primary hypertension (Primary)  Assessment & Plan:  Hypertension is elevated.  ECHO dated 3/2/22 = LV EF 64.6%  Continue current treatment plan.  Decrease table salt and foods high in salt.  Weight loss.  Increase activity daily.  Continue Indapamide 2.5mg daily PRN (swelling) and Lisinopril 10mg daily.  Cardiology put her on Indapamide and increased Lisinopril but has not picked these medications up yet.  She admits has " only taken Lisinpril 5mg today.  Recommended she take second Lisinopril 5mg tablet (to equal the Lisinopril 10mg dose prescribed by Cardiology) as soon as possible.  Strongly encouraged patient to  her blood pressure medications that were prescribed by cardiology from the pharmacy and to start taking them as prescribed.  Blood pressure will be re-assessed in 1 month.  She is also followed by AARTI Shaw, Cardiology, next appt. 12/13/23.      Orders:  -     CBC & Differential; Future  -     Comprehensive Metabolic Panel; Future    2. Hypertriglyceridemia  Assessment & Plan:  Elevated Triglycerides on labs from 2/2020.  Encouraged lifestyle changes.  Lab:  Lipid Panel  Will re-assess lipids today.      Orders:  -     Lipid Panel; Future    3. Mild intermittent asthma without complication  Assessment & Plan:  Controlled on Albuterol HFA PRN.  Discussed importance of only taking this medication for SOB/Wheezing.  Continue current treatment plan.  Will continue to monitor.      Orders:  -     albuterol sulfate  (90 Base) MCG/ACT inhaler; Inhale 2 puffs Every 6 (Six) Hours As Needed for Wheezing or Shortness of Air.  Dispense: 6.7 g; Refill: 0    4. Prediabetes  Assessment & Plan:  Her A1c was 6.1 in 2/2020.  Encouraged to decrease food/drink high in sugar/carbs to help reduce blood glucose levels/A1c and decrease your risk of diabetes.  Increase activity daily.  Lab:  CMP, A1c  Will continue to monitor.    Orders:  -     Hemoglobin A1c; Future    5. Heart murmur  Assessment & Plan:  Followed by Cardiology, Dr. Saleh/AARTI Shaw.      6. Menopausal symptoms  -     Ambulatory Referral to Gynecology    7. Night sweats  -     Ambulatory Referral to Gynecology  -     TSH+Free T4; Future        Follow Up   Return in about 1 month (around 11/25/2023) for Follow-up Blood Pressure - medication change.  Patient was given instructions and counseling regarding her condition or for  health maintenance advice. Please see specific information pulled into the AVS if appropriate.

## 2023-10-26 LAB
ALBUMIN SERPL-MCNC: 4.8 G/DL (ref 3.5–5.2)
ALBUMIN/GLOB SERPL: 1.7 G/DL
ALP SERPL-CCNC: 116 U/L (ref 39–117)
ALT SERPL-CCNC: 14 U/L (ref 1–33)
AST SERPL-CCNC: 24 U/L (ref 1–32)
BASOPHILS # BLD AUTO: 0.02 10*3/MM3 (ref 0–0.2)
BASOPHILS NFR BLD AUTO: 0.6 % (ref 0–1.5)
BILIRUB SERPL-MCNC: 0.5 MG/DL (ref 0–1.2)
BUN SERPL-MCNC: 10 MG/DL (ref 6–20)
BUN/CREAT SERPL: 11.4 (ref 7–25)
CALCIUM SERPL-MCNC: 10 MG/DL (ref 8.6–10.5)
CHLORIDE SERPL-SCNC: 105 MMOL/L (ref 98–107)
CHOLEST SERPL-MCNC: 230 MG/DL (ref 0–200)
CO2 SERPL-SCNC: 29.8 MMOL/L (ref 22–29)
CREAT SERPL-MCNC: 0.88 MG/DL (ref 0.57–1)
EGFRCR SERPLBLD CKD-EPI 2021: 79.7 ML/MIN/1.73
EOSINOPHIL # BLD AUTO: 0.09 10*3/MM3 (ref 0–0.4)
EOSINOPHIL NFR BLD AUTO: 2.6 % (ref 0.3–6.2)
ERYTHROCYTE [DISTWIDTH] IN BLOOD BY AUTOMATED COUNT: 13.7 % (ref 12.3–15.4)
GLOBULIN SER CALC-MCNC: 2.9 GM/DL
GLUCOSE SERPL-MCNC: 91 MG/DL (ref 65–99)
HBA1C MFR BLD: 5.6 % (ref 4.8–5.6)
HCT VFR BLD AUTO: 38.2 % (ref 34–46.6)
HDLC SERPL-MCNC: 59 MG/DL (ref 40–60)
HGB BLD-MCNC: 12.6 G/DL (ref 12–15.9)
IMM GRANULOCYTES # BLD AUTO: 0.01 10*3/MM3 (ref 0–0.05)
IMM GRANULOCYTES NFR BLD AUTO: 0.3 % (ref 0–0.5)
LDLC SERPL CALC-MCNC: 144 MG/DL (ref 0–100)
LYMPHOCYTES # BLD AUTO: 0.61 10*3/MM3 (ref 0.7–3.1)
LYMPHOCYTES NFR BLD AUTO: 17.9 % (ref 19.6–45.3)
MCH RBC QN AUTO: 26.5 PG (ref 26.6–33)
MCHC RBC AUTO-ENTMCNC: 33 G/DL (ref 31.5–35.7)
MCV RBC AUTO: 80.4 FL (ref 79–97)
MONOCYTES # BLD AUTO: 0.3 10*3/MM3 (ref 0.1–0.9)
MONOCYTES NFR BLD AUTO: 8.8 % (ref 5–12)
NEUTROPHILS # BLD AUTO: 2.37 10*3/MM3 (ref 1.7–7)
NEUTROPHILS NFR BLD AUTO: 69.8 % (ref 42.7–76)
PLATELET # BLD AUTO: 152 10*3/MM3 (ref 140–450)
POTASSIUM SERPL-SCNC: 3.8 MMOL/L (ref 3.5–5.2)
PROT SERPL-MCNC: 7.7 G/DL (ref 6–8.5)
RBC # BLD AUTO: 4.75 10*6/MM3 (ref 3.77–5.28)
SODIUM SERPL-SCNC: 142 MMOL/L (ref 136–145)
T4 FREE SERPL-MCNC: 1.31 NG/DL (ref 0.93–1.7)
TRIGL SERPL-MCNC: 149 MG/DL (ref 0–150)
TSH SERPL DL<=0.005 MIU/L-ACNC: 2.1 UIU/ML (ref 0.27–4.2)
VLDLC SERPL CALC-MCNC: 27 MG/DL (ref 5–40)
WBC # BLD AUTO: 3.4 10*3/MM3 (ref 3.4–10.8)

## 2023-10-30 ENCOUNTER — TELEPHONE (OUTPATIENT)
Dept: FAMILY MEDICINE CLINIC | Facility: CLINIC | Age: 53
End: 2023-10-30

## 2023-10-30 DIAGNOSIS — E78.2 MIXED HYPERLIPIDEMIA: Primary | ICD-10-CM

## 2023-10-30 RX ORDER — ATORVASTATIN CALCIUM 40 MG/1
40 TABLET, FILM COATED ORAL NIGHTLY
Qty: 90 TABLET | Refills: 1 | Status: SHIPPED | OUTPATIENT
Start: 2023-10-30

## 2023-10-30 NOTE — TELEPHONE ENCOUNTER
Caller: Jessica Mcdowell    Relationship: Self    Best call back number: [phone]     What was the call regarding: PATIENT WAS RETURNING A MISSED CALL FOR TEST RESULTS.

## 2023-11-04 PROBLEM — E78.1 HYPERTRIGLYCERIDEMIA: Chronic | Status: ACTIVE | Noted: 2023-11-04

## 2023-11-05 NOTE — ASSESSMENT & PLAN NOTE
Controlled on Albuterol HFA PRN.  Discussed importance of only taking this medication for SOB/Wheezing.  Continue current treatment plan.  Will continue to monitor.

## 2023-11-05 NOTE — ASSESSMENT & PLAN NOTE
Her A1c was 6.1 in 2/2020.  Encouraged to decrease food/drink high in sugar/carbs to help reduce blood glucose levels/A1c and decrease your risk of diabetes.  Increase activity daily.  Lab:  CMP, A1c  Will continue to monitor.

## 2023-11-05 NOTE — ASSESSMENT & PLAN NOTE
Elevated Triglycerides on labs from 2/2020.  Encouraged lifestyle changes.  Lab:  Lipid Panel  Will re-assess lipids today.

## 2023-11-05 NOTE — ASSESSMENT & PLAN NOTE
Hypertension is elevated.  ECHO dated 3/2/22 = LV EF 64.6%  Continue current treatment plan.  Decrease table salt and foods high in salt.  Weight loss.  Increase activity daily.  Continue Indapamide 2.5mg daily PRN (swelling) and Lisinopril 10mg daily.  Cardiology put her on Indapamide and increased Lisinopril but has not picked these medications up yet.  She admits has only taken Lisinpril 5mg today.  Recommended she take second Lisinopril 5mg tablet (to equal the Lisinopril 10mg dose prescribed by Cardiology) as soon as possible.  Strongly encouraged patient to  her blood pressure medications that were prescribed by cardiology from the pharmacy and to start taking them as prescribed.  Blood pressure will be re-assessed in 1 month.  She is also followed by AARTI Shaw, Cardiology, next appt. 12/13/23.

## 2023-11-08 ENCOUNTER — LAB (OUTPATIENT)
Dept: LAB | Facility: HOSPITAL | Age: 53
End: 2023-11-08
Payer: COMMERCIAL

## 2023-11-08 ENCOUNTER — OFFICE VISIT (OUTPATIENT)
Dept: BARIATRICS/WEIGHT MGMT | Facility: CLINIC | Age: 53
End: 2023-11-08
Payer: COMMERCIAL

## 2023-11-08 VITALS
HEIGHT: 68 IN | WEIGHT: 237 LBS | TEMPERATURE: 97.7 F | HEART RATE: 81 BPM | DIASTOLIC BLOOD PRESSURE: 97 MMHG | SYSTOLIC BLOOD PRESSURE: 155 MMHG | BODY MASS INDEX: 35.92 KG/M2

## 2023-11-08 DIAGNOSIS — M25.562 CHRONIC PAIN OF BOTH KNEES: ICD-10-CM

## 2023-11-08 DIAGNOSIS — I10 PRIMARY HYPERTENSION: ICD-10-CM

## 2023-11-08 DIAGNOSIS — D50.8 IRON DEFICIENCY ANEMIA SECONDARY TO INADEQUATE DIETARY IRON INTAKE: ICD-10-CM

## 2023-11-08 DIAGNOSIS — G89.29 CHRONIC PAIN OF BOTH KNEES: ICD-10-CM

## 2023-11-08 DIAGNOSIS — M25.561 CHRONIC PAIN OF BOTH KNEES: ICD-10-CM

## 2023-11-08 DIAGNOSIS — E66.9 OBESITY, CLASS II, BMI 35-39.9: ICD-10-CM

## 2023-11-08 DIAGNOSIS — E66.9 OBESITY, CLASS II, BMI 35-39.9: Primary | ICD-10-CM

## 2023-11-08 PROBLEM — E66.811 OBESITY, CLASS I, BMI 30-34.9: Status: RESOLVED | Noted: 2022-08-10 | Resolved: 2023-11-08

## 2023-11-08 PROBLEM — K44.9 HIATAL HERNIA: Status: RESOLVED | Noted: 2022-08-03 | Resolved: 2023-11-08

## 2023-11-08 LAB
25(OH)D3 SERPL-MCNC: 17.4 NG/ML (ref 30–100)
ALBUMIN SERPL-MCNC: 4.3 G/DL (ref 3.5–5.2)
ALBUMIN/GLOB SERPL: 1.5 G/DL
ALP SERPL-CCNC: 105 U/L (ref 39–117)
ALT SERPL W P-5'-P-CCNC: 20 U/L (ref 1–33)
ANION GAP SERPL CALCULATED.3IONS-SCNC: 8 MMOL/L (ref 5–15)
AST SERPL-CCNC: 35 U/L (ref 1–32)
BASOPHILS # BLD AUTO: 0.04 10*3/MM3 (ref 0–0.2)
BASOPHILS NFR BLD AUTO: 0.9 % (ref 0–1.5)
BILIRUB SERPL-MCNC: 0.5 MG/DL (ref 0–1.2)
BUN SERPL-MCNC: 12 MG/DL (ref 6–20)
BUN/CREAT SERPL: 12.2 (ref 7–25)
CALCIUM SPEC-SCNC: 9.4 MG/DL (ref 8.6–10.5)
CHLORIDE SERPL-SCNC: 104 MMOL/L (ref 98–107)
CO2 SERPL-SCNC: 30 MMOL/L (ref 22–29)
CREAT SERPL-MCNC: 0.98 MG/DL (ref 0.57–1)
DEPRECATED RDW RBC AUTO: 41.7 FL (ref 37–54)
EGFRCR SERPLBLD CKD-EPI 2021: 70 ML/MIN/1.73
EOSINOPHIL # BLD AUTO: 0.16 10*3/MM3 (ref 0–0.4)
EOSINOPHIL NFR BLD AUTO: 3.6 % (ref 0.3–6.2)
ERYTHROCYTE [DISTWIDTH] IN BLOOD BY AUTOMATED COUNT: 14.1 % (ref 12.3–15.4)
FERRITIN SERPL-MCNC: 288 NG/ML (ref 13–150)
FOLATE SERPL-MCNC: 5.04 NG/ML (ref 4.78–24.2)
GLOBULIN UR ELPH-MCNC: 2.9 GM/DL
GLUCOSE SERPL-MCNC: 86 MG/DL (ref 65–99)
HCT VFR BLD AUTO: 34.2 % (ref 34–46.6)
HGB BLD-MCNC: 11 G/DL (ref 12–15.9)
IMM GRANULOCYTES # BLD AUTO: 0.02 10*3/MM3 (ref 0–0.05)
IMM GRANULOCYTES NFR BLD AUTO: 0.4 % (ref 0–0.5)
IRON 24H UR-MRATE: 52 MCG/DL (ref 37–145)
LYMPHOCYTES # BLD AUTO: 0.94 10*3/MM3 (ref 0.7–3.1)
LYMPHOCYTES NFR BLD AUTO: 21 % (ref 19.6–45.3)
MCH RBC QN AUTO: 26.4 PG (ref 26.6–33)
MCHC RBC AUTO-ENTMCNC: 32.2 G/DL (ref 31.5–35.7)
MCV RBC AUTO: 82.2 FL (ref 79–97)
MONOCYTES # BLD AUTO: 0.35 10*3/MM3 (ref 0.1–0.9)
MONOCYTES NFR BLD AUTO: 7.8 % (ref 5–12)
NEUTROPHILS NFR BLD AUTO: 2.97 10*3/MM3 (ref 1.7–7)
NEUTROPHILS NFR BLD AUTO: 66.3 % (ref 42.7–76)
NRBC BLD AUTO-RTO: 0.2 /100 WBC (ref 0–0.2)
PLATELET # BLD AUTO: 147 10*3/MM3 (ref 140–450)
PMV BLD AUTO: 12.4 FL (ref 6–12)
POTASSIUM SERPL-SCNC: 4.2 MMOL/L (ref 3.5–5.2)
PREALB SERPL-MCNC: 16.4 MG/DL (ref 20–40)
PROT SERPL-MCNC: 7.2 G/DL (ref 6–8.5)
RBC # BLD AUTO: 4.16 10*6/MM3 (ref 3.77–5.28)
SODIUM SERPL-SCNC: 142 MMOL/L (ref 136–145)
WBC NRBC COR # BLD: 4.48 10*3/MM3 (ref 3.4–10.8)

## 2023-11-08 PROCEDURE — 83540 ASSAY OF IRON: CPT

## 2023-11-08 PROCEDURE — 82306 VITAMIN D 25 HYDROXY: CPT

## 2023-11-08 PROCEDURE — 99213 OFFICE O/P EST LOW 20 MIN: CPT | Performed by: NURSE PRACTITIONER

## 2023-11-08 PROCEDURE — 36415 COLL VENOUS BLD VENIPUNCTURE: CPT

## 2023-11-08 PROCEDURE — 83921 ORGANIC ACID SINGLE QUANT: CPT

## 2023-11-08 PROCEDURE — 80053 COMPREHEN METABOLIC PANEL: CPT

## 2023-11-08 PROCEDURE — 84425 ASSAY OF VITAMIN B-1: CPT

## 2023-11-08 PROCEDURE — 82746 ASSAY OF FOLIC ACID SERUM: CPT

## 2023-11-08 PROCEDURE — 84134 ASSAY OF PREALBUMIN: CPT

## 2023-11-08 PROCEDURE — 82728 ASSAY OF FERRITIN: CPT

## 2023-11-08 PROCEDURE — 85025 COMPLETE CBC W/AUTO DIFF WBC: CPT

## 2023-11-08 RX ORDER — AZITHROMYCIN 250 MG/1
500 TABLET, FILM COATED ORAL DAILY
COMMUNITY
Start: 2023-11-06

## 2023-11-08 NOTE — PROGRESS NOTES
[facility]  [address] [facility] [address]  [facility] [address]  [phone]  [address] [address]  [phone]  Dept: [phone]  11/8/2023      Jessica Mcdowell.  [id number]  [id number]  1970  female      Chief Complaint   Patient presents with    Follow-up     sleeve       BH Post-Op Bariatric Surgery:   Jessica Mcdowell is status post Laparoscopic Sleeve procedure, performed on 8/3/22     HPI:       Today's weight is 108 kg (237 lb) pounds, today's BMI is Body mass index is 36.04 kg/m²., she has a gain of 18 pounds since the last visit (6 months ago) and her weight loss since surgery is 41 pounds. The patient reports a decreased portion size and loss of appetite.    Jessica Mcdowell denies nausea, vomiting, reflux and reports that she has been gaining weight, she has been seeing hot flashes, pain in the bottom of her feet, and her blood pressure running higher. She reports that she went in for a physical and her PCP seemed to think it was related to her sodium intake. She doesn't drink soda. She only drinks coffee or water.     She reports that she had gained 14lb in the span within two weeks between PCP visits. She does report mild dependent edema at the end of a long work day. She reports taking      Diet and Exercise: Diet history reviewed and discussed with the patient. Weight loss/gains to date discussed with the patient. The patient states they are eating 60 grams of protein per day. She reports eating 3 meals per day, a typical portion size of 1/2 cup, eating 1-2 snacks per day, drinking 5-6 or more 8-oz. glasses of water per day, no carbonated beverage consumption and exercising regularly.     Diet: protein shake (24g) in the mornings, she has cut out coffee for the last two weeks. She reports that her blood pressure is starting to improve. Protein shake for lunch. Greek yogurt for protein as a snack, kiwi. She  reports that her hunger is well controlled but she does occasionally have a pack of snack crackers or cheetos but this is a lot less than how often she was eating these. She does usually have a solid dinner on Sundays. If she eats a salad this will be all that she can eat.     Supplements: OTC MTV with iron     Review of Systems   Constitutional:  Positive for appetite change. Negative for fatigue and unexpected weight change.        Hot flashes   HENT: Negative.     Eyes: Negative.    Respiratory: Negative.     Cardiovascular:  Positive for leg swelling (dependent edema in ankles and legs).   Gastrointestinal:  Negative for abdominal distention, abdominal pain, constipation, diarrhea, nausea and vomiting.   Genitourinary:  Negative for difficulty urinating, frequency and urgency.   Musculoskeletal:  Negative for back pain.   Skin: Negative.    Psychiatric/Behavioral:  Positive for sleep disturbance (insomnia, hot flashes).    All other systems reviewed and are negative.      Patient Active Problem List   Diagnosis    Iron deficiency anemia secondary to inadequate dietary iron intake    HTN (hypertension)    Seasonal allergies    Chronic fatigue    Heart murmur    Asthma    IBS (irritable bowel syndrome)    Hemorrhoids    History of kidney stones    Chronic pain of both knees    Rheumatoid arthritis    Gout    Prediabetes    History of heart attack    Localized edema    Dietary counseling    Left leg swelling    Obesity, Class II, BMI 35-39.9    S/P laparoscopic sleeve gastrectomy    Constipation    Fluid retention    Hypertriglyceridemia       Past Medical History:   Diagnosis Date    Asthma     Eczema     History of anemia     History of gout     History of hemorrhoids     Hypertension     Kidney stone     history    Leiomyoma     Murmur, heart     PONV (postoperative nausea and vomiting)     Rheumatoid arthritis 02/16/2022       The following portions of the patient's history were reviewed and updated as  appropriate: allergies, current medications, past family history, past medical history, past social history, past surgical history, and problem list.    Vitals:    11/08/23 1333   BP: 155/97   Pulse: 81   Temp: 97.7 °F (36.5 °C)       Physical Exam  Vitals and nursing note reviewed.   Constitutional:       Appearance: She is well-developed.   Neck:      Thyroid: No thyromegaly.   Cardiovascular:      Rate and Rhythm: Normal rate.   Pulmonary:      Effort: Pulmonary effort is normal. No respiratory distress.   Abdominal:      Palpations: Abdomen is soft.   Musculoskeletal:         General: No tenderness.   Skin:     General: Skin is warm and dry.   Neurological:      Mental Status: She is alert.   Psychiatric:         Behavior: Behavior normal.         Assessment:   Post-op, the patient is frustrated with weight regain despite trying to stick to a low sodium diet and is still seeing hot flashes.     Encounter Diagnoses   Name Primary?    Obesity, Class II, BMI 35-39.9 Yes    Primary hypertension     Iron deficiency anemia secondary to inadequate dietary iron intake     Chronic pain of both knees        Plan:   Will trend annual vitamin levels and prealbumin. Patient was advised to continue leading with protein and fiber at each meal. Encouraged to make a Gyn appointment to evaluate blood work regarding menopause as she is having joint pain, hot flashes, and trouble sleeping.   Encouraged patient to be sure to get plenty of lean protein per day through small frequent meals all with a protein source.   Activity restrictions: none.   Recommended patient be sure to get at least 70 grams of protein per day by eating small, frequent meals all with high lean protein choices. Be sure to limit/cut back on daily carbohydrate intake. Discussed with the patient the recommended amount of water per day to intake- half of body weight in ounces. Reviewed vitamin requirements. Be sure to do routine exercise, 150 minutes per week  minimum, including both cardio and strength training.     Instructions / Recommendations: dietary counseling recommended, recommended a daily protein intake of  grams, vitamin supplement(s) recommended, recommended exercising at least 150 minutes per week, behavior modifications recommended and instructed to call the office for concerns, questions, or problems.     The patient was instructed to follow up in 3 months .      Total time spent during this encounter today was 25 minutes

## 2023-11-13 LAB
METHYLMALONATE SERPL-SCNC: 150 NMOL/L (ref 0–378)
VIT B1 BLD-SCNC: 74.3 NMOL/L (ref 66.5–200)

## 2023-11-16 ENCOUNTER — TELEPHONE (OUTPATIENT)
Dept: FAMILY MEDICINE CLINIC | Facility: CLINIC | Age: 53
End: 2023-11-16

## 2023-11-16 NOTE — TELEPHONE ENCOUNTER
Caller: Jessica Mcdowell    Relationship: Self    Best call back number: [phone]     What was the call regarding: PATIENT WANTED TO LET LAMONTE KNOW THAT HER HEMOGLOBIN WENT FROM 12 TO 11.5 WITHIN THE PAST TWO WEEKS. HER MOST RECENT LABS WERE DONE BY HER BARIATRIC SURGEON. DOES LAMONTE WANT TO RUN MORE TESTS? PLEASE ADVISE.

## 2023-11-17 RX ORDER — ERGOCALCIFEROL 1.25 MG/1
50000 CAPSULE ORAL
Qty: 12 CAPSULE | Refills: 0 | Status: SHIPPED | OUTPATIENT
Start: 2023-11-17 | End: 2024-02-03

## 2023-11-19 DIAGNOSIS — J45.20 MILD INTERMITTENT ASTHMA WITHOUT COMPLICATION: Chronic | ICD-10-CM

## 2023-11-20 RX ORDER — ALBUTEROL SULFATE 90 UG/1
2 AEROSOL, METERED RESPIRATORY (INHALATION) EVERY 6 HOURS PRN
Qty: 8.5 G | Refills: 3 | Status: SHIPPED | OUTPATIENT
Start: 2023-11-20

## 2023-12-06 ENCOUNTER — OFFICE VISIT (OUTPATIENT)
Dept: FAMILY MEDICINE CLINIC | Facility: CLINIC | Age: 53
End: 2023-12-06
Payer: COMMERCIAL

## 2023-12-06 VITALS
DIASTOLIC BLOOD PRESSURE: 80 MMHG | SYSTOLIC BLOOD PRESSURE: 132 MMHG | OXYGEN SATURATION: 97 % | HEIGHT: 68 IN | WEIGHT: 244 LBS | BODY MASS INDEX: 36.98 KG/M2 | RESPIRATION RATE: 16 BRPM | HEART RATE: 68 BPM

## 2023-12-06 DIAGNOSIS — R31.21 ASYMPTOMATIC MICROSCOPIC HEMATURIA: ICD-10-CM

## 2023-12-06 DIAGNOSIS — R39.89 URINE DISCOLORATION: Primary | ICD-10-CM

## 2023-12-06 DIAGNOSIS — R82.90 ABNORMAL URINE ODOR: ICD-10-CM

## 2023-12-06 LAB
BILIRUB BLD-MCNC: NEGATIVE MG/DL
CLARITY, POC: ABNORMAL
COLOR UR: YELLOW
EXPIRATION DATE: ABNORMAL
GLUCOSE UR STRIP-MCNC: NEGATIVE MG/DL
KETONES UR QL: NEGATIVE
LEUKOCYTE EST, POC: NEGATIVE
Lab: ABNORMAL
NITRITE UR-MCNC: NEGATIVE MG/ML
PH UR: 6 [PH] (ref 5–8)
PROT UR STRIP-MCNC: ABNORMAL MG/DL
RBC # UR STRIP: ABNORMAL /UL
SP GR UR: 1.02 (ref 1–1.03)
UROBILINOGEN UR QL: NORMAL

## 2023-12-06 PROCEDURE — 99214 OFFICE O/P EST MOD 30 MIN: CPT | Performed by: INTERNAL MEDICINE

## 2023-12-06 PROCEDURE — 81003 URINALYSIS AUTO W/O SCOPE: CPT | Performed by: INTERNAL MEDICINE

## 2023-12-06 NOTE — PROGRESS NOTES
Subjective chief complaint is cloudy urine with an odor  Jessica Mcdowell is a 53 y.o. female.     History of Present Illness Jessica is here today for problems with her urine.  She reports that approximately Saturday she noticed some cloudiness to her urine.  There may have also been a little bit of an ammonia smell.  Since that time the cloudiness has resolved but there is still an ammonia smell to her urine.  She is having a little bit of low back pain but not flank pain.  She does have a prior history of kidney stones but has not passed any recently.  Her urinalysis today shows 3+ blood but no white cells or nitrites.  I did review some previous urinalyses that were likely done through the weight loss clinic.  She has had microscopic hematuria on basically all of her urinalyses dating back 2 years.  I cannot find any recent type of CAT scan of the abdomen or pelvis.  She has not seen a urologist.  She has not had any abnormal vaginal bleeding or discharge.  She does report that she drinks 2x32 ounce containers of water daily.  She also has been using a protein shake daily.  That may be somewhat where the funny smell is coming from.    The following portions of the patient's history were reviewed and updated as appropriate: allergies, current medications, and problem list.    Review of Systems   Constitutional:  Negative for chills and fever.   Genitourinary:  Negative for difficulty urinating, dysuria, vaginal bleeding and vaginal discharge.   Musculoskeletal:  Positive for back pain.       Objective   Physical Exam  Vitals and nursing note reviewed.   Constitutional:       Appearance: Normal appearance.   Abdominal:      Tenderness: There is no right CVA tenderness or left CVA tenderness.      Comments: There is some  mild tenderness without guarding in the left lower quadrant.   Genitourinary:     Comments: She has no suprapubic tenderness.  Neurological:      Mental Status: She is alert.           Assessment  & Plan   Diagnoses and all orders for this visit:    1. Urine discoloration (Primary)  -     POCT urinalysis dipstick, automated  -     Urine Culture - Urine, Urine, Clean Catch    2. Abnormal urine odor  -     Urine Culture - Urine, Urine, Clean Catch    3. Asymptomatic microscopic hematuria  -     Urine Culture - Urine, Urine, Clean Catch    Jessica is here today for some urinary issues.  I am going to send her urine for culture.  I did advise we will hold off on any treatment until we see the culture.  If there is bacteria we may treat that and then repeat the urine.  If the hematuria does not clear up with treatment then we would refer her to a urologist.

## 2023-12-11 ENCOUNTER — TELEPHONE (OUTPATIENT)
Dept: FAMILY MEDICINE CLINIC | Facility: CLINIC | Age: 53
End: 2023-12-11
Payer: COMMERCIAL

## 2023-12-11 LAB
BACTERIA UR CULT: ABNORMAL
BACTERIA UR CULT: ABNORMAL
OTHER ANTIBIOTIC SUSC ISLT: ABNORMAL

## 2023-12-11 RX ORDER — CEFUROXIME AXETIL 250 MG/1
250 TABLET ORAL 2 TIMES DAILY
Qty: 10 TABLET | Refills: 0 | Status: SHIPPED | OUTPATIENT
Start: 2023-12-11

## 2023-12-26 ENCOUNTER — TELEPHONE (OUTPATIENT)
Dept: FAMILY MEDICINE CLINIC | Facility: CLINIC | Age: 53
End: 2023-12-26
Payer: COMMERCIAL

## 2023-12-26 DIAGNOSIS — Z12.31 ENCOUNTER FOR SCREENING MAMMOGRAM FOR BREAST CANCER: Primary | ICD-10-CM

## 2023-12-26 NOTE — TELEPHONE ENCOUNTER
Talked to patient in regards of over due screening for mammography. Patient stated that they would like to be scheduled. Could you please put an order in to get this service taken care of

## 2024-01-16 ENCOUNTER — HOSPITAL ENCOUNTER (OUTPATIENT)
Dept: MAMMOGRAPHY | Facility: HOSPITAL | Age: 54
Discharge: HOME | End: 2024-01-16
Admitting: NURSE PRACTITIONER
Payer: COMMERCIAL

## 2024-01-16 DIAGNOSIS — Z12.31 ENCOUNTER FOR SCREENING MAMMOGRAM FOR BREAST CANCER: ICD-10-CM

## 2024-01-16 PROCEDURE — 77067 SCR MAMMO BI INCL CAD: CPT

## 2024-01-16 PROCEDURE — 77063 BREAST TOMOSYNTHESIS BI: CPT

## 2024-01-30 ENCOUNTER — OFFICE VISIT (OUTPATIENT)
Dept: BARIATRICS/WEIGHT MGMT | Facility: CLINIC | Age: 54
End: 2024-01-30
Payer: COMMERCIAL

## 2024-01-30 VITALS
HEIGHT: 68 IN | TEMPERATURE: 97.3 F | HEART RATE: 77 BPM | BODY MASS INDEX: 36.92 KG/M2 | SYSTOLIC BLOOD PRESSURE: 135 MMHG | WEIGHT: 243.6 LBS | DIASTOLIC BLOOD PRESSURE: 94 MMHG

## 2024-01-30 DIAGNOSIS — E66.9 OBESITY, CLASS II, BMI 35-39.9: Primary | ICD-10-CM

## 2024-01-30 DIAGNOSIS — Z98.84 S/P LAPAROSCOPIC SLEEVE GASTRECTOMY: ICD-10-CM

## 2024-01-30 DIAGNOSIS — M06.9 RHEUMATOID ARTHRITIS OF OTHER SITE, UNSPECIFIED WHETHER RHEUMATOID FACTOR PRESENT: ICD-10-CM

## 2024-01-30 DIAGNOSIS — I10 PRIMARY HYPERTENSION: ICD-10-CM

## 2024-01-30 DIAGNOSIS — D50.8 IRON DEFICIENCY ANEMIA SECONDARY TO INADEQUATE DIETARY IRON INTAKE: ICD-10-CM

## 2024-01-30 RX ORDER — SEMAGLUTIDE 0.25 MG/.5ML
0.25 INJECTION, SOLUTION SUBCUTANEOUS WEEKLY
Qty: 2 ML | Refills: 0 | Status: SHIPPED | OUTPATIENT
Start: 2024-01-30 | End: 2024-02-27

## 2024-01-30 NOTE — PROGRESS NOTES
"[facility] BARIATRIC SURGERY  [address] [facility] [address]  [facility] [id number] [phone]  [address] [facility] [address]  [facility] [id number] [phone]  Dept: [phone]  1/30/2024      Jessica Mcdowlel.  [id number]  [id number]  1970  female      Chief Complaint   Patient presents with    Follow-up     Follow up sleeve       BH Post-Op Bariatric Surgery:   Jessica Mcdowell is status post Laparoscopic Sleeve procedure, performed on 8/3/22     HPI:       Today's weight is 110 kg (243 lb 9.6 oz) pounds, today's BMI is Body mass index is 37.05 kg/m²., she has a gain of 6 pounds since the last visit and her weight loss since surgery is 35 pounds. The patient reports a decreased portion size and loss of appetite.    Jessica Mcdowell denies n/v, reflux and reports that she has been working two jobs and is trying to finish her masters. She has been stressed and turning to food. She has a gym membership but has yet to use it regularly due to time constraints. She reports that salad and fruit make her feel bloated and she feels that if she eats healthfully or unhealthfully she gains weight weight way. She has ordered protein shakes with at least 24g of protein daily. She takes two to work with her and always gets at least one shake per day usually 1.5 daily. She likes mandarin oranges, may eat 3 throughout the day. She may get something such as chips or something out the vending machine if she is feeling hungry. She was trying to eat more greek yogurt but was feeling so full she was having trouble getting all of her protein in. Patient reports that \"solid foods are not her friend\" and she is convinced that getting protein from solid foods will only contribute to weight gain.     She did ask about re-sleeving vs conversion to RNY gastric bypass.    Through February she was primarily doing protein shakes and liquids to help get her protein in. She gets up at 5am, gets home " at 8pm and needs to study so usually just grabs something quickly.     Diet and Exercise: Diet history reviewed and discussed with the patient. Weight loss/gains to date discussed with the patient. The patient states they are eating 40-50 grams of protein per day. She reports eating 3 meals per day, a typical portion size of 1/3-1/2 cup, eating 1 snacks per day, drinking 4-5 or more 8-oz. glasses of water per day, no carbonated beverage consumption and exercising regularly.     Supplements: OTC MTV with iron and calcium.     Review of Systems   Constitutional:  Positive for appetite change and fatigue. Negative for unexpected weight change.   HENT: Negative.     Eyes: Negative.    Respiratory: Negative.     Cardiovascular: Negative.  Negative for leg swelling.   Gastrointestinal:  Negative for abdominal distention, abdominal pain, constipation, diarrhea, nausea and vomiting.   Genitourinary:  Negative for difficulty urinating, frequency and urgency.   Musculoskeletal:  Negative for back pain.   Skin: Negative.    Psychiatric/Behavioral: Negative.     All other systems reviewed and are negative.      Patient Active Problem List   Diagnosis    Iron deficiency anemia secondary to inadequate dietary iron intake    HTN (hypertension)    Seasonal allergies    Chronic fatigue    Heart murmur    Asthma    IBS (irritable bowel syndrome)    Hemorrhoids    History of kidney stones    Chronic pain of both knees    Rheumatoid arthritis    Gout    Prediabetes    History of heart attack    Localized edema    Dietary counseling    Left leg swelling    Obesity, Class II, BMI 35-39.9    S/P laparoscopic sleeve gastrectomy    Constipation    Fluid retention    Hypertriglyceridemia       Past Medical History:   Diagnosis Date    Asthma     Eczema     History of anemia     History of gout     History of hemorrhoids     Hypertension     Kidney stone     history    Leiomyoma     Murmur, heart     PONV (postoperative nausea and vomiting)      Rheumatoid arthritis 02/16/2022       The following portions of the patient's history were reviewed and updated as appropriate: allergies, current medications, past family history, past medical history, past social history, past surgical history, and problem list.    Vitals:    01/30/24 1137   BP: 135/94   Pulse: 77   Temp: 97.3 °F (36.3 °C)       Physical Exam  Vitals and nursing note reviewed.   Constitutional:       Appearance: She is well-developed.   Neck:      Thyroid: No thyromegaly.   Cardiovascular:      Rate and Rhythm: Normal rate.   Pulmonary:      Effort: Pulmonary effort is normal. No respiratory distress.   Abdominal:      Palpations: Abdomen is soft.   Musculoskeletal:         General: No tenderness.   Skin:     General: Skin is warm and dry.   Neurological:      Mental Status: She is alert.   Psychiatric:         Behavior: Behavior normal.         Assessment:   Post-op, the patient is frustrated with weight regain and would like to move forward with options for medical weight loss and is interested in evaluating sleeve size.    Encounter Diagnoses   Name Primary?    Obesity, Class II, BMI 35-39.9 Yes    S/P laparoscopic sleeve gastrectomy     Primary hypertension     Iron deficiency anemia secondary to inadequate dietary iron intake     Rheumatoid arthritis of other site, unspecified whether rheumatoid factor present        Plan:   We discussed the option for re-sleeving and patient was advised that it is very rare that there is enough fundus leftover after initial sleeve to remove more tissue. She already reports a very small solid portion size and reports having trouble getting all of her protein in consistently when attempting to obtain it from food over protein shakes due to feeling so full for so long. She is convinced that weight gain is related to intake of solids. We discussed that liquids will not keep her full, stabilize her blood sugar, or provide the same satiety as solid foods. She  was advised to focus on soft foods for protein over liquids at least at lunch and dinner such as pairing one of her oranges with cottage cheese or greek yogurt. Discussed buying rotisserie chicken or tuna for easy dinners when she doesn't feel like cooking.   Encouraged patient to be sure to get plenty of lean protein per day through small frequent meals all with a protein source. She would like to discuss medical weight loss. I am concerned about further reducing her portion sizes but she may see some movement in weight with a low dose. She does not have a history of diabetes. She does have insurance through the state and would likely have coverage for wegovy or saxenda, but due to lack of stock we discussed that it may be 2-6 months before she can consistently find the medication.  Activity restrictions: none.   Recommended patient be sure to get at least 70 grams of protein per day by eating small, frequent meals all with high lean protein choices. Be sure to limit/cut back on daily carbohydrate intake. Discussed with the patient the recommended amount of water per day to intake- half of body weight in ounces. Reviewed vitamin requirements. Be sure to do routine exercise, 150 minutes per week minimum, including both cardio and strength training.     Instructions / Recommendations: dietary counseling recommended, recommended a daily protein intake of  grams, vitamin supplement(s) recommended, recommended exercising at least 150 minutes per week, behavior modifications recommended and instructed to call the office for concerns, questions, or problems.     The patient was instructed to follow up in 3-4 months .      Total time spent during this encounter today was 45 minutes

## 2024-02-09 DIAGNOSIS — E78.2 MIXED HYPERLIPIDEMIA: ICD-10-CM

## 2024-02-09 RX ORDER — ATORVASTATIN CALCIUM 40 MG/1
40 TABLET, FILM COATED ORAL NIGHTLY
Qty: 90 TABLET | Refills: 1 | Status: SHIPPED | OUTPATIENT
Start: 2024-02-09

## 2024-02-14 RX ORDER — ERGOCALCIFEROL 1.25 MG/1
50000 CAPSULE ORAL
Qty: 12 CAPSULE | Refills: 0 | OUTPATIENT
Start: 2024-02-14

## 2024-02-15 ENCOUNTER — OFFICE VISIT (OUTPATIENT)
Dept: BARIATRICS/WEIGHT MGMT | Facility: CLINIC | Age: 54
End: 2024-02-15
Payer: COMMERCIAL

## 2024-02-15 VITALS
WEIGHT: 243 LBS | HEIGHT: 68 IN | TEMPERATURE: 97.3 F | SYSTOLIC BLOOD PRESSURE: 140 MMHG | DIASTOLIC BLOOD PRESSURE: 86 MMHG | HEART RATE: 80 BPM | BODY MASS INDEX: 36.83 KG/M2

## 2024-02-15 DIAGNOSIS — R63.5 UNINTENDED WEIGHT GAIN: ICD-10-CM

## 2024-02-15 DIAGNOSIS — E66.9 OBESITY, CLASS II, BMI 35-39.9: Primary | ICD-10-CM

## 2024-02-15 DIAGNOSIS — Z98.84 S/P LAPAROSCOPIC SLEEVE GASTRECTOMY: ICD-10-CM

## 2024-02-15 DIAGNOSIS — I10 HYPERTENSION, UNSPECIFIED TYPE: ICD-10-CM

## 2024-02-15 DIAGNOSIS — Z71.3 DIETARY COUNSELING: ICD-10-CM

## 2024-02-15 PROCEDURE — 99214 OFFICE O/P EST MOD 30 MIN: CPT | Performed by: SURGERY

## 2024-02-15 NOTE — H&P (VIEW-ONLY)
[facility] BARIATRIC SURGERY  [address] [address]  [phone]  [address] [id number] [phone]  Dept: [phone]  2/15/2024      Jessica Mcdowell.  [id number]  [id number]  1970  female      Chief Complaint   Patient presents with    Follow-up     Fup sleeve       BH Post-Op Bariatric Surgery:   Jessica Mcdowell is status post Laparoscopic Sleeve procedure, performed on 8/3/22     HPI:   Today's weight is 110 kg (243 lb) pounds, today's BMI is Body mass index is 36.96 kg/m²., a  gain of 0 pounds since the last visit and weight loss since surgery is 45 pounds.    Jessica Mcdowell denies nausea vomiting fever chills chest pain shortness of air abdominal pain and reports some constipation     Diet and Exercise: Diet history reviewed and discussed with the patient. Weight loss/gains to date discussed with the patient. The patient states they are eating around 70 grams of protein per day. She reports eating 2 meals per day, a typical portion size of 1 cup, eating 2 snacks per day, drinking 5 or more 8-oz. glasses of water per day, no carbonated beverage consumption and exercising regularly.     Supplements: Yes.     Review of Systems   Constitutional:  Positive for fatigue.   Gastrointestinal:  Positive for constipation.   All other systems reviewed and are negative.        * No active hospital problems. *      Past Medical History:   Diagnosis Date    Asthma     Eczema     History of anemia     History of gout     History of hemorrhoids     Hypertension     Kidney stone     history    Leiomyoma     Murmur, heart     PONV (postoperative nausea and vomiting)     Rheumatoid arthritis 02/16/2022       The following portions of the patient's history were reviewed and updated as appropriate: allergies, current medications, past family history, past medical history, past social history, past surgical history, and  problem list.    Vitals:    02/15/24 1506   BP: 140/86   Pulse: 80   Temp: 97.3 °F (36.3 °C)       Physical Exam  Constitutional:       Appearance: Normal appearance.   HENT:      Head: Normocephalic and atraumatic.   Eyes:      Conjunctiva/sclera: Conjunctivae normal.   Pulmonary:      Effort: Pulmonary effort is normal.   Neurological:      Mental Status: She is alert and oriented to person, place, and time.   Psychiatric:         Mood and Affect: Mood normal.         Behavior: Behavior normal.         Thought Content: Thought content normal.         Judgment: Judgment normal.         Assessment:   Post-op, the patient status post sleeve gastrectomy August 2022 who get down to 213 pounds from 280 pounds and was feeling well.  Her goal weight is just under 200 pounds.  She is eating clean concentrating on protein.  She does have trouble with wanting sweets.  She feels like she is eating larger portions as well.  She went her daily routine and and is eating clean except for afternoon snack after leaving school as a  and may be getting a Snickers.  Otherwise eating very well.  We went over all the different options of getting back on track with her diet with clean eating and time restricted eating.  Also discussed weight loss medications.  She tried to do Wegovy but there is no medication available.  We did talk about resleeve and also possible conversion to other procedures which I discussed.  All questions answered.  We will start with an upper endoscopy to evaluate her gastric sleeve.  The risks and benefits of the procedure were discussed with the patient in detail and all questions were answered.  Possibility of perforation, bleeding, aspiration, anoxic brain injury, respiratory and/or cardiac arrest and death were discussed.  Consent will be signed and witnessed..     Encounter Diagnoses   Name Primary?    Obesity, Class II, BMI 35-39.9 Yes    S/P laparoscopic sleeve gastrectomy     Dietary  counseling     Hypertension, unspecified type     Unintended weight gain        Plan:     Encouraged patient to be sure to get plenty of lean protein per day through small meals all with a protein source.   Activity restrictions: none.   Recommended patient be sure to get at least 70 grams of protein per day by eating small  meals all with high lean protein choices. Be sure to limit/cut back on daily carbohydrate intake. Discussed with the patient the recommended amount of water per day to intake- half of body weight in ounces. Reviewed vitamin requirements. Be sure to do routine exercise, 150 minutes per week minimum, including both cardio and strength training.     Instructions / Recommendations: dietary counseling recommended, recommended a daily protein intake of  grams, vitamin supplement(s) recommended, recommended exercising at least 150 minutes per week, behavior modifications recommended and instructed to call the office for concerns, questions, or problems.     The patient was instructed to follow up in after endoscopy.     The patient was counseled regarding the above procedure. Total time spent on this encounter was  30 minutes including reviewing previous notes, lab results and face to face time spent with the patient.  The majority of time was spent counseling the patient regarding diet and exercise as well as reviewing their medications and their compliance with the procedure.

## 2024-02-28 ENCOUNTER — PREP FOR SURGERY (OUTPATIENT)
Dept: OTHER | Facility: HOSPITAL | Age: 54
End: 2024-02-28
Payer: COMMERCIAL

## 2024-02-28 DIAGNOSIS — K21.9 GASTROESOPHAGEAL REFLUX DISEASE, UNSPECIFIED WHETHER ESOPHAGITIS PRESENT: Primary | ICD-10-CM

## 2024-02-28 RX ORDER — SODIUM CHLORIDE, SODIUM LACTATE, POTASSIUM CHLORIDE, CALCIUM CHLORIDE 600; 310; 30; 20 MG/100ML; MG/100ML; MG/100ML; MG/100ML
30 INJECTION, SOLUTION INTRAVENOUS CONTINUOUS
OUTPATIENT
Start: 2024-02-28

## 2024-03-05 ENCOUNTER — ANESTHESIA EVENT (OUTPATIENT)
Dept: GASTROENTEROLOGY | Facility: HOSPITAL | Age: 54
End: 2024-03-05
Payer: COMMERCIAL

## 2024-03-05 ENCOUNTER — ANESTHESIA (OUTPATIENT)
Dept: GASTROENTEROLOGY | Facility: HOSPITAL | Age: 54
End: 2024-03-05
Payer: COMMERCIAL

## 2024-03-05 ENCOUNTER — HOSPITAL ENCOUNTER (OUTPATIENT)
Facility: HOSPITAL | Age: 54
Setting detail: HOSPITAL OUTPATIENT SURGERY
Discharge: HOME | End: 2024-03-05
Attending: SURGERY | Admitting: SURGERY
Payer: COMMERCIAL

## 2024-03-05 VITALS
HEART RATE: 76 BPM | HEIGHT: 68 IN | SYSTOLIC BLOOD PRESSURE: 143 MMHG | OXYGEN SATURATION: 98 % | WEIGHT: 247.2 LBS | DIASTOLIC BLOOD PRESSURE: 112 MMHG | BODY MASS INDEX: 37.47 KG/M2 | RESPIRATION RATE: 12 BRPM

## 2024-03-05 DIAGNOSIS — K21.9 GASTROESOPHAGEAL REFLUX DISEASE, UNSPECIFIED WHETHER ESOPHAGITIS PRESENT: ICD-10-CM

## 2024-03-05 PROCEDURE — 25010000002 PROPOFOL 10 MG/ML EMULSION: Performed by: REGISTERED NURSE

## 2024-03-05 PROCEDURE — 43239 EGD BIOPSY SINGLE/MULTIPLE: CPT | Performed by: SURGERY

## 2024-03-05 PROCEDURE — 87081 CULTURE SCREEN ONLY: CPT | Performed by: SURGERY

## 2024-03-05 PROCEDURE — 25810000003 LACTATED RINGERS PER 1000 ML: Performed by: SURGERY

## 2024-03-05 RX ORDER — SODIUM CHLORIDE, SODIUM LACTATE, POTASSIUM CHLORIDE, CALCIUM CHLORIDE 600; 310; 30; 20 MG/100ML; MG/100ML; MG/100ML; MG/100ML
30 INJECTION, SOLUTION INTRAVENOUS CONTINUOUS
Status: DISCONTINUED | OUTPATIENT
Start: 2024-03-05 | End: 2024-03-05 | Stop reason: HOSPADM

## 2024-03-05 RX ORDER — ASPIRIN 81 MG/1
81 TABLET ORAL DAILY
COMMUNITY

## 2024-03-05 RX ORDER — ONDANSETRON 2 MG/ML
4 INJECTION INTRAMUSCULAR; INTRAVENOUS ONCE AS NEEDED
Status: DISCONTINUED | OUTPATIENT
Start: 2024-03-05 | End: 2024-03-05 | Stop reason: HOSPADM

## 2024-03-05 RX ORDER — LIDOCAINE HYDROCHLORIDE 20 MG/ML
INJECTION, SOLUTION INFILTRATION; PERINEURAL AS NEEDED
Status: DISCONTINUED | OUTPATIENT
Start: 2024-03-05 | End: 2024-03-05 | Stop reason: SURG

## 2024-03-05 RX ORDER — PROPOFOL 10 MG/ML
VIAL (ML) INTRAVENOUS AS NEEDED
Status: DISCONTINUED | OUTPATIENT
Start: 2024-03-05 | End: 2024-03-05 | Stop reason: SURG

## 2024-03-05 RX ADMIN — SODIUM CHLORIDE, POTASSIUM CHLORIDE, SODIUM LACTATE AND CALCIUM CHLORIDE 30 ML/HR: 600; 310; 30; 20 INJECTION, SOLUTION INTRAVENOUS at 07:43

## 2024-03-05 RX ADMIN — PROPOFOL 20 MG: 10 INJECTION, EMULSION INTRAVENOUS at 08:07

## 2024-03-05 RX ADMIN — LIDOCAINE HYDROCHLORIDE 100 MG: 20 INJECTION, SOLUTION INFILTRATION; PERINEURAL at 08:06

## 2024-03-05 RX ADMIN — PROPOFOL 100 MG: 10 INJECTION, EMULSION INTRAVENOUS at 08:06

## 2024-03-05 NOTE — ANESTHESIA PREPROCEDURE EVALUATION
Anesthesia Evaluation     Patient summary reviewed and Nursing notes reviewed   history of anesthetic complications:  PONV               Airway   Mallampati: III  TM distance: <3 FB  Neck ROM: full  Possible difficult intubation  Dental      Pulmonary    (+) asthma,  Cardiovascular     ECG reviewed  Rhythm: regular  Rate: normal    (+) hypertension, valvular problems/murmurs, hyperlipidemia      Neuro/Psych- negative ROS  GI/Hepatic/Renal/Endo    (+) morbid obesity, GERD, renal disease- stones    Musculoskeletal     Abdominal    Substance History - negative use     OB/GYN negative ob/gyn ROS         Other   arthritis,                 Anesthesia Plan    ASA 3     MAC     (Grade 3 airway requiring bougie assist  for GETA    )  intravenous induction     Anesthetic plan, risks, benefits, and alternatives have been provided, discussed and informed consent has been obtained with: patient.    Plan discussed with CRNA.    CODE STATUS:

## 2024-03-05 NOTE — OP NOTE
Surgeon: Randal Calix Jr., M.D.    Preoperative Diagnosis: #1 dyspepsia #2 history of gastric sleeve    Postoperative Diagnosis: Gastritis    Procedure Performed: Transoral esophagogastroduodenoscopy with gastric antral biopsies    Indications: 53-year-old female status post sleeve gastrectomy August 2022 with some food cravings.  Patient presents for evaluation of her gastric sleeve    Procedure:     The procedure, indications, preparation and potential complications were explained to the patient, who indicated understanding and signed the corresponding consent forms.  The patient was identified, taken to the endoscopy suite, and placed on the left side down decubitus position.  The patient underwent a MAC anesthesia and was appropriately monitored through the case by the anesthesia personnel with continuous pulse oximetry, blood pressure, and cardiac monitoring.  A bite block was placed.  After adequate IV sedation and using a transoral technique a lubed flexible endoscope was placed in the hypopharynx and advanced to the second portion of the duodenum without difficulty. The scope was then withdrawn back into the antrum of the stomach.  Cold forcep biopsies of the antrum were taken to rule out Helicobacter pylori.  The scope was retroflexed noting the body, fundus and cardia.  The scope was then withdrawn back into the esophagus after decompressing the stomach.  The Z line was noted and GE junction measured from the incisors.  The scope was then completely withdrawn.  The patient tolerated the procedure well and left the endoscopy suite in stable condition.  The findings are listed below.    Duodenum: Unremarkable  Antrum: Minimal patchy erythema  Body/Fundus: Consistent with gastric sleeve without stricture or dilatation  Cardia: Consistent with gastric sleeve without dilatation  Esophagus: Z-line regular, no erosive esophagitis    Recommendations:     Await biopsy results and follow-up in the office

## 2024-03-05 NOTE — ANESTHESIA POSTPROCEDURE EVALUATION
Patient: Jessica Mcdowell    Procedure Summary       Date: 03/05/24 Room / Location: [facility] ENDOSCOPY 1 /  [facility]    Anesthesia Start: 0800 Anesthesia Stop: 0814    Procedure: ESOPHAGOGASTRODUODENOSCOPY WITH BIOPSY (Esophagus) Diagnosis:       Gastroesophageal reflux disease, unspecified whether esophagitis present      (Gastroesophageal reflux disease, unspecified whether esophagitis present [K21.9])    Surgeons: Randal Calix Jr., MD Provider: Jose De Jesus Rodriguez MD    Anesthesia Type: MAC ASA Status: 3            Anesthesia Type: MAC    Vitals  Vitals Value Taken Time   /90 03/05/24 0824   Temp     Pulse 76 03/05/24 0830   Resp 13 03/05/24 0823   SpO2 97 % 03/05/24 0830   Vitals shown include unfiled device data.        Post Anesthesia Care and Evaluation    Patient location during evaluation: PACU  Patient participation: complete - patient participated  Level of consciousness: awake and alert  Pain management: adequate    Airway patency: patent  Anesthetic complications: No anesthetic complications    Cardiovascular status: acceptable  Respiratory status: acceptable  Hydration status: acceptable    Comments: --------------------            03/05/24 0823     --------------------   BP:       139/90     Pulse:      80       Resp:       13       SpO2:      96%      --------------------

## 2024-03-06 LAB — UREASE TISS QL: NEGATIVE

## 2024-03-16 ENCOUNTER — HOSPITAL ENCOUNTER (EMERGENCY)
Facility: HOSPITAL | Age: 54
Discharge: HOME | End: 2024-03-16
Attending: STUDENT IN AN ORGANIZED HEALTH CARE EDUCATION/TRAINING PROGRAM
Payer: COMMERCIAL

## 2024-03-16 ENCOUNTER — APPOINTMENT (OUTPATIENT)
Dept: CT IMAGING | Facility: HOSPITAL | Age: 54
End: 2024-03-16
Payer: COMMERCIAL

## 2024-03-16 VITALS
BODY MASS INDEX: 37.44 KG/M2 | DIASTOLIC BLOOD PRESSURE: 102 MMHG | SYSTOLIC BLOOD PRESSURE: 161 MMHG | WEIGHT: 247 LBS | HEIGHT: 68 IN | RESPIRATION RATE: 18 BRPM | HEART RATE: 78 BPM | TEMPERATURE: 99.7 F | OXYGEN SATURATION: 99 %

## 2024-03-16 DIAGNOSIS — S09.90XA INJURY OF HEAD, INITIAL ENCOUNTER: ICD-10-CM

## 2024-03-16 DIAGNOSIS — M54.2 NECK PAIN: ICD-10-CM

## 2024-03-16 DIAGNOSIS — R59.9 ADENOPATHY: ICD-10-CM
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
## 2024-03-16 DIAGNOSIS — M54.9 ACUTE MIDLINE BACK PAIN, UNSPECIFIED BACK LOCATION: ICD-10-CM

## 2024-03-16 PROCEDURE — 70450 CT HEAD/BRAIN W/O DYE: CPT

## 2024-03-16 PROCEDURE — 72128 CT CHEST SPINE W/O DYE: CPT

## 2024-03-16 PROCEDURE — 72125 CT NECK SPINE W/O DYE: CPT

## 2024-03-16 PROCEDURE — 72131 CT LUMBAR SPINE W/O DYE: CPT

## 2024-03-16 PROCEDURE — 99284 EMERGENCY DEPT VISIT MOD MDM: CPT

## 2024-03-16 RX ORDER — METHOCARBAMOL 750 MG/1
750 TABLET, FILM COATED ORAL 3 TIMES DAILY PRN
Qty: 20 TABLET | Refills: 0 | Status: SHIPPED | OUTPATIENT
Start: 2024-03-16

## 2024-03-16 RX ORDER — NAPROXEN 500 MG/1
500 TABLET ORAL 2 TIMES DAILY PRN
Qty: 10 TABLET | Refills: 0 | Status: SHIPPED | OUTPATIENT
Start: 2024-03-16 | End: 2024-03-22
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
## 2024-03-16 NOTE — DISCHARGE INSTRUCTIONS
Please follow-up with your primary care physician within 1 to 2 weeks for repeat evaluation.  You were noted to have prominent lymph nodes throughout your chest and neck during your visit today.  Our radiology team recommends that you obtain CT chest abdomen and pelvis.  This may be done on an outpatient basis by your primary care provider.    Please return to the ER with new or worsening symptoms including but not limited to uncontrolled pain, lightheadedness, changes in mental status    Please take all medications as prescribed    Please do not drive or operate heavy machinery when using muscle relaxers.
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
## 2024-03-18 ENCOUNTER — TELEPHONE (OUTPATIENT)
Dept: FAMILY MEDICINE CLINIC | Facility: CLINIC | Age: 54
End: 2024-03-18

## 2024-03-18 NOTE — TELEPHONE ENCOUNTER
Caller:     Relationship:     Best call back number: [phone]    What is the best time to reach you: ANYTIME BETWEEN 2:30 PM AND 3:00 PM     Who are you requesting to speak with (clinical staff, provider,  specific staff member): CLINICAL STAFF     Do you know the name of the person who called: SELF     What was the call regarding: PATIENT CALLED AND STATED HER CT SCAN RESULTS SHOWED LYMPH NODES IN THROAT CHEST AND ABDOMINAL AREAS ARE INFLAMMED. PLEASE CALL     Is it okay if the provider responds through MyChart: YES

## 2024-03-20 ENCOUNTER — TELEPHONE (OUTPATIENT)
Dept: FAMILY MEDICINE CLINIC | Facility: CLINIC | Age: 54
End: 2024-03-20
Payer: COMMERCIAL

## 2024-03-20 NOTE — TELEPHONE ENCOUNTER
HUB TO RELAY    PLEASE SCHEDULE PATIENT FOR TCM WHEN AND IF SHE CALL BACK FOR CT SCAN RESULTS AND CONCERNS

## 2024-03-22 ENCOUNTER — OFFICE VISIT (OUTPATIENT)
Dept: FAMILY MEDICINE CLINIC | Facility: CLINIC | Age: 54
End: 2024-03-22
Payer: COMMERCIAL

## 2024-03-22 VITALS
TEMPERATURE: 98.5 F | OXYGEN SATURATION: 94 % | WEIGHT: 248 LBS | SYSTOLIC BLOOD PRESSURE: 162 MMHG | HEIGHT: 68 IN | BODY MASS INDEX: 37.59 KG/M2 | DIASTOLIC BLOOD PRESSURE: 100 MMHG | HEART RATE: 77 BPM

## 2024-03-22 DIAGNOSIS — I10 PRIMARY HYPERTENSION: Primary | Chronic | ICD-10-CM

## 2024-03-22 DIAGNOSIS — J06.9 VIRAL URI WITH COUGH: ICD-10-CM

## 2024-03-22 DIAGNOSIS — D50.8 IRON DEFICIENCY ANEMIA SECONDARY TO INADEQUATE DIETARY IRON INTAKE: ICD-10-CM

## 2024-03-22 DIAGNOSIS — E78.1 HYPERTRIGLYCERIDEMIA: Chronic | ICD-10-CM

## 2024-03-22 DIAGNOSIS — K92.1 BLOOD IN STOOL: ICD-10-CM

## 2024-03-22 DIAGNOSIS — Z87.19 HISTORY OF HEMORRHOIDS: ICD-10-CM

## 2024-03-22 LAB
EXPIRATION DATE: NORMAL
INTERNAL CONTROL: NORMAL
Lab: NORMAL
S PYO AG THROAT QL: NEGATIVE

## 2024-03-22 PROCEDURE — 99214 OFFICE O/P EST MOD 30 MIN: CPT | Performed by: NURSE PRACTITIONER

## 2024-03-22 RX ORDER — CYCLOBENZAPRINE HCL 10 MG
10 TABLET ORAL 2 TIMES DAILY PRN
COMMUNITY
Start: 2024-02-22

## 2024-03-22 RX ORDER — BENZONATATE 100 MG/1
100 CAPSULE ORAL 3 TIMES DAILY PRN
Qty: 30 CAPSULE | Refills: 0 | Status: SHIPPED | OUTPATIENT
Start: 2024-03-22

## 2024-03-22 RX ORDER — AZITHROMYCIN 250 MG/1
250 TABLET, FILM COATED ORAL DAILY
COMMUNITY
Start: 2024-03-19 | End: 2024-03-22

## 2024-03-22 RX ORDER — GUAIFENESIN 600 MG/1
1200 TABLET, EXTENDED RELEASE ORAL 2 TIMES DAILY
Qty: 40 TABLET | Refills: 0 | Status: SHIPPED | OUTPATIENT
Start: 2024-03-22

## 2024-03-22 RX ORDER — LIDOCAINE 50 MG/G
1 PATCH TOPICAL EVERY 24 HOURS
COMMUNITY
Start: 2024-02-22
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
## 2024-03-23 PROBLEM — J06.9 VIRAL URI WITH COUGH: Status: ACTIVE | Noted: 2024-03-23

## 2024-03-23 PROBLEM — Z87.19 HISTORY OF HEMORRHOIDS: Status: ACTIVE | Noted: 2024-03-23

## 2024-03-23 NOTE — ASSESSMENT & PLAN NOTE
POCT Rapid Strep Test result Negative.  Take Tylenol (OTC) as directed, as needed for pain, body aches, sore throat, headache & fever.  Gargle with warm salt water as needed for throat pain.  Rx: Mucinex - take with 8oz water and increase water throughout the day.  Rx:  Dextro/Tessalon as directed, as needed for cough.  Discussed taking medication consistently, as directed, for good results.

## 2024-03-23 NOTE — ASSESSMENT & PLAN NOTE
Low Hgb on labs from 11/8/23.  Encouraged to eat foods high in iron.  Lab:  CBC  Referral to GI for eval/treat.
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
## 2024-03-23 NOTE — ASSESSMENT & PLAN NOTE
C/O bright red blood in toilet water.  Denies having hemorrhoid today.  Referral to GI specialist for eval/treat.

## 2024-03-26 DIAGNOSIS — J02.9 SORE THROAT: Primary | ICD-10-CM

## 2024-03-26 PROCEDURE — 87880 STREP A ASSAY W/OPTIC: CPT | Performed by: NURSE PRACTITIONER

## 2024-03-29 ENCOUNTER — OFFICE VISIT (OUTPATIENT)
Dept: BARIATRICS/WEIGHT MGMT | Facility: CLINIC | Age: 54
End: 2024-03-29
Payer: COMMERCIAL

## 2024-03-29 VITALS
HEIGHT: 68 IN | DIASTOLIC BLOOD PRESSURE: 98 MMHG | WEIGHT: 251 LBS | TEMPERATURE: 97.7 F | HEART RATE: 71 BPM | BODY MASS INDEX: 38.04 KG/M2 | SYSTOLIC BLOOD PRESSURE: 144 MMHG

## 2024-03-29 DIAGNOSIS — Z98.84 S/P LAPAROSCOPIC SLEEVE GASTRECTOMY: ICD-10-CM

## 2024-03-29 DIAGNOSIS — Z71.3 DIETARY COUNSELING: ICD-10-CM

## 2024-03-29 DIAGNOSIS — R63.5 UNINTENDED WEIGHT GAIN: ICD-10-CM

## 2024-03-29 DIAGNOSIS — R53.82 CHRONIC FATIGUE: ICD-10-CM

## 2024-03-29 DIAGNOSIS — E66.9 OBESITY, CLASS II, BMI 35-39.9: Primary | ICD-10-CM

## 2024-03-29 PROCEDURE — 99214 OFFICE O/P EST MOD 30 MIN: CPT | Performed by: SURGERY

## 2024-03-29 NOTE — PROGRESS NOTES
[facility] BARIATR[facility] BARIATRIC SURGERY  [address]  [facility] [id number] [phone]  [address]  [facility] [id number] [phone]  Dept: [phone]  3/29/2024      Jessica Mcdowell.  [id number]  [id number]  1970  female      Chief Complaint   Patient presents with    Follow-up     Sleeve / EGD follow up        BH Post-Op Bariatric Surgery:   Jessica Mcdowell is status post Laparoscopic Sleeve procedure, performed on 8/3/22     HPI:   Today's weight is 114 kg (251 lb) pounds, today's BMI is Body mass index is 38.17 kg/m²., a  gain of 8 pounds since the last visit and weight loss since surgery is 27 pounds.    Jessica Mcdowell denies nausea vomiting fever chills chest pain shortness of air abdominal pain and reports not doing well with her gastric sleeve.  She feels like she needs a bigger tool to help with her weight loss goals.  No complaints currently.     Diet and Exercise: Diet history reviewed and discussed with the patient. Weight loss/gains to date discussed with the patient. The patient states they are eating around unknown grams of protein per day. She reports eating 3 meals per day, a typical portion size of 1 cup, eating 1 snacks per day, drinking 5 or more 8-oz. glasses of water per day, no carbonated beverage consumption and exercising regularly.     Supplements: Bariatric multivitamin with iron, B12.     Review of Systems   Constitutional:  Positive for fatigue.   Gastrointestinal:  Positive for constipation.   Musculoskeletal:  Positive for arthralgias.   All other systems reviewed and are negative.        * No active hospital problems. *      Past Medical History:   Diagnosis Date    Asthma     Eczema     History of anemia     History of gout     History of hemorrhoids     Hypertension     Kidney stone     history    Leiomyoma     Murmur, heart     PONV (postoperative nausea and vomiting)     Rheumatoid arthritis  02/16/2022       The following portions of the patient's history were reviewed and updated as appropriate: allergies, current medications, past family history, past medical history, past social history, past surgical history, and problem list.    Vitals:    03/29/24 0815   BP: 144/98   Pulse: 71   Temp: 97.7 °F (36.5 °C)       Physical Exam  Constitutional:       Appearance: Normal appearance.   HENT:      Head: Normocephalic and atraumatic.   Eyes:      Conjunctiva/sclera: Conjunctivae normal.   Pulmonary:      Effort: Pulmonary effort is normal.   Abdominal:      General: Abdomen is flat. There is no distension.      Palpations: Abdomen is soft. There is no mass.      Tenderness: There is no abdominal tenderness. There is no guarding or rebound.      Hernia: No hernia is present.   Neurological:      Mental Status: She is alert and oriented to person, place, and time.   Psychiatric:         Mood and Affect: Mood normal.         Behavior: Behavior normal.         Thought Content: Thought content normal.         Judgment: Judgment normal.         Assessment:   Post-op, the patient status post sleeve gastrectomy August 2022 who has not done well with her weight loss.  She feels like she needs a bigger tool to achieve her weight loss goals.  She is interested in conversion to Radhika-en-Y gastric bypass.  I did discuss all of the different conversion procedures and all questions answered and patient feels like the bypass is the better procedure no real heartburn.  Patient understands that she cannot take NSAIDs after this procedure.  Patient does not smoke will proceed with intake process.     Encounter Diagnoses   Name Primary?    Obesity, Class II, BMI 35-39.9 Yes    S/P laparoscopic sleeve gastrectomy     Unintended weight gain     Dietary counseling     Chronic fatigue        Plan:     Encouraged patient to be sure to get plenty of lean protein per day through small meals all with a protein source.   Activity  restrictions: none.   Recommended patient be sure to get at least 70 grams of protein per day by eating small  meals all with high lean protein choices. Be sure to limit/cut back on daily carbohydrate intake. Discussed with the patient the recommended amount of water per day to intake- half of body weight in ounces. Reviewed vitamin requirements. Be sure to do routine exercise, 150 minutes per week minimum, including both cardio and strength training.     Instructions / Recommendations: dietary counseling recommended, recommended a daily protein intake of  grams, vitamin supplement(s) recommended, recommended exercising at least 150 minutes per week, behavior modifications recommended and instructed to call the office for concerns, questions, or problems.     The patient was instructed to follow up in intake.     The patient was counseled regarding the above procedure. Total time spent on this encounter was  30 minutes including reviewing previous notes, lab results and face to face time spent with the patient.  The majority of time was spent counseling the patient regarding diet and exercise as well as reviewing their medications and their compliance with the procedure.

## 2024-04-02 ENCOUNTER — OFFICE VISIT (OUTPATIENT)
Dept: FAMILY MEDICINE CLINIC | Facility: CLINIC | Age: 54
End: 2024-04-02
Payer: COMMERCIAL

## 2024-04-02 VITALS
HEART RATE: 78 BPM | SYSTOLIC BLOOD PRESSURE: 118 MMHG | OXYGEN SATURATION: 100 % | HEIGHT: 68 IN | DIASTOLIC BLOOD PRESSURE: 82 MMHG | WEIGHT: 252 LBS | BODY MASS INDEX: 38.19 KG/M2 | RESPIRATION RATE: 18 BRPM

## 2024-04-02 DIAGNOSIS — I10 PRIMARY HYPERTENSION: Primary | Chronic | ICD-10-CM

## 2024-04-02 DIAGNOSIS — E78.1 HYPERTRIGLYCERIDEMIA: Chronic | ICD-10-CM

## 2024-04-02 DIAGNOSIS — E55.9 VITAMIN D DEFICIENCY: Chronic | ICD-10-CM

## 2024-04-02 DIAGNOSIS — R74.8 ELEVATED SERUM ALKALINE PHOSPHATASE LEVEL: ICD-10-CM

## 2024-04-02 DIAGNOSIS — R31.9 HEMATURIA, UNSPECIFIED TYPE: ICD-10-CM

## 2024-04-02 DIAGNOSIS — D50.8 IRON DEFICIENCY ANEMIA SECONDARY TO INADEQUATE DIETARY IRON INTAKE: Chronic | ICD-10-CM

## 2024-04-02 DIAGNOSIS — R79.89 ELEVATED SERUM CREATININE: ICD-10-CM

## 2024-04-02 PROBLEM — R31.0 GROSS HEMATURIA: Status: ACTIVE | Noted: 2024-04-02

## 2024-04-02 PROBLEM — Z86.39 HISTORY OF VITAMIN D DEFICIENCY: Status: RESOLVED | Noted: 2024-04-02 | Resolved: 2024-04-02

## 2024-04-02 PROBLEM — J06.9 VIRAL URI WITH COUGH: Status: RESOLVED | Noted: 2024-03-23 | Resolved: 2024-04-02

## 2024-04-02 PROBLEM — R31.0 GROSS HEMATURIA: Status: RESOLVED | Noted: 2024-04-02 | Resolved: 2024-04-02

## 2024-04-02 PROBLEM — Z86.39 HISTORY OF VITAMIN D DEFICIENCY: Status: ACTIVE | Noted: 2024-04-02

## 2024-04-02 LAB
BILIRUB BLD-MCNC: NEGATIVE MG/DL
CLARITY, POC: ABNORMAL
COLOR UR: YELLOW
EXPIRATION DATE: ABNORMAL
GLUCOSE UR STRIP-MCNC: NEGATIVE MG/DL
KETONES UR QL: NEGATIVE
LEUKOCYTE EST, POC: NEGATIVE
Lab: ABNORMAL
NITRITE UR-MCNC: NEGATIVE MG/ML
PH UR: 6 [PH] (ref 5–8)
PROT UR STRIP-MCNC: NEGATIVE MG/DL
RBC # UR STRIP: ABNORMAL /UL
SP GR UR: 1.02 (ref 1–1.03)
UROBILINOGEN UR QL: NORMAL

## 2024-04-02 NOTE — ASSESSMENT & PLAN NOTE
S/P gastric sleeve; continue follow-up with Dr. Calix for continued counseling on recommended dietary supplements needed post gastric sleeve procedure.  Continue Vitamin D 1000units daily.  Lifestyle changes - diet/exercise.  Will continue to monitor.

## 2024-04-02 NOTE — ASSESSMENT & PLAN NOTE
She had gastric sleeve in 8/2022 with Dr. Calix.  She had recent appointment with Dr. Calix on 3/29/24 and he advised patient to take Bariatric Multivitamin with Iron and Vitamin B12  She reports that she has not gotten the multivitamin with iron from his office at this time.  She reports taking B12 but cannot confirm the dose.  She is not sure if she is taking B12 100mcg as listed in the chart.  Referral placed to GI on 3/22/24 but patient has not returned their call to schedule colon screening r/t blood in stool w/history of hemorrhoids.  She agreed to call and make appointment with GI.  She also agreed to follow-up with Dr. Calix regarding continued Iron Deficiency Anemia.  Will continue to monitor.

## 2024-04-02 NOTE — ASSESSMENT & PLAN NOTE
Vitamin D low on prior lab.  Taken Vitamin D 71630 units weekly for 12 weeks.  Start taking Vitamin D 1000units daily.  Will re-assess Vit D level next visit.

## 2024-04-02 NOTE — ASSESSMENT & PLAN NOTE
3+ Hematuria on prior U/A from 12/2023.  Order:  U/A, result showed 3+ blood in urine today.  Order:  Urine Culture  Referral to Urology for evaluation & treatment.

## 2024-04-02 NOTE — ASSESSMENT & PLAN NOTE
Triglycerides elevated on recent labs.  Encouraged lifestyle changes, decrease sugar/carbs/ETOH.  Will re-assess lipids at next visit.

## 2024-04-02 NOTE — ASSESSMENT & PLAN NOTE
Hypertension is controlled on Lisinopril 10mg daily and Indapamide 2.5mg daily.  Continue current treatment plan.  Decrease table salt and foods high in salt.  Weight loss.  Increase activity daily.  Patient to discuss concern about Indapamide causing headaches.  Advised patient to drink water and decrease caffeine intake.  Recommended HCTZ and declined r/t history of HCTZ causing cancer.  Blood pressure will be re-assessed in 3 months.

## 2024-04-02 NOTE — PROGRESS NOTES
Subjective          Jessica Mcdowell presents to [facility] for Hypertension (Review test results)   History of Present Illness    She is here to follow-up on Hypertension, Hyperlipidemia and to go over recent lab results:    Hypertension - she took her BP medication today.  She stopped taking Naproxen and all other NSAID products since previous visit.  She reports her diuretic gives her a headache.  She advised Cardiology of this medication causing her headaches but medication was not changed.  She reports drinking only 1/2 cup of coffee that is diluted with decaf in the afternoon.  She denies drinking soda or tea.  She reports drinking plenty of water daily.   She denies chest pain, heart palpitations, and syncope.  She does not want to switch to HCTZ due to this medication having history of reports of causing cancer.  She agreed to follow-up with Cardiology regarding continued headaches with indapamide.    Creatinine - blood level elevated on recent labs.  Discussed importance of stopping NSAID use.    Hypertriglyceridemia - elevated on recent labs.  Recommended decreasing sugar/carbs/ETOH intake.    Elevated Alk Phos - advised patient that this lab is slightly elevated on recent labs.  Informed patient that this lab has to do with the liver and bone.  Discussed her prior elevation of Liver enzyme (AST).  She denies chronic ETOH use.  She reports only drinking ETOH once every 6 months.  Encouraged to make lifestyle changes.  She has history of having gastric sleeve in 8/2022 and discussed importance of following up with Dr. Calix regarding necessary supplements she should be taking post gastric sleeve procedure to maintain proper nutritional absorption and she agreed.  Will continue to monitor.    She reports having low Vitamin D level in the past.  She was prescribed Vitamin D 66085 units weekly for 12 weeks.  Recommended she take OTC Vitamin D3 1000units daily.  Will continue to  "monitor.    Iron Deficiency Anemia -  she had gastric sleeve in 8/2022 with Dr. Calix.  She had recent appointment with Dr. Calix on 3/29/24 and he advised patient to take Bariatric Multivitamin with Iron and Vitamin B12. She reports that she has not gotten the multivitamin with iron from his office at this time.  She reports taking B12 but cannot confirm the dose.  She is not sure if she is taking B12 100mcg as listed in the chart.  Referral placed to GI on 3/22/24 but patient has not returned their call to schedule colon screening r/t blood in stool w/history of hemorrhoids.  She agreed to call and make appointment with GI.  She also agreed to follow-up with Dr. Calix regarding continued Iron Deficiency Anemia.  Will continue to monitor.    Hematuria - she had 3+ blood in urine in 12/2023.  She denies seeing blood in her urine.  She denies dysuria, frequency and urgency with urination.  She denies being sexually active and declined need for STD testing.    Review of Systems       Objective   Vital Signs:   /82 (BP Location: Right arm, Patient Position: Sitting, Cuff Size: Adult)   Pulse 78   Resp 18   Ht 172.7 cm (67.99\")   Wt 114 kg (252 lb)   SpO2 100%   BMI 38.33 kg/m²           Physical Exam  Vitals and nursing note reviewed.   Constitutional:       General: She is not in acute distress.     Appearance: Normal appearance.   HENT:      Head: Normocephalic and atraumatic.   Cardiovascular:      Rate and Rhythm: Normal rate and regular rhythm.      Heart sounds: Normal heart sounds. No murmur heard.  Pulmonary:      Effort: Pulmonary effort is normal. No respiratory distress.      Breath sounds: Normal breath sounds. No wheezing or rales.   Abdominal:      General: Bowel sounds are normal.      Palpations: Abdomen is soft.      Tenderness: There is no abdominal tenderness. There is no right CVA tenderness or left CVA tenderness.   Musculoskeletal:      Right lower leg: No edema.      Left lower " leg: No edema.   Skin:     General: Skin is warm and dry.      Findings: No erythema.   Neurological:      Mental Status: She is alert.   Psychiatric:         Mood and Affect: Mood normal.        Result Review :                 Assessment and Plan    Diagnoses and all orders for this visit:    1. Primary hypertension (Primary)  Assessment & Plan:  Hypertension is controlled on Lisinopril 10mg daily and Indapamide 2.5mg daily.  Continue current treatment plan.  Decrease table salt and foods high in salt.  Weight loss.  Increase activity daily.  Patient to discuss concern about Indapamide causing headaches.  Advised patient to drink water and decrease caffeine intake.  Recommended HCTZ and declined r/t history of HCTZ causing cancer.  Blood pressure will be re-assessed in 3 months.        2. Hypertriglyceridemia  Assessment & Plan:  Triglycerides elevated on recent labs.  Encouraged lifestyle changes, decrease sugar/carbs/ETOH.  Will re-assess lipids at next visit.        3. Iron deficiency anemia secondary to inadequate dietary iron intake  Assessment & Plan:  She had gastric sleeve in 8/2022 with Dr. Calix.  She had recent appointment with Dr. Calix on 3/29/24 and he advised patient to take Bariatric Multivitamin with Iron and Vitamin B12  She reports that she has not gotten the multivitamin with iron from his office at this time.  She reports taking B12 but cannot confirm the dose.  She is not sure if she is taking B12 100mcg as listed in the chart.  Referral placed to GI on 3/22/24 but patient has not returned their call to schedule colon screening r/t blood in stool w/history of hemorrhoids.  She agreed to call and make appointment with GI.  She also agreed to follow-up with Dr. Calix regarding continued Iron Deficiency Anemia.  Will continue to monitor.      4. Hematuria, unspecified type  Assessment & Plan:  3+ Hematuria on prior U/A from 12/2023.  Order:  U/A, result showed 3+ blood in urine today.  Order:   Urine Culture  Referral to Urology for evaluation & treatment.    Orders:  -     POCT urinalysis dipstick, automated  -     Urine Culture - Urine, Urine, Clean Catch; Future  -     Ambulatory Referral to Urology  -     Urine Culture - Urine, Urine, Clean Catch    5. Elevated serum creatinine  Assessment & Plan:  Slightly elevated on recent labs.  STOP all NSAIDs.  Will continue to monitor.      6. Elevated serum alkaline phosphatase level  Assessment & Plan:  S/P gastric sleeve; continue follow-up with Dr. Calix for continued counseling on recommended dietary supplements needed post gastric sleeve procedure.  Continue Vitamin D 1000units daily.  Lifestyle changes - diet/exercise.  Will continue to monitor.        7. Vitamin D deficiency  Assessment & Plan:  Vitamin D low on prior lab.  Taken Vitamin D 97684 units weekly for 12 weeks.  Start taking Vitamin D 1000units daily.  Will re-assess Vit D level next visit.          Follow Up   Return in about 3 months (around 7/2/2024) for Next scheduled follow up HTN, Hypertriglyceridemia, Anemia, Vit D Defic, Elev Alk Phos, Hematuria.  Patient was given instructions and counseling regarding her condition or for health maintenance advice. Please see specific information pulled into the AVS if appropriate.

## 2024-04-04 LAB
BACTERIA UR CULT: NORMAL
BACTERIA UR CULT: NORMAL

## 2024-04-12 ENCOUNTER — HOSPITAL ENCOUNTER (EMERGENCY)
Facility: HOSPITAL | Age: 54
Discharge: HOME | End: 2024-04-12
Attending: EMERGENCY MEDICINE
Payer: COMMERCIAL

## 2024-04-12 ENCOUNTER — APPOINTMENT (OUTPATIENT)
Dept: GENERAL RADIOLOGY | Facility: HOSPITAL | Age: 54
End: 2024-04-12
Payer: COMMERCIAL

## 2024-04-12 VITALS
OXYGEN SATURATION: 100 % | HEART RATE: 120 BPM | DIASTOLIC BLOOD PRESSURE: 109 MMHG | WEIGHT: 251.32 LBS | BODY MASS INDEX: 38.09 KG/M2 | RESPIRATION RATE: 18 BRPM | TEMPERATURE: 98.6 F | SYSTOLIC BLOOD PRESSURE: 163 MMHG | HEIGHT: 68 IN

## 2024-04-12 DIAGNOSIS — S46.912A STRAIN OF LEFT SHOULDER, INITIAL ENCOUNTER: ICD-10-CM
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
## 2024-04-12 DIAGNOSIS — S16.1XXA STRAIN OF NECK MUSCLE, INITIAL ENCOUNTER: ICD-10-CM

## 2024-04-12 PROCEDURE — 72050 X-RAY EXAM NECK SPINE 4/5VWS: CPT

## 2024-04-12 PROCEDURE — 73030 X-RAY EXAM OF SHOULDER: CPT

## 2024-04-12 PROCEDURE — 99283 EMERGENCY DEPT VISIT LOW MDM: CPT

## 2024-04-12 RX ORDER — CYCLOBENZAPRINE HCL 10 MG
10 TABLET ORAL 3 TIMES DAILY PRN
Qty: 21 TABLET | Refills: 0 | Status: SHIPPED | OUTPATIENT
Start: 2024-04-12

## 2024-04-12 RX ORDER — DICLOFENAC SODIUM 75 MG/1
75 TABLET, DELAYED RELEASE ORAL 2 TIMES DAILY PRN
Qty: 20 TABLET | Refills: 0 | Status: SHIPPED | OUTPATIENT
Start: 2024-04-12
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
## 2024-04-29 ENCOUNTER — CONSULT (OUTPATIENT)
Dept: BARIATRICS/WEIGHT MGMT | Facility: CLINIC | Age: 54
End: 2024-04-29
Payer: COMMERCIAL

## 2024-04-29 ENCOUNTER — TRANSCRIBE ORDERS (OUTPATIENT)
Dept: CARDIOLOGY | Facility: HOSPITAL | Age: 54
End: 2024-04-29
Payer: COMMERCIAL

## 2024-04-29 ENCOUNTER — HOSPITAL ENCOUNTER (OUTPATIENT)
Dept: GENERAL RADIOLOGY | Facility: HOSPITAL | Age: 54
Discharge: HOME | End: 2024-04-29
Payer: COMMERCIAL

## 2024-04-29 ENCOUNTER — LAB (OUTPATIENT)
Dept: LAB | Facility: HOSPITAL | Age: 54
End: 2024-04-29
Payer: COMMERCIAL

## 2024-04-29 ENCOUNTER — HOSPITAL ENCOUNTER (OUTPATIENT)
Dept: CARDIOLOGY | Facility: HOSPITAL | Age: 54
Discharge: HOME | End: 2024-04-29
Payer: COMMERCIAL

## 2024-04-29 VITALS
BODY MASS INDEX: 38.65 KG/M2 | HEIGHT: 68 IN | SYSTOLIC BLOOD PRESSURE: 158 MMHG | HEART RATE: 82 BPM | DIASTOLIC BLOOD PRESSURE: 90 MMHG | WEIGHT: 255 LBS | TEMPERATURE: 98.2 F

## 2024-04-29 DIAGNOSIS — R73.03 PREDIABETES: ICD-10-CM

## 2024-04-29 DIAGNOSIS — M25.562 CHRONIC PAIN OF BOTH KNEES: ICD-10-CM

## 2024-04-29 DIAGNOSIS — I10 PRIMARY HYPERTENSION: ICD-10-CM

## 2024-04-29 DIAGNOSIS — K21.9 GASTROESOPHAGEAL REFLUX DISEASE, UNSPECIFIED WHETHER ESOPHAGITIS PRESENT: ICD-10-CM

## 2024-04-29 DIAGNOSIS — E66.9 OBESITY, CLASS II, BMI 35-39.9: ICD-10-CM

## 2024-04-29 DIAGNOSIS — Z98.84 S/P LAPAROSCOPIC SLEEVE GASTRECTOMY: ICD-10-CM

## 2024-04-29 DIAGNOSIS — E66.9 OBESITY, CLASS I, BMI 30-34.9: ICD-10-CM

## 2024-04-29 DIAGNOSIS — G89.29 CHRONIC PAIN OF BOTH KNEES: ICD-10-CM

## 2024-04-29 DIAGNOSIS — R60.0 LOCALIZED EDEMA: ICD-10-CM

## 2024-04-29 DIAGNOSIS — M25.561 CHRONIC PAIN OF BOTH KNEES: ICD-10-CM

## 2024-04-29 DIAGNOSIS — J45.20 MILD INTERMITTENT ASTHMA WITHOUT COMPLICATION: ICD-10-CM

## 2024-04-29 DIAGNOSIS — E55.9 VITAMIN D DEFICIENCY: ICD-10-CM

## 2024-04-29 DIAGNOSIS — Z71.3 DIETARY COUNSELING: ICD-10-CM

## 2024-04-29 DIAGNOSIS — Z71.3 DIETARY COUNSELING: Primary | ICD-10-CM

## 2024-04-29 DIAGNOSIS — K44.9 HIATAL HERNIA: ICD-10-CM

## 2024-04-29 LAB
25(OH)D3 SERPL-MCNC: 32 NG/ML (ref 30–100)
ALBUMIN SERPL-MCNC: 4.2 G/DL (ref 3.5–5.2)
ALBUMIN/GLOB SERPL: 1.2 G/DL
ALP SERPL-CCNC: 129 U/L (ref 39–117)
ALT SERPL W P-5'-P-CCNC: 16 U/L (ref 1–33)
ANION GAP SERPL CALCULATED.3IONS-SCNC: 9 MMOL/L (ref 5–15)
AST SERPL-CCNC: 20 U/L (ref 1–32)
BASOPHILS # BLD AUTO: 0.02 10*3/MM3 (ref 0–0.2)
BASOPHILS NFR BLD AUTO: 0.5 % (ref 0–1.5)
BILIRUB SERPL-MCNC: 0.7 MG/DL (ref 0–1.2)
BUN SERPL-MCNC: 13 MG/DL (ref 6–20)
BUN/CREAT SERPL: 14.9 (ref 7–25)
CALCIUM SPEC-SCNC: 9.4 MG/DL (ref 8.6–10.5)
CHLORIDE SERPL-SCNC: 104 MMOL/L (ref 98–107)
CO2 SERPL-SCNC: 28 MMOL/L (ref 22–29)
CREAT SERPL-MCNC: 0.87 MG/DL (ref 0.57–1)
DEPRECATED RDW RBC AUTO: 43.9 FL (ref 37–54)
EGFRCR SERPLBLD CKD-EPI 2021: 80.8 ML/MIN/1.73
EOSINOPHIL # BLD AUTO: 0.12 10*3/MM3 (ref 0–0.4)
EOSINOPHIL NFR BLD AUTO: 3 % (ref 0.3–6.2)
ERYTHROCYTE [DISTWIDTH] IN BLOOD BY AUTOMATED COUNT: 15.2 % (ref 12.3–15.4)
FERRITIN SERPL-MCNC: 258 NG/ML (ref 13–150)
FOLATE SERPL-MCNC: 10.5 NG/ML (ref 4.78–24.2)
GLOBULIN UR ELPH-MCNC: 3.5 GM/DL
GLUCOSE SERPL-MCNC: 126 MG/DL (ref 65–99)
HCT VFR BLD AUTO: 36.7 % (ref 34–46.6)
HGB BLD-MCNC: 11.6 G/DL (ref 12–15.9)
IMM GRANULOCYTES # BLD AUTO: 0.03 10*3/MM3 (ref 0–0.05)
IMM GRANULOCYTES NFR BLD AUTO: 0.7 % (ref 0–0.5)
IRON 24H UR-MRATE: 56 MCG/DL (ref 37–145)
LYMPHOCYTES # BLD AUTO: 0.68 10*3/MM3 (ref 0.7–3.1)
LYMPHOCYTES NFR BLD AUTO: 16.9 % (ref 19.6–45.3)
MCH RBC QN AUTO: 25.3 PG (ref 26.6–33)
MCHC RBC AUTO-ENTMCNC: 31.6 G/DL (ref 31.5–35.7)
MCV RBC AUTO: 80.1 FL (ref 79–97)
MONOCYTES # BLD AUTO: 0.26 10*3/MM3 (ref 0.1–0.9)
MONOCYTES NFR BLD AUTO: 6.5 % (ref 5–12)
NEUTROPHILS NFR BLD AUTO: 2.92 10*3/MM3 (ref 1.7–7)
NEUTROPHILS NFR BLD AUTO: 72.4 % (ref 42.7–76)
NRBC BLD AUTO-RTO: 0 /100 WBC (ref 0–0.2)
PLATELET # BLD AUTO: 143 10*3/MM3 (ref 140–450)
PMV BLD AUTO: 11.1 FL (ref 6–12)
POTASSIUM SERPL-SCNC: 3.5 MMOL/L (ref 3.5–5.2)
PREALB SERPL-MCNC: 19.3 MG/DL (ref 20–40)
PROT SERPL-MCNC: 7.7 G/DL (ref 6–8.5)
QT INTERVAL: 369 MS
QTC INTERVAL: 410 MS
RBC # BLD AUTO: 4.58 10*6/MM3 (ref 3.77–5.28)
SODIUM SERPL-SCNC: 141 MMOL/L (ref 136–145)
WBC NRBC COR # BLD AUTO: 4.03 10*3/MM3 (ref 3.4–10.8)

## 2024-04-29 PROCEDURE — 82306 VITAMIN D 25 HYDROXY: CPT

## 2024-04-29 PROCEDURE — 71046 X-RAY EXAM CHEST 2 VIEWS: CPT

## 2024-04-29 PROCEDURE — 99215 OFFICE O/P EST HI 40 MIN: CPT | Performed by: NURSE PRACTITIONER

## 2024-04-29 PROCEDURE — 83921 ORGANIC ACID SINGLE QUANT: CPT

## 2024-04-29 PROCEDURE — 80053 COMPREHEN METABOLIC PANEL: CPT

## 2024-04-29 PROCEDURE — 83540 ASSAY OF IRON: CPT

## 2024-04-29 PROCEDURE — 85025 COMPLETE CBC W/AUTO DIFF WBC: CPT

## 2024-04-29 PROCEDURE — 82746 ASSAY OF FOLIC ACID SERUM: CPT

## 2024-04-29 PROCEDURE — 93005 ELECTROCARDIOGRAM TRACING: CPT | Performed by: NURSE PRACTITIONER

## 2024-04-29 PROCEDURE — 99417 PROLNG OP E/M EACH 15 MIN: CPT | Performed by: NURSE PRACTITIONER

## 2024-04-29 PROCEDURE — 82728 ASSAY OF FERRITIN: CPT

## 2024-04-29 PROCEDURE — 84134 ASSAY OF PREALBUMIN: CPT

## 2024-04-29 PROCEDURE — 84425 ASSAY OF VITAMIN B-1: CPT

## 2024-04-29 PROCEDURE — 36415 COLL VENOUS BLD VENIPUNCTURE: CPT

## 2024-04-29 NOTE — PROGRESS NOTES
[facility]IATRIC SURGERY  [address]  [facility] [id number] [phone]  [address]  [facility] [id number] [phone]  Dept: [phone]  4/29/2024      Jessica Mcdowell.  [id number]  [id number]  1970  female      Chief Complaint of weight gain; unable to maintain weight loss    History of Present Illness:   Jessica is a 53 y.o. female who presents today for evaluation, education and consultation regarding weight loss surgery. The patient is interested in the gastric bypass.      Diet History: Jessica has been overweight for at least 15 years, has been 35 pounds or more overweight for at least 15 years and started dieting at age 42.  The most weight Jessica lost was 115 pounds on physician supervised weight loss and maintained the weight loss for 5 years. Jessica describes her eating habits as snacking between meals, overeating, and eating sweets. Jessica Mcdowell has tried Atkins, Physician monitored, exercising, prescription medications, sleeve gastrectomy and meal replacement shakes among others with success of losing up to 115 pounds, but in each instance regained the weight.       Bariatric Surgery Evaluation: The patient is being seen for an initial visit for bariatric surgery evaluation and education.     Bariatric Co-morbidities:  hypertension, cardiovascular disease, knee pain, GERD, asthma, and edema    Patient Active Problem List   Diagnosis    Iron deficiency anemia secondary to inadequate dietary iron intake    HTN (hypertension)    Seasonal allergies    Unintended weight gain    Chronic fatigue    Heart murmur    Asthma    IBS (irritable bowel syndrome)    Hemorrhoids    History of kidney stones    Chronic pain of both knees    Rheumatoid arthritis    Gout    Prediabetes    History of heart attack    Localized edema    Left leg swelling    Obesity, Class II, BMI 35-39.9    S/P laparoscopic sleeve gastrectomy     Constipation    Fluid retention    Hypertriglyceridemia    Gastroesophageal reflux disease    History of hemorrhoids    Elevated serum creatinine    Elevated serum alkaline phosphatase level    Vitamin D deficiency    Hematuria       Past Medical History:   Diagnosis Date    Asthma     Eczema     History of anemia     History of gout     History of hemorrhoids     Hypertension     Kidney stone     history    Leiomyoma     Murmur, heart     PONV (postoperative nausea and vomiting)     Rheumatoid arthritis 2022       Past Surgical History:   Procedure Laterality Date    ABDOMINOPLASTY       SECTION      x2    COLONOSCOPY      ENDOSCOPY      ENDOSCOPY N/A 3/5/2024    Procedure: ESOPHAGOGASTRODUODENOSCOPY WITH BIOPSY;  Surgeon: Randal Calix Jr., MD;  Location: [facility] ENDOSCOPY;  Service: General;  Laterality: N/A;  PRE- DYSPEPSIA, H/LO SLEEVE  POST-GASTRITIS    GASTRIC SLEEVE LAPAROSCOPIC N/A 2022    Procedure: GASTRIC SLEEVE LAPAROSCOPIC OR ROBOTIC, HIATAL HERNIA REPAIR;  Surgeon: Randal Calix Jr., MD;  Location: [facility] OR OSC;  Service: Robotics - DaVinci;  Laterality: N/A;    HEMORRHOIDECTOMY      HERNIA REPAIR      HYSTERECTOMY SUPRACERVICAL Bilateral 01/15/2018    Procedure: LAPAROSCOPIC SUPRACERVICAL HYSTERECTOMY WITH DAVINCI ROBOT BILATERAL SALPINGECTOMY;  Surgeon: Brigitte Roldan MD;  Location: [facility] MAIN OR;  Service:     LIPOMA EXCISION      back    WISDOM TOOTH EXTRACTION      THREE       Allergies   Allergen Reactions    Iodine Anaphylaxis, Itching and Nausea And Vomiting    Shellfish-Derived Products Anaphylaxis, Itching and Nausea And Vomiting         Current Outpatient Medications:     albuterol sulfate  (90 Base) MCG/ACT inhaler, INHALE 2 PUFFS EVERY 6 HOURS AS NEEDED, Disp: 8.5 g, Rfl: 3    aspirin 81 MG EC tablet, Take 1 tablet by mouth Daily., Disp: , Rfl:     atorvastatin (LIPITOR) 40 MG tablet, TAKE 1 TABLET BY MOUTH EVERY NIGHT, Disp: 90 tablet, Rfl: 1     azelastine (ASTELIN) 0.1 % nasal spray, 2 sprays into the nostril(s) as directed by provider Daily As Needed., Disp: , Rfl:     Calcium 500-100 MG-UNIT chewable tablet, Chew 3 tablets Daily., Disp: , Rfl:     cyclobenzaprine (FLEXERIL) 10 MG tablet, Take 1 tablet by mouth 3 (Three) Times a Day As Needed for Muscle Spasms., Disp: 21 tablet, Rfl: 0    diclofenac (VOLTAREN) 75 MG EC tablet, Take 1 tablet by mouth 2 (Two) Times a Day As Needed (pain)., Disp: 20 tablet, Rfl: 0    ferrous sulfate 140 (45 Fe) MG tablet controlled-release tablet, Take  by mouth Daily With Breakfast., Disp: , Rfl:     fluticasone (FLONASE) 50 MCG/ACT nasal spray, 1 spray into the nostril(s) as directed by provider As Needed., Disp: , Rfl:     indapamide (LOZOL) 2.5 MG tablet, Take 1 tablet by mouth Daily As Needed (swelling)., Disp: 30 tablet, Rfl: 6    lidocaine (LIDODERM) 5 %, Place 1 patch on the skin as directed by provider Daily., Disp: , Rfl:     lisinopril (PRINIVIL,ZESTRIL) 10 MG tablet, Take 1 tablet by mouth Daily., Disp: 30 tablet, Rfl: 11    loratadine (CLARITIN) 10 MG tablet, Take 1 tablet by mouth Daily As Needed., Disp: , Rfl:     multivitamin with minerals tablet tablet, Take 1 tablet by mouth Daily., Disp: , Rfl:     vitamin B-12 (CYANOCOBALAMIN) 100 MCG tablet, Take 1 tablet by mouth Daily. HOLD PRIOR TO SURGERY, Disp: , Rfl:     vitamin C (ASCORBIC ACID) 250 MG tablet, Take 1 tablet by mouth 2 (Two) Times a Week., Disp: , Rfl:     Social History     Socioeconomic History    Marital status: Single   Tobacco Use    Smoking status: Never     Passive exposure: Never    Smokeless tobacco: Never   Vaping Use    Vaping status: Never Used   Substance and Sexual Activity    Alcohol use: Yes     Comment: MONTHLY 1 DRINKS    Drug use: Never    Sexual activity: Defer     Birth control/protection: Condom, Surgical       Family History   Adopted: Yes   Problem Relation Age of Onset    Malig Hyperthermia Neg Hx          Review of  Systems:  Review of Systems   Constitutional:  Positive for fatigue.   HENT: Negative.     Respiratory: Negative.     Cardiovascular: Negative.    Gastrointestinal: Negative.    Endocrine: Negative.    Genitourinary: Negative.    Musculoskeletal:  Positive for back pain.   Skin: Negative.    Neurological: Negative.    Psychiatric/Behavioral: Negative.         Physical Exam:  Vital Signs:  Weight: 116 kg (255 lb)   Body mass index is 39.33 kg/m².  Temp: 98.2 °F (36.8 °C)   Heart Rate: 82   BP: 158/90     Physical Exam  Vitals and nursing note reviewed.   Constitutional:       Appearance: She is well-developed.   Neck:      Thyroid: No thyromegaly.   Cardiovascular:      Rate and Rhythm: Normal rate.   Pulmonary:      Effort: Pulmonary effort is normal. No respiratory distress.   Abdominal:      Palpations: Abdomen is soft.   Musculoskeletal:         General: No tenderness.   Skin:     General: Skin is warm and dry.   Neurological:      Mental Status: She is alert.   Psychiatric:         Behavior: Behavior normal.            Assessment:         Jessica Mcdowell is a 53 y.o. year old female with medically complicated severe obesity. Weight: 116 kg (255 lb), Body mass index is 39.33 kg/m². and weight related problems including hypertension, cardiovascular disease, GERD, asthma, edema, depression, and mental health disease.    I explained in detail, potential surgical options of interest to the patient including the RNY gastric bypass, sleeve gastrectomy, and gastric band while considering the patient's medical history. At this time, the patient expressed interest in the Laparoscopic Sleeve  All of those procedures can be performed laparoscopically but there is a chance to convert to open if any technical challenges or complications do occur.  Bariatric surgery is not cosmetic surgery but rather a tool to help a patient make a life-long commitment lifestyle changes including diet, exercise, behavior changes, and taking  supplemental vitamins and minerals.    Due to the patient's BMI, history, and co-morbidities related to potential surgical complications were evaluated. Due to Jessica Mcdowell's risk factors female will obtain the following prior to being scheduled for surgical intervention:    A letter of medical support as well as a history and physical must be obtained from her primary care provider. The patient was given a copy of a sample form, that will suffice as their letter to take to their primary are provider.    A referral for pre-operative psychological evaluation was ordered for the patient to evaluate candidacy as well as provide mental health support, should it be warranted before or after surgery.    Recent blood work was reviewed: B1 level was WNL dated 11/8/23, CBC did reveal mildly low Hmg level but patient is treating anemia with oral iron, CMP did show mild creatinine elevation, TSH was WNL, Hba1c was WNL dated 10/25/23  and EKG, CXR, and psychology clearance were ordered at this time. These will be drawn and patient will be notified with results. Patient will complete new, pre-operative radiology prior to being scheduled for surgery. Jessica Mcdowell will obtain clearance from a cardiologist prior to surgery.       Jessica Mcdowell was screened for sleep apnea in our office today and based on their results she is low risk for NILDA. The risks, as they relate to chronic hypercapnia r/t untreated NILDA were discussed with the patient and She verbalized understanding.     The risks, benefits, alternatives, and potential complications of all of the sleeve gastrectomy explained in detail including, but not limited to death, anesthesia and medication adverse effect/DVT, pulmonary embolism, trocar site/incisional hernia, wound infection, abdominal infection, bleeding, failure to lose weight or gain weight and change in body image, metabolic complications with calcium, thiamine, vitamin B12, folate, iron, and  anemia.    As this patient is a female who is post-menopausal or has undergone a surgical operation which precluded her from becoming pregnant she was not counseled related to conception and pregnancy.     The patient was advised to start a high protein, low fat and low carbohydrate diet  start routine exercise including but not limited to 150 minutes per week. The patient received a resistance band along with a handout of exercises. The patient was given individualized information by our dietician along with handouts.     The patient was given information regarding the GANESH educational video. GANESH is an internet based educational video which explains the sourgical procedure and answers basic questions regarding the procedure. The patient was provided with instructions and a password to watch the video.    The patient understands the surgical procedures and the different surgical options that are available.  She understands the lifestyle changes that would be required after surgery and has agreed to participate in a pre-operative and postoperative weight management program.  She also expressed understanding of possible risks, had several questions answered and desires to proceed.    I think she is a good candidate for this surgery, and is interested in a gastric bypass.    Encounter Diagnoses   Name Primary?    Dietary counseling Yes    Obesity, Class II, BMI 35-39.9     Primary hypertension     S/P laparoscopic sleeve gastrectomy     Vitamin D deficiency     Gastroesophageal reflux disease, unspecified whether esophagitis present     Chronic pain of both knees     Mild intermittent asthma without complication     Prediabetes        Plan:    Patient will be evaluated by a bariatric dietician psychologist before undergoing a multidisciplinary review of her candidacy. We discussed weight loss requirements prior to surgery and rationale, as well as other program requirements to ensure the safest approach to surgery. We  spent time discussing different surgical procedures and plan of care throughout their lifespan to ensure long term success in achieving and maintaining a healthier weight. Patient will proceed with preoperative lab work, radiology, and endoscopy after obtaining agreed upon specialty, clearances, sleep study, and letter of support from her primary care provider.    Total time spent during this encounter today was 65 minutes and includes preparing for the visit, reviewing tests, performing a medically appropriate examination and/or evaluations, counseling and educating the patient/family/caregiver, ordering medications, tests, or procedures, documenting information in the medical record, and independently interpreting results and communicating that information with the patient/family/caregiver  Monisha Zuluaga, AARTI  4/29/2024

## 2024-04-30 ENCOUNTER — OFFICE VISIT (OUTPATIENT)
Dept: FAMILY MEDICINE CLINIC | Facility: CLINIC | Age: 54
End: 2024-04-30
Payer: COMMERCIAL

## 2024-04-30 VITALS
WEIGHT: 253 LBS | HEIGHT: 68 IN | BODY MASS INDEX: 38.34 KG/M2 | SYSTOLIC BLOOD PRESSURE: 132 MMHG | HEART RATE: 85 BPM | DIASTOLIC BLOOD PRESSURE: 82 MMHG | OXYGEN SATURATION: 100 %

## 2024-04-30 DIAGNOSIS — D64.9 ANEMIA, UNSPECIFIED TYPE: ICD-10-CM

## 2024-04-30 DIAGNOSIS — K62.5 RECTAL BLEEDING: Primary | ICD-10-CM

## 2024-04-30 DIAGNOSIS — Z87.19 HISTORY OF HEMORRHOIDS: ICD-10-CM

## 2024-04-30 PROCEDURE — 99213 OFFICE O/P EST LOW 20 MIN: CPT | Performed by: NURSE PRACTITIONER

## 2024-04-30 NOTE — PROGRESS NOTES
"Chief Complaint  Rectal Bleeding    Subjective        HPI   Jessica presents to [facility] for recurrent recent Rectal Bleeding:    Rectal Bleeding - she reports having 2-3 bloody stools over the past week.  Originally when had blood in stool it was a ruptured internal hemorrhoid.  She showed Dr. Lemos a picture on her phone of her bloody stool in toilet on 4/4/24.  Dr. Lemos indicated that she had a Colonoscopy 2 years ago and it was normal.  He advised patient that she should follow-up with him if symptoms persist.  Strongly encouraged patient to call and schedule appointment with Dr. Lemos, General Surgery, regarding recurrent bloody stool and she agreed.            Objective   Vital Signs:   Vitals:    04/30/24 1528   BP: 132/82   BP Location: Right arm   Patient Position: Sitting   Cuff Size: Adult   Pulse: 85   SpO2: 100%   Weight: 115 kg (253 lb)   Height: 171.5 cm (67.52\")            4/30/2024     3:32 PM   PHQ-2/PHQ-9 Depression Screening   Little Interest or Pleasure in Doing Things 0-->not at all   Feeling Down, Depressed or Hopeless 0-->not at all   PHQ-9: Brief Depression Severity Measure Score 0       Physical Exam  Vitals and nursing note reviewed.   Constitutional:       General: She is not in acute distress.     Appearance: Normal appearance.   HENT:      Head: Normocephalic and atraumatic.   Cardiovascular:      Rate and Rhythm: Normal rate and regular rhythm.      Heart sounds: Normal heart sounds. No murmur heard.  Pulmonary:      Effort: Pulmonary effort is normal. No respiratory distress.      Breath sounds: Normal breath sounds. No wheezing.   Abdominal:      General: Bowel sounds are normal. There is no distension.      Palpations: Abdomen is soft. There is no mass.      Tenderness: There is no abdominal tenderness. There is no guarding or rebound.      Hernia: No hernia is present.   Genitourinary:     Comments: Deferred; denies having external " hemorrhoid.  Skin:     General: Skin is warm and dry.      Findings: No erythema.   Neurological:      Mental Status: She is alert.          Result Review :     The following data was reviewed by: AARTI Limon on 04/30/2024:      CBC & Differential (04/29/2024 11:37)      Assessment and Plan    Assessment & Plan  Rectal bleeding  Recurrent recent rectal bleeding with bright red blood in toilet water based on picture on phone.  Hbg 11.6 on lab from 4/29/24, which has improved from Hgb 11.1 on lab from 4/1/24.  Strongly encouraged patient to call and schedule appointment with Dr. Lemos, General Surgery, regarding recurrent rectal bleeding and she agreed.      History of hemorrhoids  Patient reports history of internal hemorrhoids.  Increase water intake, decrease caffeine.  Increase activity.  Take (OTC) Stool Softener daily PRN and (OTC) Fiber or eat foods high in Fiber to reduce risk of constipation.      Anemia, unspecified type  Hgb 11.6 on lab from 4/29/24, which has improved from Hgb 11.1 on lab from 4/1/24.  Increase foods high in iron (spinach, broccoli, beans, etc.).  Will continue to monitor.            Follow Up   Return if symptoms worsen or fail to improve.  Patient was given instructions and counseling regarding her condition or for health maintenance advice. Please see specific information pulled into the AVS if appropriate.

## 2024-05-02 LAB — VIT B1 BLD-SCNC: 79.4 NMOL/L (ref 66.5–200)

## 2024-05-03 ENCOUNTER — PATIENT ROUNDING (BHMG ONLY) (OUTPATIENT)
Dept: BARIATRICS/WEIGHT MGMT | Facility: CLINIC | Age: 54
End: 2024-05-03
Payer: COMMERCIAL

## 2024-05-03 NOTE — PROGRESS NOTES
Good afternoon,    My name is Liss Main, I am the Practice Manager for [facility].      I want to officially welcome you to our practice and ask about your recent visit.      If you could tell me about your recent visit with us. What things went well?      We're always looking for ways to make our patients experiences even better. Do you have recommendations on ways we may improve?      I appreciate you taking the time to answer a few questions today.      Thank you, and have a great day!        Message was sent to the patient via [employer] for PATIENT ROUNDING for [facility] [place].

## 2024-05-04 PROBLEM — K92.1 BLOODY STOOL: Status: ACTIVE | Noted: 2024-05-04

## 2024-05-05 NOTE — ASSESSMENT & PLAN NOTE
Patient reports history of internal hemorrhoids.  Increase water intake, decrease caffeine.  Increase activity.  Take (OTC) Stool Softener daily PRN and (OTC) Fiber or eat foods high in Fiber to reduce risk of constipation.

## 2024-05-06 LAB — METHYLMALONATE SERPL-SCNC: 147 NMOL/L (ref 0–378)

## 2024-05-14 DIAGNOSIS — J45.20 MILD INTERMITTENT ASTHMA WITHOUT COMPLICATION: Chronic | ICD-10-CM

## 2024-05-14 RX ORDER — ALBUTEROL SULFATE 90 UG/1
2 AEROSOL, METERED RESPIRATORY (INHALATION) EVERY 6 HOURS PRN
Qty: 8.5 G | Refills: 3 | Status: SHIPPED | OUTPATIENT
Start: 2024-05-14

## 2024-07-01 ENCOUNTER — TELEPHONE (OUTPATIENT)
Dept: CARDIOLOGY | Facility: CLINIC | Age: 54
End: 2024-07-01

## 2024-07-01 NOTE — TELEPHONE ENCOUNTER
Caller: Jessica Mcdowell    Relationship to patient: Self    Best call back number: [phone]    Chief complaint: NEEDING A CARDIAC CLEARANCE FOR BARIATRIC SURGERY COMING UP IN 4 WEEKS.    Type of visit: FU     Requested date: ASAP      Additional notes:PLEASE ADVISE WHEN TO SCHEDULE PT AND CALL BACK TO OFFER AN APPT.

## 2024-07-02 ENCOUNTER — OFFICE VISIT (OUTPATIENT)
Dept: FAMILY MEDICINE CLINIC | Facility: CLINIC | Age: 54
End: 2024-07-02
Payer: COMMERCIAL

## 2024-07-02 VITALS
HEART RATE: 64 BPM | OXYGEN SATURATION: 99 % | WEIGHT: 253.8 LBS | SYSTOLIC BLOOD PRESSURE: 144 MMHG | BODY MASS INDEX: 38.46 KG/M2 | HEIGHT: 68 IN | DIASTOLIC BLOOD PRESSURE: 98 MMHG

## 2024-07-02 DIAGNOSIS — E78.1 HYPERTRIGLYCERIDEMIA: Chronic | ICD-10-CM

## 2024-07-02 DIAGNOSIS — R60.0 BILATERAL LOWER EXTREMITY EDEMA: Chronic | ICD-10-CM

## 2024-07-02 DIAGNOSIS — I10 PRIMARY HYPERTENSION: Chronic | ICD-10-CM

## 2024-07-02 DIAGNOSIS — E66.9 OBESITY, CLASS II, BMI 35-39.9: Chronic | ICD-10-CM

## 2024-07-02 DIAGNOSIS — J30.2 SEASONAL ALLERGIES: Chronic | ICD-10-CM

## 2024-07-02 DIAGNOSIS — G89.29 CHRONIC PAIN OF BOTH KNEES: Chronic | ICD-10-CM

## 2024-07-02 DIAGNOSIS — Z01.818 PRE-OP EXAMINATION: Primary | ICD-10-CM

## 2024-07-02 DIAGNOSIS — R01.1 HEART MURMUR: Chronic | ICD-10-CM

## 2024-07-02 DIAGNOSIS — J45.20 MILD INTERMITTENT ASTHMA WITHOUT COMPLICATION: Chronic | ICD-10-CM

## 2024-07-02 DIAGNOSIS — D50.8 IRON DEFICIENCY ANEMIA SECONDARY TO INADEQUATE DIETARY IRON INTAKE: Chronic | ICD-10-CM

## 2024-07-02 DIAGNOSIS — I25.2 HISTORY OF HEART ATTACK: ICD-10-CM

## 2024-07-02 DIAGNOSIS — M10.9 GOUT, UNSPECIFIED CAUSE, UNSPECIFIED CHRONICITY, UNSPECIFIED SITE: ICD-10-CM

## 2024-07-02 DIAGNOSIS — M25.562 CHRONIC PAIN OF BOTH KNEES: Chronic | ICD-10-CM

## 2024-07-02 DIAGNOSIS — M25.561 CHRONIC PAIN OF BOTH KNEES: Chronic | ICD-10-CM

## 2024-07-02 PROBLEM — Z87.442 HISTORY OF KIDNEY STONES: Status: RESOLVED | Noted: 2022-02-16 | Resolved: 2024-07-02

## 2024-07-02 PROBLEM — K58.9 IBS (IRRITABLE BOWEL SYNDROME): Status: RESOLVED | Noted: 2022-02-16 | Resolved: 2024-07-02

## 2024-07-02 PROBLEM — K21.9 GASTROESOPHAGEAL REFLUX DISEASE: Status: RESOLVED | Noted: 2024-02-28 | Resolved: 2024-07-02

## 2024-07-02 PROBLEM — R60.9 FLUID RETENTION: Status: RESOLVED | Noted: 2022-09-14 | Resolved: 2024-07-02

## 2024-07-02 PROBLEM — K92.1 BLOODY STOOL: Status: RESOLVED | Noted: 2024-05-04 | Resolved: 2024-07-02

## 2024-07-02 PROBLEM — K59.00 CONSTIPATION: Status: RESOLVED | Noted: 2022-09-09 | Resolved: 2024-07-02

## 2024-07-02 PROBLEM — Z87.19 HISTORY OF HEMORRHOIDS: Status: RESOLVED | Noted: 2024-03-23 | Resolved: 2024-07-02

## 2024-07-02 PROBLEM — K64.9 HEMORRHOIDS: Status: RESOLVED | Noted: 2022-02-16 | Resolved: 2024-07-02

## 2024-07-02 LAB
EXPIRATION DATE: NORMAL
HBA1C MFR BLD: 5.6 % (ref 4.5–5.7)
Lab: NORMAL

## 2024-07-02 PROCEDURE — 99214 OFFICE O/P EST MOD 30 MIN: CPT | Performed by: NURSE PRACTITIONER

## 2024-07-02 PROCEDURE — 83036 HEMOGLOBIN GLYCOSYLATED A1C: CPT | Performed by: NURSE PRACTITIONER

## 2024-07-02 RX ORDER — OXYCODONE HYDROCHLORIDE AND ACETAMINOPHEN 5; 325 MG/1; MG/1
1 TABLET ORAL ONCE AS NEEDED
COMMUNITY
Start: 2024-05-30 | End: 2024-07-02

## 2024-07-02 RX ORDER — NEOMYCIN SULFATE, POLYMYXIN B SULFATE AND DEXAMETHASONE 3.5; 10000; 1 MG/ML; [USP'U]/ML; MG/ML
2 SUSPENSION/ DROPS OPHTHALMIC
COMMUNITY
Start: 2024-03-06 | End: 2024-07-02

## 2024-07-02 RX ORDER — LIDOCAINE HYDROCHLORIDE 30 MG/G
1 CREAM TOPICAL EVERY 4 HOURS PRN
COMMUNITY
Start: 2024-05-30

## 2024-07-02 NOTE — ASSESSMENT & PLAN NOTE
Patient's (Body mass index is 39.14 kg/m².) indicates that they are obese (BMI >30) with health conditions that include hypertension, dyslipidemias, and osteoarthritis . Weight is unchanged. BMI  is above average; BMI management plan is completed. We discussed low calorie, low carb based diet program, portion control, increasing exercise, joining a fitness center or start home based exercise program, Information on healthy weight added to patient's after visit summary, and having surgery for Gastric Bypass.

## 2024-07-02 NOTE — ASSESSMENT & PLAN NOTE
Lipid abnormalities are uncontrolled on Atorvastatin 40mg nightly.  Encouraged lifestyle changes.  Continue current treatment plan.  Will continue to monitor.

## 2024-07-02 NOTE — PROGRESS NOTES
Chief Complaint  surgery clearance  (Surgery clearance )    Subjective        HPI   History of Present Illness      Jessica presents to [facility] for Pre-Op Optimization for Gastric Bypass Surgery with Dr. Calix, surgery date unknown:    Allergies:   Iodine - itching  Shellfish - throat swelling, vomit, itch.    Past Surgeries:  - hemorrhoid banding   - Gastric Sleeve   - Abdominoplasty   -  - 2000,   - Hernia Repair - date unknown  - Hysterectomy - 2018  - Lipoma removal - unknown  - Anna Teeth Extraction - unknown    Anesthesia Problems - post op vomiting.  Malignant Hyperthermia - denies personal/family history.  Smoke - denies ever smoking  [employer] - denies ever using drugs.  ETOH - denies ETOH use.    Medical Diagnosis:    Hypertension - uncontrolled on Lisinopril 10mg daily and took medication this morning.  She denies missing doses of medication.  She denies CP, heart palpitations, SOB and syncope.  She is followed by Cardiology yearly.    Heart Murmur - stable; asymptomatic.  She reports heart murmur evaluated by Cardiology and was told there was nothing to worry about.    History of Heart Attack - she went to Urgent Care in  and was told had low iron level and recommended she go to the ED.  She went to ED and and was told she was having a heart attack and did work up for MI.    EKG dated 24 showed:  - ABNORMAL ECG -  Sinus rhythm  Atrial premature complexes  Probable left atrial enlargement  Borderline low voltage, extremity leads  Anteroseptal infarct, old  No change from previous tracing  Electronically Signed By: Tiago Rodriguez) ([facility]) 2024 19:35:26  Date and Time of Study: 2024 12:12:22    ECHO from 3/24/2022 showed Calculated left ventricular EF = 64.6% Estimated left ventricular EF was in agreement with the calculated left ventricular EF.  Left ventricular diastolic function was normal.  There is no evidence  of pericardial effusion.  Exercise - Stress Test Only results from 3/18/2022 show:  Arrhythmias were not significant during stress.  ECG evidence of myocardial ischemia  Negative clinical evidence of myocardial ischemia. Findings consistent with an abnormal ECG stress test. Asymptomatic for chest pain.     Asymptomatic for chest pain.  ECG is positive for ischemia:  1.5 mm Horizontal to downslope ST segment Depression   BP response:appropriate  Ectopy:none  Exercise Tolerance:poor  Supervised by:  Charline BROUSSARD.    Recommendation: nuclear stress test    Exercise Stress Test w/Myocardial Perfusion Study showed:  Moderate risk for ischemic heart disease.  Findings consistent with an equivocal ECG stress test.  Left ventricular ejection fraction is normal. (Calculated EF = 60%).  Myocardial perfusion imaging indicates a normal myocardial perfusion study with no evidence of ischemia.  Impressions are consistent with a low risk study.     Asymptomatic for chest pain. Specificity of study reduced secondary to baseline Non-specific ST-T wave abnormalities Inferior Leads only,  ECG is  suggestive of ischemia with  2-2.5 mm ST-T downslope depression Inferior leads and 1-2 mm ST- T downslope depression Lateral leads.  ECG is equivocal for suggestion of ischemia  Ectopy: rare PVC at baseline, occasional PVC's and multifocal couplets in exercise and none in recovery  Blood pressure response:  appropriate. Baseline 110/74. Peak 158/70. Recovery 168//68  Exercise Tolerance: poor, Reached 18 Of  20 Lopez Scale.  Solis treadmill score -6.7  Estimates  5-year survival of 95  % . Using the Duke score there is a  Medium risk  of angiographic coronary disease.     Supervised by: Dr. Saleh     Asthma - she has mild asthma and is controlled on Albuterol HFA PRN.  She reports needing her Albuterol about 3 times per year.    Chest X-ray from 4/29/24 showed:  The lungs are well expanded and clear and the heart and  "hilar  structures are normal. There is no acute disease or change from  4/8/2022.    Hypertriglyceridemia - uncontrolled on Atorvastatin 40mg daily.  She is working on cutting back on sugar and carbs.  She walks for exercise 2 days per week.    BLE edema - controlled on Indapamide PRN.  She usually takes it about 2 times per week.  She denies swelling complaints today.    Anemia - uncontrolled.  She is taking Ferrous Sulfate daily.  She reports missing doses sometimes.    Seasonal Allergies - controlled on Loratadine daily.  She denies any complaints today.    Chronic Bilateral Knee Pain - controlled on Lidocaine Cream and Tylenol Arthritis medication.    Gout - controlled with diet.  Denies taking medication for gout.    Denies snoring, apneic episodes and diagnosis of NILDA.      Review of Systems   Constitutional:  Negative for chills and fever.   Respiratory:  Negative for cough, chest tightness, shortness of breath and wheezing.    Cardiovascular:  Negative for chest pain, palpitations and leg swelling.   Gastrointestinal:  Negative for abdominal pain, blood in stool, constipation, diarrhea, nausea and vomiting.   Endocrine: Negative for cold intolerance and heat intolerance.   Genitourinary:  Negative for decreased urine volume, dyspareunia and hematuria.   Musculoskeletal:  Positive for arthralgias.        Chronic bilateral knee pain   Skin:  Negative for rash and wound.   Neurological:  Negative for seizures, syncope, weakness and numbness.   Hematological:  Does not bruise/bleed easily.   Psychiatric/Behavioral:  Negative for suicidal ideas.         Objective   Vital Signs:   Vitals:    07/02/24 0830 07/02/24 0929   BP: 148/98 144/98  Comment: re-checked   BP Location: Right arm Right arm   Patient Position: Sitting Sitting   Cuff Size: Adult Adult   Pulse: 64    SpO2: 99%    Weight: 115 kg (253 lb 12.8 oz)    Height: 171.5 cm (67.52\")             4/30/2024     3:32 PM   PHQ-2/PHQ-9 Depression Screening "   Little Interest or Pleasure in Doing Things 0-->not at all   Feeling Down, Depressed or Hopeless 0-->not at all   PHQ-9: Brief Depression Severity Measure Score 0        Physical Exam  Vitals and nursing note reviewed.   Constitutional:       General: She is not in acute distress.     Appearance: Normal appearance. She is obese. She is not ill-appearing.   HENT:      Head: Normocephalic and atraumatic.   Eyes:      Conjunctiva/sclera: Conjunctivae normal.   Neck:      Vascular: No carotid bruit.   Cardiovascular:      Rate and Rhythm: Normal rate and regular rhythm.      Heart sounds: Murmur heard.      Systolic murmur is present with a grade of 2/6.      Comments: Heart Murmur RUSB & LUSB  BLE trace non-pitting edema  Pulmonary:      Effort: Pulmonary effort is normal. No respiratory distress.      Breath sounds: Normal breath sounds. No wheezing or rales.   Abdominal:      General: Bowel sounds are normal. There is no distension.      Palpations: Abdomen is soft.      Tenderness: There is no abdominal tenderness. There is no guarding or rebound.   Musculoskeletal:      Right lower leg: Edema present.      Left lower leg: Edema present.   Skin:     General: Skin is warm and dry.      Findings: No erythema.   Neurological:      General: No focal deficit present.      Mental Status: She is alert.      Cranial Nerves: No cranial nerve deficit.      Motor: No weakness.      Gait: Gait normal.   Psychiatric:         Mood and Affect: Mood normal.         Behavior: Behavior normal.          Result Review :     The following data was reviewed by: AARTI Limon on 07/02/2024:    POC Glycosylated Hemoglobin (Hb A1C) (07/02/2024 09:51)     CBC & Differential (04/29/2024 11:37)  Comprehensive Metabolic Panel (04/29/2024 11:37)  Lipid Panel (04/01/2024 09:53)   Urine Culture - Urine, Urine, Clean Catch (04/02/2024 09:20)      Assessment and Plan    Assessment & Plan  Pre-op examination    Primary  hypertension  Hypertension is uncontrolled on Lisinopril 10mg daily.  Decrease table salt and foods high in salt.  Weight loss.  Increase activity daily.  Increase Lisinopril from 10mg daily to (2 tabs) 20mg daily.   Blood pressure will be re-assessed in 3 months.    Hypertriglyceridemia  Lipid abnormalities are uncontrolled on Atorvastatin 40mg nightly.  Encouraged lifestyle changes.  Continue current treatment plan.  Will continue to monitor.    Mild intermittent asthma without complication  Controlled on Albuterol HFA PRN.  Continue current treatment plan.  Will continue monitor.    Iron deficiency anemia secondary to inadequate dietary iron intake  Continue Ferrous Sulfate 140mg daily.  Continue current treatment plan.  Will continue to monitor.    Bilateral lower extremity edema  BLE trace non-pitting edema.  Decrease table salt and foods high in salt.  Increase activity daily.  Elevate BLE when sitting for extended timeframe.  Will continue to monitor.    Gout, unspecified cause, unspecified chronicity, unspecified site  Controlled with diet.  Encourage lifestyle changes.  Will continue to monitor.    Seasonal allergies  Controlled on Loratadine 10mg daily.  Continue current treatment plan.  Will continue monitor.    Heart murmur  Stable; asymptomatic.  Followed by Cardiology.    History of heart attack  Followed by Cardiology.    Chronic pain of both knees  Controlled on Lidocaine Cream and Tylenol Arthritis.  Continue current treatment plan.  Will continue monitor.    Obesity, Class II, BMI 35-39.9  Patient's (Body mass index is 39.14 kg/m².) indicates that they are obese (BMI >30) with health conditions that include hypertension, dyslipidemias, and osteoarthritis . Weight is unchanged. BMI  is above average; BMI management plan is completed. We discussed low calorie, low carb based diet program, portion control, increasing exercise, joining a fitness center or start home based exercise program, Information  on healthy weight added to patient's after visit summary, and having surgery for Gastric Bypass .       Orders Placed This Encounter   Procedures    POC Glycosylated Hemoglobin (Hb A1C)     New Medications Ordered This Visit   Medications    lisinopril (PRINIVIL,ZESTRIL) 10 MG tablet     Sig: Take 2 tablets by mouth Daily.     Patient is seen for pre-operative evaluation and optimization at the request of Dr. Calix prior to elective Gastric Bypass surgery. Patient has a heart murmur, recent abnormal EKG with history of heart attack and followed by cardiology.  Recommend patient get cardiac clearance prior to surgery.  Patient is to have pre-anesthesia testing as ordered per surgeon and anesthesia protocol.         I spent 30 minutes caring for Jessica on this date of service. This time includes time spent by me in the following activities:preparing for the visit, reviewing tests, performing a medically appropriate examination and/or evaluation , counseling and educating the patient/family/caregiver, ordering medications, tests, or procedures, and documenting information in the medical record  Follow Up   Return in about 3 months (around 10/2/2024) for Annual physical - 3 months after Bariatric Surgery.  Patient was given instructions and counseling regarding her condition or for health maintenance advice. Please see specific information pulled into the AVS if appropriate.

## 2024-07-02 NOTE — PATIENT INSTRUCTIONS
Exercising to Stay Healthy  To become healthy and stay healthy, it is recommended that you do moderate-intensity and vigorous-intensity exercise. You can tell that you are exercising at a moderate intensity if your heart starts beating faster and you start breathing faster but can still hold a conversation. You can tell that you are exercising at a vigorous intensity if you are breathing much harder and faster and cannot hold a conversation while exercising.  How can exercise benefit me?  Exercising regularly is important. It has many health benefits, such as:  Improving overall fitness, flexibility, and endurance.  Increasing bone density.  Helping with weight control.  Decreasing body fat.  Increasing muscle strength and endurance.  Reducing stress and tension, anxiety, depression, or anger.  Improving overall health.  What guidelines should I follow while exercising?  Before you start a new exercise program, talk with your health care provider.  Do not exercise so much that you hurt yourself, feel dizzy, or get very short of breath.  Wear comfortable clothes and wear shoes with good support.  Drink plenty of water while you exercise to prevent dehydration or heat stroke.  Work out until your breathing and your heartbeat get faster (moderate intensity).  How often should I exercise?  Choose an activity that you enjoy, and set realistic goals. Your health care provider can help you make an activity plan that is individually designed and works best for you.  Exercise regularly as told by your health care provider. This may include:  Doing strength training two times a week, such as:  Lifting weights.  Using resistance bands.  Push-ups.  Sit-ups.  Yoga.  Doing a certain intensity of exercise for a given amount of time. Choose from these options:  A total of 150 minutes of moderate-intensity exercise every week.  A total of 75 minutes of vigorous-intensity exercise every week.  A mix of moderate-intensity and  vigorous-intensity exercise every week.  Children, pregnant women, people who have not exercised regularly, people who are overweight, and older adults may need to talk with a health care provider about what activities are safe to perform. If you have a medical condition, be sure to talk with your health care provider before you start a new exercise program.  What are some exercise ideas?  Moderate-intensity exercise ideas include:  Walking 1 mile (1.6 km) in about 15 minutes.  Biking.  Hiking.  Golfing.  Dancing.  Water aerobics.  Vigorous-intensity exercise ideas include:  Walking 4.5 miles (7.2 km) or more in about 1 hour.  Jogging or running 5 miles (8 km) in about 1 hour.  Biking 10 miles (16.1 km) or more in about 1 hour.  Lap swimming.  Roller-skating or in-line skating.  Cross-country skiing.  Vigorous competitive sports, such as football, basketball, and soccer.  Jumping rope.  Aerobic dancing.  What are some everyday activities that can help me get exercise?  Yard work, such as:  Pushing a .  Raking and bagging leaves.  Washing your car.  Pushing a stroller.  Shoveling snow.  Gardening.  Washing windows or floors.  How can I be more active in my day-to-day activities?  Use stairs instead of an elevator.  Take a walk during your lunch break.  If you drive, park your car farther away from your work or school.  If you take public transportation, get off one stop early and walk the rest of the way.  Stand up or walk around during all of your indoor phone calls.  Get up, stretch, and walk around every 30 minutes throughout the day.  Enjoy exercise with a friend. Support to continue exercising will help you keep a regular routine of activity.  Where to find more information  You can find more information about exercising to stay healthy from:  [place] Department of Health and Human Services: www.hhs.gov  Centers for Disease Control and Prevention (CDC): www.cdc.gov  Summary  Exercising regularly is  important. It will improve your overall fitness, flexibility, and endurance.  Regular exercise will also improve your overall health. It can help you control your weight, reduce stress, and improve your bone density.  Do not exercise so much that you hurt yourself, feel dizzy, or get very short of breath.  Before you start a new exercise program, talk with your health care provider.  This information is not intended to replace advice given to you by your health care provider. Make sure you discuss any questions you have with your health care provider.  Document Revised: 04/15/2022 Document Reviewed: 04/15/2022  [employer] Patient Education © 2023 [employer] Calorie Counting for Weight Loss  Calories are units of energy. Your body needs a certain number of calories from food to keep going throughout the day. When you eat or drink more calories than your body needs, your body stores the extra calories mostly as fat. When you eat or drink fewer calories than your body needs, your body burns fat to get the energy it needs.  Calorie counting means keeping track of how many calories you eat and drink each day. Calorie counting can be helpful if you need to lose weight. If you eat fewer calories than your body needs, you should lose weight. Ask your health care provider what a healthy weight is for you.  For calorie counting to work, you will need to eat the right number of calories each day to lose a healthy amount of weight per week. A dietitian can help you figure out how many calories you need in a day and will suggest ways to reach your calorie goal.  A healthy amount of weight to lose each week is usually 1-2 lb (0.5-0.9 kg). This usually means that your daily calorie intake should be reduced by 500-750 calories.  Eating 1,200-1,500 calories a day can help most women lose weight.  Eating 1,500-1,800 calories a day can help most men lose weight.  What do I need to know about calorie counting?  Work with your health  care provider or dietitian to determine how many calories you should get each day. To meet your daily calorie goal, you will need to:  Find out how many calories are in each food that you would like to eat. Try to do this before you eat.  Decide how much of the food you plan to eat.  Keep a food log. Do this by writing down what you ate and how many calories it had.  To successfully lose weight, it is important to balance calorie counting with a healthy lifestyle that includes regular activity.  Where do I find calorie information?    The number of calories in a food can be found on a Nutrition Facts label. If a food does not have a Nutrition Facts label, try to look up the calories online or ask your dietitian for help.  Remember that calories are listed per serving. If you choose to have more than one serving of a food, you will have to multiply the calories per serving by the number of servings you plan to eat. For example, the label on a package of bread might say that a serving size is 1 slice and that there are 90 calories in a serving. If you eat 1 slice, you will have eaten 90 calories. If you eat 2 slices, you will have eaten 180 calories.  How do I keep a food log?  After each time that you eat, record the following in your food log as soon as possible:  What you ate. Be sure to include toppings, sauces, and other extras on the food.  How much you ate. This can be measured in cups, ounces, or number of items.  How many calories were in each food and drink.  The total number of calories in the food you ate.  Keep your food log near you, such as in a pocket-sized notebook or on an radha or website on your mobile phone. Some programs will calculate calories for you and show you how many calories you have left to meet your daily goal.  What are some portion-control tips?  Know how many calories are in a serving. This will help you know how many servings you can have of a certain food.  Use a measuring cup to  measure serving sizes. You could also try weighing out portions on a kitchen scale. With time, you will be able to estimate serving sizes for some foods.  Take time to put servings of different foods on your favorite plates or in your favorite bowls and cups so you know what a serving looks like.  Try not to eat straight from a food's packaging, such as from a bag or box. Eating straight from the package makes it hard to see how much you are eating and can lead to overeating. Put the amount you would like to eat in a cup or on a plate to make sure you are eating the right portion.  Use smaller plates, glasses, and bowls for smaller portions and to prevent overeating.  Try not to multitask. For example, avoid watching TV or using your computer while eating. If it is time to eat, sit down at a table and enjoy your food. This will help you recognize when you are full. It will also help you be more mindful of what and how much you are eating.  What are tips for following this plan?  Reading food labels  Check the calorie count compared with the serving size. The serving size may be smaller than what you are used to eating.  Check the source of the calories. Try to choose foods that are high in protein, fiber, and vitamins, and low in saturated fat, trans fat, and sodium.  Shopping  Read nutrition labels while you shop. This will help you make healthy decisions about which foods to buy.  Pay attention to nutrition labels for low-fat or fat-free foods. These foods sometimes have the same number of calories or more calories than the full-fat versions. They also often have added sugar, starch, or salt to make up for flavor that was removed with the fat.  Make a grocery list of lower-calorie foods and stick to it.  Cooking  Try to cook your favorite foods in a healthier way. For example, try baking instead of frying.  Use low-fat dairy products.  Meal planning  Use more fruits and vegetables. One-half of your plate should be  fruits and vegetables.  Include lean proteins, such as chicken, turkey, and fish.  Lifestyle  Each week, aim to do one of the followin minutes of moderate exercise, such as walking.  75 minutes of vigorous exercise, such as running.  General information  Know how many calories are in the foods you eat most often. This will help you calculate calorie counts faster.  Find a way of tracking calories that works for you. Get creative. Try different apps or programs if writing down calories does not work for you.  What foods should I eat?    Eat nutritious foods. It is better to have a nutritious, high-calorie food, such as an avocado, than a food with few nutrients, such as a bag of potato chips.  Use your calories on foods and drinks that will fill you up and will not leave you hungry soon after eating.  Examples of foods that fill you up are nuts and nut butters, vegetables, lean proteins, and high-fiber foods such as whole grains. High-fiber foods are foods with more than 5 g of fiber per serving.  Pay attention to calories in drinks. Low-calorie drinks include water and unsweetened drinks.  The items listed above may not be a complete list of foods and beverages you can eat. Contact a dietitian for more information.  What foods should I limit?  Limit foods or drinks that are not good sources of vitamins, minerals, or protein or that are high in unhealthy fats. These include:  Candy.  Other sweets.  Sodas, specialty coffee drinks, alcohol, and juice.  The items listed above may not be a complete list of foods and beverages you should avoid. Contact a dietitian for more information.  How do I count calories when eating out?  Pay attention to portions. Often, portions are much larger when eating out. Try these tips to keep portions smaller:  Consider sharing a meal instead of getting your own.  If you get your own meal, eat only half of it. Before you start eating, ask for a container and put half of your meal  into it.  When available, consider ordering smaller portions from the menu instead of full portions.  Pay attention to your food and drink choices. Knowing the way food is cooked and what is included with the meal can help you eat fewer calories.  If calories are listed on the menu, choose the lower-calorie options.  Choose dishes that include vegetables, fruits, whole grains, low-fat dairy products, and lean proteins.  Choose items that are boiled, broiled, grilled, or steamed. Avoid items that are buttered, battered, fried, or served with cream sauce. Items labeled as crispy are usually fried, unless stated otherwise.  Choose water, low-fat milk, unsweetened iced tea, or other drinks without added sugar. If you want an alcoholic beverage, choose a lower-calorie option, such as a glass of wine or light beer.  Ask for dressings, sauces, and syrups on the side. These are usually high in calories, so you should limit the amount you eat.  If you want a salad, choose a garden salad and ask for grilled meats. Avoid extra toppings such as larose, cheese, or fried items. Ask for the dressing on the side, or ask for olive oil and vinegar or lemon to use as dressing.  Estimate how many servings of a food you are given. Knowing serving sizes will help you be aware of how much food you are eating at restaurants.  Where to find more information  Centers for Disease Control and Prevention: www.cdc.gov  [place]. Department of Agriculture: myplate.gov  Summary  Calorie counting means keeping track of how many calories you eat and drink each day. If you eat fewer calories than your body needs, you should lose weight.  A healthy amount of weight to lose per week is usually 1-2 lb (0.5-0.9 kg). This usually means reducing your daily calorie intake by 500-750 calories.  The number of calories in a food can be found on a Nutrition Facts label. If a food does not have a Nutrition Facts label, try to look up the calories online or ask your  dietitian for help.  Use smaller plates, glasses, and bowls for smaller portions and to prevent overeating.  Use your calories on foods and drinks that will fill you up and not leave you hungry shortly after a meal.  This information is not intended to replace advice given to you by your health care provider. Make sure you discuss any questions you have with your health care provider.  Document Revised: 01/28/2021 Document Reviewed: 01/28/2021  Elsevier Patient Education © 2023 [employer]

## 2024-07-02 NOTE — ASSESSMENT & PLAN NOTE
Hypertension is uncontrolled on Lisinopril 10mg daily.  Decrease table salt and foods high in salt.  Weight loss.  Increase activity daily.  Increase Lisinopril from 10mg daily to (2 tabs) 20mg daily.   Blood pressure will be re-assessed in 3 months.

## 2024-07-04 PROBLEM — R60.0 BILATERAL LOWER EXTREMITY EDEMA: Status: ACTIVE | Noted: 2024-07-04

## 2024-07-04 RX ORDER — LISINOPRIL 10 MG/1
20 TABLET ORAL DAILY
Start: 2024-07-04

## 2024-07-05 ENCOUNTER — TELEPHONE (OUTPATIENT)
Dept: FAMILY MEDICINE CLINIC | Facility: CLINIC | Age: 54
End: 2024-07-05
Payer: COMMERCIAL

## 2024-07-05 NOTE — ASSESSMENT & PLAN NOTE
Controlled on Lidocaine Cream and Tylenol Arthritis.  Continue current treatment plan.  Will continue monitor.

## 2024-07-05 NOTE — ASSESSMENT & PLAN NOTE
BLE trace non-pitting edema.  Decrease table salt and foods high in salt.  Increase activity daily.  Elevate BLE when sitting for extended timeframe.  Will continue to monitor.

## 2024-07-05 NOTE — ASSESSMENT & PLAN NOTE
Continue Ferrous Sulfate 140mg daily.  Continue current treatment plan.  Will continue to monitor.

## 2024-07-10 ENCOUNTER — OFFICE VISIT (OUTPATIENT)
Dept: CARDIOLOGY | Facility: CLINIC | Age: 54
End: 2024-07-10
Payer: COMMERCIAL

## 2024-07-10 VITALS
BODY MASS INDEX: 38.04 KG/M2 | WEIGHT: 251 LBS | HEART RATE: 80 BPM | DIASTOLIC BLOOD PRESSURE: 91 MMHG | SYSTOLIC BLOOD PRESSURE: 142 MMHG | HEIGHT: 68 IN

## 2024-07-10 DIAGNOSIS — I10 PRIMARY HYPERTENSION: Primary | ICD-10-CM

## 2024-07-10 DIAGNOSIS — Z01.810 PRE-OPERATIVE CARDIOVASCULAR EXAMINATION: ICD-10-CM

## 2024-07-10 DIAGNOSIS — E78.5 HYPERLIPIDEMIA, UNSPECIFIED HYPERLIPIDEMIA TYPE: ICD-10-CM

## 2024-07-10 DIAGNOSIS — R01.1 HEART MURMUR: ICD-10-CM

## 2024-07-10 RX ORDER — ASPIRIN 81 MG/1
81 TABLET ORAL DAILY
COMMUNITY

## 2024-07-10 NOTE — PROGRESS NOTES
Subjective:        Jessica Mcdowell is a 53 y.o. female who here for follow up    No chief complaint on file.      HPI    This is a 53-year-old female who is new with this provider with hypertension, seen in office today for annual follow-up and cardiac clearance for bariatric.    Echo 2022 EF 64%, normal LV function.  Stress test 4/4/2022 myocardial perfusion imaging indicates a normal study with no evidence of ischemia.    The following portions of the patient's history were reviewed and updated as appropriate: allergies, current medications, past family history, past medical history, past social history, past surgical history and problem list.    Past Medical History:   Diagnosis Date    Asthma     Bloody stool 05/04/2024    Eczema     Hemorrhoids 02/16/2022    History of anemia     History of gout     History of hemorrhoids     History of kidney stones 02/16/2022    Hypertension     IBS (irritable bowel syndrome) 02/16/2022    Kidney stone     history    Leiomyoma     Murmur, heart     PONV (postoperative nausea and vomiting)     Rheumatoid arthritis 02/16/2022         reports that she has never smoked. She has never been exposed to tobacco smoke. She has never used smokeless tobacco. She reports that she does not currently use alcohol. She reports that she does not use drugs.     Family History   Adopted: Yes   Problem Relation Age of Onset    Malig Hyperthermia Neg Hx        ROS     Review of Systems  Constitutional: No wt loss, fever, fatigue  Gastrointestinal: No nausea, abdominal pain  Behavioral/Psych: No insomnia or anxiety  Cardiovascular no chest pain or shortness of breath      Objective:           Vitals and nursing note reviewed.   Constitutional:       Appearance: Well-developed.   HENT:      Head: Normocephalic.      Right Ear: External ear normal.      Left Ear: External ear normal.   Neck:      Vascular: No JVD.   Pulmonary:      Effort: Pulmonary effort is normal. No respiratory distress.       Breath sounds: Normal breath sounds. No stridor. No rales.   Cardiovascular:      Normal rate. Regular rhythm.      No gallop.    Pulses:     Intact distal pulses.   Edema:     Peripheral edema absent.   Abdominal:      General: Bowel sounds are normal. There is no distension.      Palpations: Abdomen is soft.      Tenderness: There is no abdominal tenderness. There is no guarding.   Musculoskeletal: Normal range of motion.         General: No tenderness.      Cervical back: Normal range of motion. Skin:     General: Skin is warm.   Neurological:      Mental Status: Alert and oriented to person, place, and time.      Deep Tendon Reflexes: Reflexes are normal and symmetric.   Psychiatric:         Judgment: Judgment normal.           ECG 12 Lead    Date/Time: 7/10/2024 10:59 AM  Performed by: Charline Santamaria APRN    Authorized by: Charline Santamaria APRN  Comparison: compared with previous ECG from 4/29/2024  Similar to previous ECG  Rhythm: sinus rhythm  Rate: normal  BPM: 65    Clinical impression: non-specific ECG          Interpretation Summary       Moderate risk for ischemic heart disease.  Findings consistent with an equivocal ECG stress test.  Left ventricular ejection fraction is normal. (Calculated EF = 60%).  Myocardial perfusion imaging indicates a normal myocardial perfusion study with no evidence of ischemia.  Impressions are consistent with a low risk study.    Interpretation Summary    Calculated left ventricular EF = 64.6% Estimated left ventricular EF was in agreement with the calculated left ventricular EF.  Left ventricular diastolic function was normal.  There is no evidence of pericardial effusion. .      Current Outpatient Medications:     albuterol sulfate  (90 Base) MCG/ACT inhaler, INHALE 2 PUFFS BY MOUTH EVERY 6 HOURS AS NEEDED, Disp: 8.5 g, Rfl: 3    atorvastatin (LIPITOR) 40 MG tablet, TAKE 1 TABLET BY MOUTH EVERY NIGHT, Disp: 90 tablet, Rfl: 1    Calcium 500-100 MG-UNIT chewable  tablet, Chew 3 tablets Daily., Disp: , Rfl:     ferrous sulfate 140 (45 Fe) MG tablet controlled-release tablet, Take  by mouth Daily With Breakfast., Disp: , Rfl:     indapamide (LOZOL) 2.5 MG tablet, Take 1 tablet by mouth Daily As Needed (swelling)., Disp: 30 tablet, Rfl: 6    lidocaine 3 % cream cream, Apply 1 dose topically to the appropriate area as directed Every 4 (Four) Hours As Needed., Disp: , Rfl:     lisinopril (PRINIVIL,ZESTRIL) 10 MG tablet, Take 2 tablets by mouth Daily., Disp: , Rfl:     loratadine (CLARITIN) 10 MG tablet, Take 1 tablet by mouth Daily As Needed., Disp: , Rfl:     multivitamin with minerals tablet tablet, Take 1 tablet by mouth Daily., Disp: , Rfl:     vitamin B-12 (CYANOCOBALAMIN) 100 MCG tablet, Take 1 tablet by mouth Daily. HOLD PRIOR TO SURGERY, Disp: , Rfl:     vitamin C (ASCORBIC ACID) 250 MG tablet, Take 1 tablet by mouth 2 (Two) Times a Week., Disp: , Rfl:      Assessment:        Patient Active Problem List   Diagnosis    Iron deficiency anemia secondary to inadequate dietary iron intake    HTN (hypertension)    Seasonal allergies    Unintended weight gain    Chronic fatigue    Heart murmur    Asthma    Chronic pain of both knees    Rheumatoid arthritis    Gout    Prediabetes    History of heart attack    Localized edema    Left leg swelling    Obesity, Class II, BMI 35-39.9    S/P laparoscopic sleeve gastrectomy    Hypertriglyceridemia    Elevated serum creatinine    Elevated serum alkaline phosphatase level    Vitamin D deficiency    Bilateral lower extremity edema               Plan:   1.  Cardiac clearance for bariatric surgery: Treadmill test and echo    It was explained to the patient that stress testing carries 85% specificity/sensitivity, and does not rule out future cardiac event.  Risks of the procedure were explained to the patient including shortness of breath, induction of myocardial infarction, and dizziness.  Patient is agreeable to proceeding with stress  testing.     2.  Hypertension: BP today in office 142/91, elevated, she checks it at home 120s-130s/70s.  Continue lisinopril    Importance of controlling hypertension and blood pressure  checkup on the regular basis has been explained. Hypertension as a silent killer has been discussed. Risk reduction of the weight and regular exercises to control the hypertension has been explained.    3.  Hyperlipidemia: Lipid panel 4/1/2024 , HDL 43, LDL 70, trig 158, AST/ALT WNL.  Continue atorvastatin.    Risk of the hyperlipidemia, importance of the treatment has been explained. Pros and cons of the statins has been explained. Regular blood workup as well as side effects including the liver failure, myelopathy death has been explained.    4. Heart murmur: previous echo stable, repeating echo for clearance.                No diagnosis found.    There are no diagnoses linked to this encounter.    COUNSELING: Done    Jessica Y NuckolsCounseling was given to patient for the following topics: diagnostic results, risk factor reductions, impressions, risks and benefits of treatment options and importance of treatment compliance .       SMOKING COUNSELING: denies    Treadmill test and echo and follow-up    Sincerely,   AARTI Deleon  [facility]  07/10/24  09:44 EDT    EMR Dragon/Transcription disclaimer:   Much of this encounter note is an electronic transcription/translation of spoken language to printed text. The electronic translation of spoken language may permit erroneous, or at times, nonsensical words or phrases to be inadvertently transcribed; Although I have reviewed the note for such errors, some may still exist.

## 2024-07-11 ENCOUNTER — HOSPITAL ENCOUNTER (OUTPATIENT)
Dept: CARDIOLOGY | Facility: HOSPITAL | Age: 54
Discharge: HOME | End: 2024-07-11
Payer: COMMERCIAL

## 2024-07-11 VITALS
HEIGHT: 67 IN | BODY MASS INDEX: 39.45 KG/M2 | SYSTOLIC BLOOD PRESSURE: 141 MMHG | WEIGHT: 251.32 LBS | HEART RATE: 61 BPM | DIASTOLIC BLOOD PRESSURE: 91 MMHG

## 2024-07-11 DIAGNOSIS — E78.5 HYPERLIPIDEMIA, UNSPECIFIED HYPERLIPIDEMIA TYPE: ICD-10-CM

## 2024-07-11 DIAGNOSIS — R01.1 HEART MURMUR: ICD-10-CM

## 2024-07-11 DIAGNOSIS — I10 PRIMARY HYPERTENSION: ICD-10-CM

## 2024-07-11 DIAGNOSIS — Z01.810 PRE-OPERATIVE CARDIOVASCULAR EXAMINATION: ICD-10-CM

## 2024-07-11 LAB
AORTIC DIMENSIONLESS INDEX: 0.9 (DI)
ASCENDING AORTA: 2.8 CM
BH CV ECHO MEAS - ACS: 1.96 CM
BH CV ECHO MEAS - AO MAX PG: 5.9 MMHG
BH CV ECHO MEAS - AO MEAN PG: 3.1 MMHG
BH CV ECHO MEAS - AO ROOT DIAM: 2.9 CM
BH CV ECHO MEAS - AO V2 MAX: 122 CM/SEC
BH CV ECHO MEAS - AO V2 VTI: 25.5 CM
BH CV ECHO MEAS - AVA(I,D): 2.7 CM2
BH CV ECHO MEAS - EDV(CUBED): 130.1 ML
BH CV ECHO MEAS - EDV(MOD-SP2): 72 ML
BH CV ECHO MEAS - EDV(MOD-SP4): 97 ML
BH CV ECHO MEAS - EF(MOD-BP): 68.4 %
BH CV ECHO MEAS - EF(MOD-SP2): 66.7 %
BH CV ECHO MEAS - EF(MOD-SP4): 69.1 %
BH CV ECHO MEAS - ESV(CUBED): 32.6 ML
BH CV ECHO MEAS - ESV(MOD-SP2): 24 ML
BH CV ECHO MEAS - ESV(MOD-SP4): 30 ML
BH CV ECHO MEAS - FS: 37 %
BH CV ECHO MEAS - IVS/LVPW: 0.98 CM
BH CV ECHO MEAS - IVSD: 1.03 CM
BH CV ECHO MEAS - LAT PEAK E' VEL: 9.5 CM/SEC
BH CV ECHO MEAS - LV MASS(C)D: 195.1 GRAMS
BH CV ECHO MEAS - LV MAX PG: 4.4 MMHG
BH CV ECHO MEAS - LV MEAN PG: 2.08 MMHG
BH CV ECHO MEAS - LV V1 MAX: 104.8 CM/SEC
BH CV ECHO MEAS - LV V1 VTI: 22.6 CM
BH CV ECHO MEAS - LVIDD: 5.1 CM
BH CV ECHO MEAS - LVIDS: 3.2 CM
BH CV ECHO MEAS - LVOT AREA: 3.1 CM2
BH CV ECHO MEAS - LVOT DIAM: 1.98 CM
BH CV ECHO MEAS - LVPWD: 1.05 CM
BH CV ECHO MEAS - MED PEAK E' VEL: 9.8 CM/SEC
BH CV ECHO MEAS - MR MAX PG: 50.1 MMHG
BH CV ECHO MEAS - MR MAX VEL: 354 CM/SEC
BH CV ECHO MEAS - MV A DUR: 0.16 SEC
BH CV ECHO MEAS - MV A MAX VEL: 65.5 CM/SEC
BH CV ECHO MEAS - MV DEC SLOPE: 669.6 CM/SEC2
BH CV ECHO MEAS - MV DEC TIME: 0.14 SEC
BH CV ECHO MEAS - MV E MAX VEL: 95.6 CM/SEC
BH CV ECHO MEAS - MV E/A: 1.46
BH CV ECHO MEAS - MV MAX PG: 7.2 MMHG
BH CV ECHO MEAS - MV MEAN PG: 1.71 MMHG
BH CV ECHO MEAS - MV P1/2T: 61.2 MSEC
BH CV ECHO MEAS - MV V2 VTI: 34.8 CM
BH CV ECHO MEAS - MVA(P1/2T): 3.6 CM2
BH CV ECHO MEAS - MVA(VTI): 2 CM2
BH CV ECHO MEAS - PA ACC TIME: 0.17 SEC
BH CV ECHO MEAS - PA V2 MAX: 93.7 CM/SEC
BH CV ECHO MEAS - PI END-D VEL: 124.9 CM/SEC
BH CV ECHO MEAS - PULM A REVS DUR: 0.13 SEC
BH CV ECHO MEAS - PULM A REVS VEL: 30.2 CM/SEC
BH CV ECHO MEAS - PULM DIAS VEL: 29.2 CM/SEC
BH CV ECHO MEAS - PULM S/D: 0.73
BH CV ECHO MEAS - PULM SYS VEL: 21.3 CM/SEC
BH CV ECHO MEAS - QP/QS: 0.88
BH CV ECHO MEAS - RAP SYSTOLE: 3 MMHG
BH CV ECHO MEAS - RV MAX PG: 1.38 MMHG
BH CV ECHO MEAS - RV V1 MAX: 58.7 CM/SEC
BH CV ECHO MEAS - RV V1 VTI: 14.2 CM
BH CV ECHO MEAS - RVOT DIAM: 2.34 CM
BH CV ECHO MEAS - RVSP: 19 MMHG
BH CV ECHO MEAS - SV(LVOT): 69.7 ML
BH CV ECHO MEAS - SV(MOD-SP2): 48 ML
BH CV ECHO MEAS - SV(MOD-SP4): 67 ML
BH CV ECHO MEAS - SV(RVOT): 61.1 ML
BH CV ECHO MEAS - TAPSE (>1.6): 2.39 CM
BH CV ECHO MEAS - TR MAX PG: 15.5 MMHG
BH CV ECHO MEAS - TR MAX VEL: 196.8 CM/SEC
BH CV ECHO MEASUREMENTS AVERAGE E/E' RATIO: 9.91
BH CV XLRA - RV BASE: 2.8 CM
BH CV XLRA - RV LENGTH: 8.2 CM
BH CV XLRA - RV MID: 3.3 CM
BH CV XLRA - TDI S': 11.9 CM/SEC
LEFT ATRIUM VOLUME INDEX: 32.6 ML/M2
SINUS: 2.9 CM
STJ: 2.7 CM

## 2024-07-11 PROCEDURE — 93306 TTE W/DOPPLER COMPLETE: CPT

## 2024-07-26 ENCOUNTER — HOSPITAL ENCOUNTER (OUTPATIENT)
Dept: CARDIOLOGY | Facility: HOSPITAL | Age: 54
Discharge: HOME | End: 2024-07-26
Payer: COMMERCIAL

## 2024-07-26 DIAGNOSIS — Z01.810 PRE-OPERATIVE CARDIOVASCULAR EXAMINATION: ICD-10-CM

## 2024-07-26 DIAGNOSIS — E78.5 HYPERLIPIDEMIA, UNSPECIFIED HYPERLIPIDEMIA TYPE: ICD-10-CM

## 2024-07-26 DIAGNOSIS — I10 PRIMARY HYPERTENSION: ICD-10-CM

## 2024-07-26 DIAGNOSIS — R01.1 HEART MURMUR: ICD-10-CM

## 2024-07-26 LAB
BH CV STRESS BP STAGE 1: NORMAL
BH CV STRESS BP STAGE 2: NORMAL
BH CV STRESS DURATION MIN STAGE 1: 3
BH CV STRESS DURATION MIN STAGE 2: 2
BH CV STRESS DURATION SEC STAGE 1: 0
BH CV STRESS DURATION SEC STAGE 2: 59
BH CV STRESS GRADE STAGE 1: 10
BH CV STRESS GRADE STAGE 2: 12
BH CV STRESS HR STAGE 1: 140
BH CV STRESS HR STAGE 2: 158
BH CV STRESS METS STAGE 1: 5
BH CV STRESS METS STAGE 2: 7.5
BH CV STRESS PROTOCOL 1: NORMAL
BH CV STRESS RECOVERY BP: NORMAL MMHG
BH CV STRESS RECOVERY HR: 92 BPM
BH CV STRESS SPEED STAGE 1: 1.7
BH CV STRESS SPEED STAGE 2: 2.5
BH CV STRESS STAGE 1: 1
BH CV STRESS STAGE 2: 2
MAXIMAL PREDICTED HEART RATE: 169 BPM
PERCENT MAX PREDICTED HR: 93.49 %
STRESS BASELINE BP: NORMAL MMHG
STRESS BASELINE HR: 83 BPM
STRESS PERCENT HR: 110 %
STRESS POST ESTIMATED WORKLOAD: 7.1 METS
STRESS POST EXERCISE DUR MIN: 6 MIN
STRESS POST EXERCISE DUR SEC: 0 SEC
STRESS POST PEAK BP: NORMAL MMHG
STRESS POST PEAK HR: 158 BPM
STRESS TARGET HR: 144 BPM

## 2024-07-26 PROCEDURE — 93017 CV STRESS TEST TRACING ONLY: CPT

## 2024-07-30 ENCOUNTER — OFFICE VISIT (OUTPATIENT)
Dept: CARDIOLOGY | Facility: CLINIC | Age: 54
End: 2024-07-30
Payer: COMMERCIAL

## 2024-07-30 VITALS
HEIGHT: 68 IN | WEIGHT: 253 LBS | SYSTOLIC BLOOD PRESSURE: 138 MMHG | BODY MASS INDEX: 38.34 KG/M2 | HEART RATE: 73 BPM | DIASTOLIC BLOOD PRESSURE: 91 MMHG

## 2024-07-30 DIAGNOSIS — I10 PRIMARY HYPERTENSION: Primary | ICD-10-CM

## 2024-07-30 DIAGNOSIS — R94.39 ABNORMAL STRESS TEST: ICD-10-CM

## 2024-07-30 DIAGNOSIS — E78.5 HYPERLIPIDEMIA, UNSPECIFIED HYPERLIPIDEMIA TYPE: ICD-10-CM

## 2024-07-30 DIAGNOSIS — R01.1 HEART MURMUR: ICD-10-CM

## 2024-07-30 DIAGNOSIS — Z01.810 PRE-OPERATIVE CARDIOVASCULAR EXAMINATION: ICD-10-CM

## 2024-07-30 PROCEDURE — 99214 OFFICE O/P EST MOD 30 MIN: CPT

## 2024-07-30 NOTE — PROGRESS NOTES
Subjective:        Jessica Mcdowell is a 53 y.o. female who here for follow up    Chief Complaint   Patient presents with    Results     TEST RESULTS - CLEARANCE FOR BARIATRIC SURGERY, DR. SAMIR VALLES      This is a 53-year-old female who is known with this provider with hypertension, seen in office today for cardiac clearance.Echo 7/11/2024 EF 66 to 70%, normal LV function. Treadmill stress test 7/26/2024 was an abnormal ECG stress test.    The following portions of the patient's history were reviewed and updated as appropriate: allergies, current medications, past family history, past medical history, past social history, past surgical history and problem list.    Past Medical History:   Diagnosis Date    Asthma     Bloody stool 05/04/2024    Eczema     Hemorrhoids 02/16/2022    History of anemia     History of gout     History of hemorrhoids     History of kidney stones 02/16/2022    Hypertension     IBS (irritable bowel syndrome) 02/16/2022    Kidney stone     history    Leiomyoma     Murmur, heart     PONV (postoperative nausea and vomiting)     Rheumatoid arthritis 02/16/2022         reports that she has never smoked. She has never been exposed to tobacco smoke. She has never used smokeless tobacco. She reports that she does not currently use alcohol. She reports that she does not use drugs.     Family History   Adopted: Yes   Problem Relation Age of Onset    Malig Hyperthermia Neg Hx        ROS     Review of Systems  Constitutional: NoNo wt loss, fever, fatigue  Gastrointestinal: No nausea, abdominal pain  Behavioral/Psych: No insomnia or anxiety  Cardiovascular No chest pain or shortness of breath      Objective:           Vitals and nursing note reviewed.   Constitutional:       Appearance: Well-developed.   HENT:      Head: Normocephalic.      Right Ear: External ear normal.      Left Ear: External ear normal.   Neck:      Vascular: No JVD.   Pulmonary:      Effort: Pulmonary effort is normal. No  respiratory distress.      Breath sounds: Normal breath sounds. No stridor. No rales.   Cardiovascular:      Normal rate. Regular rhythm.      No gallop.    Pulses:     Intact distal pulses.   Edema:     Peripheral edema absent.   Abdominal:      General: Bowel sounds are normal. There is no distension.      Palpations: Abdomen is soft.      Tenderness: There is no abdominal tenderness. There is no guarding.   Musculoskeletal: Normal range of motion.         General: No tenderness.      Cervical back: Normal range of motion. Skin:     General: Skin is warm.   Neurological:      Mental Status: Alert and oriented to person, place, and time.      Deep Tendon Reflexes: Reflexes are normal and symmetric.   Psychiatric:         Judgment: Judgment normal.         Procedures    Interpretation Summary         Left ventricular ejection fraction appears to be 66 - 70%.    Left ventricular diastolic function was normal.    The right atrial cavity is borderline dilated.    Estimated right ventricular systolic pressure from tricuspid regurgitation is normal (<35 mmHg).         Current Outpatient Medications:     albuterol sulfate  (90 Base) MCG/ACT inhaler, INHALE 2 PUFFS BY MOUTH EVERY 6 HOURS AS NEEDED, Disp: 8.5 g, Rfl: 3    aspirin 81 MG EC tablet, Take 1 tablet by mouth Daily., Disp: , Rfl:     atorvastatin (LIPITOR) 40 MG tablet, TAKE 1 TABLET BY MOUTH EVERY NIGHT, Disp: 90 tablet, Rfl: 1    Calcium 500-100 MG-UNIT chewable tablet, Chew 3 tablets Daily., Disp: , Rfl:     ferrous sulfate 140 (45 Fe) MG tablet controlled-release tablet, Take  by mouth Daily With Breakfast., Disp: , Rfl:     indapamide (LOZOL) 2.5 MG tablet, Take 1 tablet by mouth Daily As Needed (swelling)., Disp: 30 tablet, Rfl: 6    lidocaine 3 % cream cream, Apply 1 dose topically to the appropriate area as directed Every 4 (Four) Hours As Needed., Disp: , Rfl:     lisinopril (PRINIVIL,ZESTRIL) 10 MG tablet, Take 2 tablets by mouth Daily., Disp: ,  Rfl:     loratadine (CLARITIN) 10 MG tablet, Take 1 tablet by mouth Daily As Needed., Disp: , Rfl:     multivitamin with minerals tablet tablet, Take 1 tablet by mouth Daily., Disp: , Rfl:     vitamin B-12 (CYANOCOBALAMIN) 100 MCG tablet, Take 1 tablet by mouth Daily. HOLD PRIOR TO SURGERY, Disp: , Rfl:     vitamin C (ASCORBIC ACID) 250 MG tablet, Take 1 tablet by mouth 2 (Two) Times a Week., Disp: , Rfl:      Assessment:        Patient Active Problem List   Diagnosis    Iron deficiency anemia secondary to inadequate dietary iron intake    HTN (hypertension)    Seasonal allergies    Unintended weight gain    Chronic fatigue    Heart murmur    Asthma    Chronic pain of both knees    Rheumatoid arthritis    Gout    Prediabetes    History of heart attack    Localized edema    Left leg swelling    Obesity, Class II, BMI 35-39.9    S/P laparoscopic sleeve gastrectomy    Hypertriglyceridemia    Elevated serum creatinine    Elevated serum alkaline phosphatase level    Vitamin D deficiency    Bilateral lower extremity edema               Plan:   1.  Cardiac clearance for bariatric surgery: tmt abnormal, no chest pain. Will proceed with cardiolite. If normal cardiolite will clear for surgery    It was explained to the patient that stress testing carries 85% specificity/sensitivity, and does not rule out future cardiac event.  Risks of the procedure were explained to the patient including shortness of breath, induction of myocardial infarction, and dizziness.  Patient is agreeable to proceeding with stress testing.     2.  Hypertension: BP today in office, 138/91. continue lisinopril, low sodium diet.     Importance of controlling hypertension and blood pressure  checkup on the regular basis has been explained. Hypertension as a silent killer has been discussed. Risk reduction of the weight and regular exercises to control the hypertension has been explained.    3.  Hyperlipidemia: continue statin therapy    Risk of the  hyperlipidemia, importance of the treatment has been explained. Pros and cons of the statins has been explained. Regular blood workup as well as side effects including the liver failure, myelopathy death has been explained.    4. Heart murmur: stable echo               No diagnosis found.    There are no diagnoses linked to this encounter.    COUNSELING: done    Jessica Y NuckolsCounseling was given to patient for the following topics: diagnostic results, risk factor reductions, impressions, risks and benefits of treatment options and importance of treatment compliance .       SMOKING COUNSELING: denies    Cardiolite  If ok 1 year    Sincerely,   AARTI Deleon  [facility]  07/30/24  08:54 EDT    EMR Dragon/Transcription disclaimer:   Much of this encounter note is an electronic transcription/translation of spoken language to printed text. The electronic translation of spoken language may permit erroneous, or at times, nonsensical words or phrases to be inadvertently transcribed; Although I have reviewed the note for such errors, some may still exist.

## 2024-07-31 ENCOUNTER — TELEPHONE (OUTPATIENT)
Dept: CARDIOLOGY | Facility: CLINIC | Age: 54
End: 2024-07-31

## 2024-07-31 NOTE — TELEPHONE ENCOUNTER
Caller: Jessica Mcdowell     Relationship: PATIENT    Best call back number: [phone]    What is your medical concern? PT IS CALLING BECAUSE SHE SAID THAT IN THE NOTES FROM STRESS TEST ON 7/26/24 IT STATED SHE ASKED FOR THE TEST TO BE STOPPED. SHE SAID THAT THIS IS INCORRECT AND SHE NEVER ASKED FOR IT TO BE STOPPED

## 2024-08-01 ENCOUNTER — HOSPITAL ENCOUNTER (OUTPATIENT)
Dept: CARDIOLOGY | Facility: HOSPITAL | Age: 54
Discharge: HOME | End: 2024-08-01
Payer: COMMERCIAL

## 2024-08-01 VITALS
OXYGEN SATURATION: 99 % | HEART RATE: 74 BPM | SYSTOLIC BLOOD PRESSURE: 138 MMHG | BODY MASS INDEX: 38.34 KG/M2 | WEIGHT: 253 LBS | DIASTOLIC BLOOD PRESSURE: 74 MMHG | HEIGHT: 68 IN

## 2024-08-01 LAB
BH CV REST NUCLEAR ISOTOPE DOSE: 10.5 MCI
BH CV STRESS BP STAGE 1: NORMAL
BH CV STRESS BP STAGE 2: NORMAL
BH CV STRESS DURATION MIN STAGE 1: 3
BH CV STRESS DURATION MIN STAGE 2: 3
BH CV STRESS DURATION SEC STAGE 1: 6
BH CV STRESS DURATION SEC STAGE 2: 0
BH CV STRESS GRADE STAGE 1: 10
BH CV STRESS GRADE STAGE 2: 12
BH CV STRESS HR STAGE 1: 138
BH CV STRESS HR STAGE 2: 154
BH CV STRESS METS STAGE 1: 4.6
BH CV STRESS METS STAGE 2: 7.5
BH CV STRESS NUCLEAR ISOTOPE DOSE: 32.9 MCI
BH CV STRESS PROTOCOL 1: NORMAL
BH CV STRESS RECOVERY BP: NORMAL MMHG
BH CV STRESS RECOVERY HR: 77 BPM
BH CV STRESS SPEED STAGE 1: 1.7
BH CV STRESS SPEED STAGE 2: 2.5
BH CV STRESS STAGE 1: 1
BH CV STRESS STAGE 2: 2
LV EF NUC BP: 60 %
MAXIMAL PREDICTED HEART RATE: 169 BPM
PERCENT MAX PREDICTED HR: 91.12 %
STRESS BASELINE BP: NORMAL MMHG
STRESS BASELINE HR: 74 BPM
STRESS O2 SAT REST: 99 %
STRESS PERCENT HR: 107 %
STRESS POST ESTIMATED WORKLOAD: 7.1 METS
STRESS POST EXERCISE DUR MIN: 5 MIN
STRESS POST EXERCISE DUR SEC: 14 SEC
STRESS POST PEAK BP: NORMAL MMHG
STRESS POST PEAK HR: 154 BPM
STRESS TARGET HR: 144 BPM

## 2024-08-01 PROCEDURE — 0 TECHNETIUM SESTAMIBI: Performed by: INTERNAL MEDICINE

## 2024-08-01 PROCEDURE — 93017 CV STRESS TEST TRACING ONLY: CPT

## 2024-08-01 PROCEDURE — 78452 HT MUSCLE IMAGE SPECT MULT: CPT

## 2024-08-01 PROCEDURE — A9500 TC99M SESTAMIBI: HCPCS | Performed by: INTERNAL MEDICINE

## 2024-08-01 RX ADMIN — TECHNETIUM TC 99M SESTAMIBI 1 DOSE: 1 INJECTION INTRAVENOUS at 09:04

## 2024-08-01 RX ADMIN — TECHNETIUM TC 99M SESTAMIBI 1 DOSE: 1 INJECTION INTRAVENOUS at 07:25

## 2024-08-02 ENCOUNTER — HOSPITAL ENCOUNTER (OUTPATIENT)
Dept: CARDIOLOGY | Facility: HOSPITAL | Age: 54
Discharge: HOME | End: 2024-08-02
Admitting: NURSE PRACTITIONER
Payer: COMMERCIAL

## 2024-08-02 ENCOUNTER — OFFICE VISIT (OUTPATIENT)
Dept: CARDIOLOGY | Facility: CLINIC | Age: 54
End: 2024-08-02
Payer: COMMERCIAL

## 2024-08-02 VITALS
SYSTOLIC BLOOD PRESSURE: 138 MMHG | DIASTOLIC BLOOD PRESSURE: 84 MMHG | HEIGHT: 68 IN | WEIGHT: 253 LBS | HEART RATE: 87 BPM | BODY MASS INDEX: 38.34 KG/M2

## 2024-08-02 DIAGNOSIS — Z01.810 PRE-OPERATIVE CARDIOVASCULAR EXAMINATION: ICD-10-CM

## 2024-08-02 DIAGNOSIS — I10 PRIMARY HYPERTENSION: ICD-10-CM

## 2024-08-02 DIAGNOSIS — E78.5 HYPERLIPIDEMIA, UNSPECIFIED HYPERLIPIDEMIA TYPE: ICD-10-CM

## 2024-08-02 DIAGNOSIS — R01.1 HEART MURMUR: ICD-10-CM

## 2024-08-02 DIAGNOSIS — E66.9 OBESITY, CLASS II, BMI 35-39.9: ICD-10-CM

## 2024-08-02 DIAGNOSIS — R94.30 ABNORMAL RESULTS OF CARDIOVASCULAR FUNCTION STUDIES: ICD-10-CM

## 2024-08-02 DIAGNOSIS — R94.30 ABNORMAL RESULTS OF CARDIOVASCULAR FUNCTION STUDIES: Primary | ICD-10-CM

## 2024-08-02 LAB
ANION GAP SERPL CALCULATED.3IONS-SCNC: 12 MMOL/L (ref 5–15)
APTT PPP: 46.1 SECONDS (ref 22.7–35.4)
BASOPHILS # BLD AUTO: 0.02 10*3/MM3 (ref 0–0.2)
BASOPHILS NFR BLD AUTO: 0.5 % (ref 0–1.5)
BUN SERPL-MCNC: 10 MG/DL (ref 6–20)
BUN/CREAT SERPL: 11.8 (ref 7–25)
CALCIUM SPEC-SCNC: 9.5 MG/DL (ref 8.6–10.5)
CHLORIDE SERPL-SCNC: 103 MMOL/L (ref 98–107)
CO2 SERPL-SCNC: 27 MMOL/L (ref 22–29)
CREAT SERPL-MCNC: 0.85 MG/DL (ref 0.57–1)
DEPRECATED RDW RBC AUTO: 41.5 FL (ref 37–54)
EGFRCR SERPLBLD CKD-EPI 2021: 83.1 ML/MIN/1.73
EOSINOPHIL # BLD AUTO: 0.1 10*3/MM3 (ref 0–0.4)
EOSINOPHIL NFR BLD AUTO: 2.5 % (ref 0.3–6.2)
ERYTHROCYTE [DISTWIDTH] IN BLOOD BY AUTOMATED COUNT: 14.3 % (ref 12.3–15.4)
GLUCOSE SERPL-MCNC: 88 MG/DL (ref 65–99)
HCT VFR BLD AUTO: 35.5 % (ref 34–46.6)
HGB BLD-MCNC: 11.3 G/DL (ref 12–15.9)
IMM GRANULOCYTES # BLD AUTO: 0.01 10*3/MM3 (ref 0–0.05)
IMM GRANULOCYTES NFR BLD AUTO: 0.3 % (ref 0–0.5)
INR PPP: 1.02 (ref 0.9–1.1)
LYMPHOCYTES # BLD AUTO: 0.75 10*3/MM3 (ref 0.7–3.1)
LYMPHOCYTES NFR BLD AUTO: 19 % (ref 19.6–45.3)
MCH RBC QN AUTO: 25.5 PG (ref 26.6–33)
MCHC RBC AUTO-ENTMCNC: 31.8 G/DL (ref 31.5–35.7)
MCV RBC AUTO: 80 FL (ref 79–97)
MONOCYTES # BLD AUTO: 0.33 10*3/MM3 (ref 0.1–0.9)
MONOCYTES NFR BLD AUTO: 8.4 % (ref 5–12)
NEUTROPHILS NFR BLD AUTO: 2.74 10*3/MM3 (ref 1.7–7)
NEUTROPHILS NFR BLD AUTO: 69.3 % (ref 42.7–76)
NRBC BLD AUTO-RTO: 0 /100 WBC (ref 0–0.2)
PLATELET # BLD AUTO: 145 10*3/MM3 (ref 140–450)
PMV BLD AUTO: 11.9 FL (ref 6–12)
POTASSIUM SERPL-SCNC: 4.1 MMOL/L (ref 3.5–5.2)
PROTHROMBIN TIME: 13.6 SECONDS (ref 11.7–14.2)
RBC # BLD AUTO: 4.44 10*6/MM3 (ref 3.77–5.28)
SODIUM SERPL-SCNC: 142 MMOL/L (ref 136–145)
WBC NRBC COR # BLD AUTO: 3.95 10*3/MM3 (ref 3.4–10.8)

## 2024-08-02 PROCEDURE — 85610 PROTHROMBIN TIME: CPT | Performed by: NURSE PRACTITIONER

## 2024-08-02 PROCEDURE — 80048 BASIC METABOLIC PNL TOTAL CA: CPT | Performed by: NURSE PRACTITIONER

## 2024-08-02 PROCEDURE — 36415 COLL VENOUS BLD VENIPUNCTURE: CPT | Performed by: NURSE PRACTITIONER

## 2024-08-02 PROCEDURE — 85730 THROMBOPLASTIN TIME PARTIAL: CPT | Performed by: NURSE PRACTITIONER

## 2024-08-02 PROCEDURE — 85025 COMPLETE CBC W/AUTO DIFF WBC: CPT | Performed by: NURSE PRACTITIONER

## 2024-08-02 NOTE — PROGRESS NOTES
Subjective:        Jessica Mcdowell is a 53 y.o. female who here for follow up    No chief complaint on file.      HPI     Jessica Mcdowell is a 53-year-old female who has a history of hypertension, kidney stones, heart murmur, rheumatoid arthritis and history of anemia.    7/11/24 echo: EF 66 to 70%, normal LV function, RAC borderline dilated, R PA systolic pressure from TV regurgitation is normal.    8/1/24 stress test i myocardial perfusion imaging indicated small to moderate size, mild to moderately severe area of ischemia located in the anterior wall and lateral wall.        The following portions of the patient's history were reviewed and updated as appropriate: allergies, current medications, past family history, past medical history, past social history, past surgical history and problem list.    Past Medical History:   Diagnosis Date    Asthma     Bloody stool 05/04/2024    Eczema     Hemorrhoids 02/16/2022    History of anemia     History of gout     History of hemorrhoids     History of kidney stones 02/16/2022    Hypertension     IBS (irritable bowel syndrome) 02/16/2022    Kidney stone     history    Leiomyoma     Murmur, heart     PONV (postoperative nausea and vomiting)     Rheumatoid arthritis 02/16/2022         reports that she has never smoked. She has never been exposed to tobacco smoke. She has never used smokeless tobacco. She reports that she does not currently use alcohol. She reports that she does not use drugs.     Family History   Adopted: Yes   Problem Relation Age of Onset    Diabetes Father     Other (hypoglycemic) Father     Malig Hyperthermia Neg Hx        ROS     Review of Systems  Constitutional: No wt loss, fever, fatigue  Gastrointestinal: No nausea, abdominal pain  Behavioral/Psych: No insomnia or anxiety  Cardiovascular : no cp and shortness of breath      Objective:           Vitals and nursing note reviewed.   Constitutional:       Appearance: Well-developed.   HENT:       Head: Normocephalic.      Right Ear: External ear normal.      Left Ear: External ear normal.   Neck:      Vascular: No JVD.   Pulmonary:      Effort: Pulmonary effort is normal. No respiratory distress.      Breath sounds: Normal breath sounds. No stridor. No rales.   Cardiovascular:      Normal rate. Regular rhythm.      No gallop.    Pulses:     Intact distal pulses.   Edema:     Peripheral edema absent.   Abdominal:      General: Bowel sounds are normal. There is no distension.      Palpations: Abdomen is soft.      Tenderness: There is no abdominal tenderness. There is no guarding.   Musculoskeletal: Normal range of motion.         General: No tenderness.      Cervical back: Normal range of motion. Skin:     General: Skin is warm.   Neurological:      Mental Status: Alert and oriented to person, place, and time.      Deep Tendon Reflexes: Reflexes are normal and symmetric.   Psychiatric:         Judgment: Judgment normal.         Procedures    7/11/24  Interpretation Summary         Left ventricular ejection fraction appears to be 66 - 70%.    Left ventricular diastolic function was normal.    The right atrial cavity is borderline dilated.    Estimated right ventricular systolic pressure from tricuspid regurgitation is normal (<35 mmHg).     8/1/24    Interpretation Summary         Findings consistent with an abnormal ECG stress test.    Impressions are consistent with an intermediate risk study.    Left ventricular ejection fraction is normal (Calculated EF = 60%).    Moderate risk for ischemic heart disease.    Myocardial perfusion imaging indicates a small-to-moderate-sized, mild-to-moderately severe area of ischemia located in the anterior wall and lateral wall.     Asymptomatic for chest pain.  ECG is positive for ischemia:  2-3 mm Horizontal to downslope ST segment Depression Inferior Leads  1.5 mm  Upslope ST elevation  concave segment Depression Anterior Leads  1.5 mm Horizontal Downslope ST segment  Depression Lateral Leads  BP response:Baseline 138/74, Peak 174/80, Recovery: 172//70  Ectopy: rare PVC at baseline  Exercise Tolerance: fair, Reached 17 of 20 Lopez Scale      Current Outpatient Medications:     albuterol sulfate  (90 Base) MCG/ACT inhaler, INHALE 2 PUFFS BY MOUTH EVERY 6 HOURS AS NEEDED, Disp: 8.5 g, Rfl: 3    aspirin 81 MG EC tablet, Take 1 tablet by mouth Daily., Disp: , Rfl:     atorvastatin (LIPITOR) 40 MG tablet, TAKE 1 TABLET BY MOUTH EVERY NIGHT, Disp: 90 tablet, Rfl: 1    Calcium 500-100 MG-UNIT chewable tablet, Chew 3 tablets Daily., Disp: , Rfl:     ferrous sulfate 140 (45 Fe) MG tablet controlled-release tablet, Take  by mouth Daily With Breakfast., Disp: , Rfl:     indapamide (LOZOL) 2.5 MG tablet, Take 1 tablet by mouth Daily As Needed (swelling)., Disp: 30 tablet, Rfl: 6    lidocaine 3 % cream cream, Apply 1 dose topically to the appropriate area as directed Every 4 (Four) Hours As Needed., Disp: , Rfl:     lisinopril (PRINIVIL,ZESTRIL) 10 MG tablet, Take 2 tablets by mouth Daily., Disp: , Rfl:     loratadine (CLARITIN) 10 MG tablet, Take 1 tablet by mouth Daily As Needed., Disp: , Rfl:     multivitamin with minerals tablet tablet, Take 1 tablet by mouth Daily., Disp: , Rfl:     vitamin B-12 (CYANOCOBALAMIN) 100 MCG tablet, Take 1 tablet by mouth Daily. HOLD PRIOR TO SURGERY, Disp: , Rfl:     vitamin C (ASCORBIC ACID) 250 MG tablet, Take 1 tablet by mouth 2 (Two) Times a Week., Disp: , Rfl:   No current facility-administered medications for this visit.     Assessment:        Patient Active Problem List   Diagnosis    Iron deficiency anemia secondary to inadequate dietary iron intake    HTN (hypertension)    Seasonal allergies    Unintended weight gain    Chronic fatigue    Heart murmur    Asthma    Chronic pain of both knees    Rheumatoid arthritis    Gout    Prediabetes    History of heart attack    Localized edema    Left leg swelling    Obesity, Class II, BMI 35-39.9     S/P laparoscopic sleeve gastrectomy    Hypertriglyceridemia    Elevated serum creatinine    Elevated serum alkaline phosphatase level    Vitamin D deficiency    Bilateral lower extremity edema               Plan:   1.  Test results for cardiac clearance: Stress test was positive and she will undergo a cardiac cath.    Procedure, risks and options of cardiac cath explained to pt INCLUDING BUT NOT LIMITED TO MI, STROKE, DEATH, INFECTION HAEMORRHAGE, . Pt understands well and agrees with no further questions.    2.  Hypertension: Controlled on lisinopril.  Continue to monitor sodium intake.    Educated patient on exercising for at least 30 minutes a day for 2 to 3 days a week. Importance of controlling hypertension and blood pressure checkup on the regular basis has been explained. Hypertension as a silent killer has been discussed. Risk reduction of the weight and regular exercises to control the hypertension has been explained.    3.  Hyperlipidemia: Her PCP manages her cholesterol labs.  Continue statin.      Risk of the hyperlipidemia, importance of the treatment has been explained. Pros and cons of the statins has been explained. Regular blood workup as well as side effects including the liver failure, myelopathy death has been explained.    4.  Heart murmur: Stable             No diagnosis found.    There are no diagnoses linked to this encounter.    COUNSELING: done    Jessica Y NuckolsCounseling was given to patient for the following topics: diagnostic results, risk factor reductions, impressions, risks and benefits of treatment options and importance of treatment compliance .       SMOKING COUNSELING: denies    1. Positive stress test. She will undergo a cardiac cath.   2. shell fish allergy. Meds sent in..    Sincerely,   AARTI Shaw  [facility]  08/02/24  08:43 EDT    EMR Dragon/Transcription disclaimer: Dictated utilizing Dragon Dictation

## 2024-08-05 PROBLEM — R94.30 ABNORMAL RESULTS OF CARDIOVASCULAR FUNCTION STUDIES: Status: ACTIVE | Noted: 2024-08-02

## 2024-08-05 RX ORDER — DIPHENHYDRAMINE HCL 25 MG
TABLET ORAL
Qty: 3 TABLET | Refills: 0 | Status: SHIPPED | OUTPATIENT
Start: 2024-08-05

## 2024-08-05 RX ORDER — PREDNISONE 50 MG/1
TABLET ORAL
Qty: 3 TABLET | Refills: 0 | Status: SHIPPED | OUTPATIENT
Start: 2024-08-05

## 2024-08-06 ENCOUNTER — HOSPITAL ENCOUNTER (OUTPATIENT)
Facility: HOSPITAL | Age: 54
Setting detail: HOSPITAL OUTPATIENT SURGERY
Discharge: HOME | End: 2024-08-06
Attending: INTERNAL MEDICINE | Admitting: INTERNAL MEDICINE
Payer: COMMERCIAL

## 2024-08-06 VITALS
WEIGHT: 253 LBS | BODY MASS INDEX: 38.34 KG/M2 | TEMPERATURE: 97.6 F | HEIGHT: 68 IN | OXYGEN SATURATION: 96 % | DIASTOLIC BLOOD PRESSURE: 77 MMHG | SYSTOLIC BLOOD PRESSURE: 121 MMHG | RESPIRATION RATE: 16 BRPM | HEART RATE: 74 BPM

## 2024-08-06 DIAGNOSIS — R94.30 ABNORMAL RESULTS OF CARDIOVASCULAR FUNCTION STUDIES: ICD-10-CM

## 2024-08-06 PROCEDURE — 25510000001 IOPAMIDOL PER 1 ML: Performed by: INTERNAL MEDICINE

## 2024-08-06 PROCEDURE — C1769 GUIDE WIRE: HCPCS | Performed by: INTERNAL MEDICINE

## 2024-08-06 PROCEDURE — 93458 L HRT ARTERY/VENTRICLE ANGIO: CPT | Performed by: INTERNAL MEDICINE

## 2024-08-06 PROCEDURE — 25010000002 HEPARIN (PORCINE) PER 1000 UNITS: Performed by: INTERNAL MEDICINE

## 2024-08-06 PROCEDURE — 25010000002 FENTANYL CITRATE (PF) 50 MCG/ML SOLUTION: Performed by: INTERNAL MEDICINE

## 2024-08-06 PROCEDURE — 25810000003 SODIUM CHLORIDE 0.9 % SOLUTION: Performed by: INTERNAL MEDICINE

## 2024-08-06 PROCEDURE — 25010000002 MIDAZOLAM PER 1 MG: Performed by: INTERNAL MEDICINE

## 2024-08-06 PROCEDURE — C1894 INTRO/SHEATH, NON-LASER: HCPCS | Performed by: INTERNAL MEDICINE

## 2024-08-06 RX ORDER — FENTANYL CITRATE 50 UG/ML
INJECTION, SOLUTION INTRAMUSCULAR; INTRAVENOUS
Status: DISCONTINUED | OUTPATIENT
Start: 2024-08-06 | End: 2024-08-06 | Stop reason: HOSPADM

## 2024-08-06 RX ORDER — MIDAZOLAM HYDROCHLORIDE 1 MG/ML
INJECTION INTRAMUSCULAR; INTRAVENOUS
Status: DISCONTINUED | OUTPATIENT
Start: 2024-08-06 | End: 2024-08-06 | Stop reason: HOSPADM

## 2024-08-06 RX ORDER — HEPARIN SODIUM 1000 [USP'U]/ML
INJECTION, SOLUTION INTRAVENOUS; SUBCUTANEOUS
Status: DISCONTINUED | OUTPATIENT
Start: 2024-08-06 | End: 2024-08-06 | Stop reason: HOSPADM

## 2024-08-06 RX ORDER — VERAPAMIL HYDROCHLORIDE 2.5 MG/ML
INJECTION, SOLUTION INTRAVENOUS
Status: DISCONTINUED | OUTPATIENT
Start: 2024-08-06 | End: 2024-08-06 | Stop reason: HOSPADM

## 2024-08-06 RX ORDER — LIDOCAINE HYDROCHLORIDE 20 MG/ML
INJECTION, SOLUTION INFILTRATION; PERINEURAL
Status: DISCONTINUED | OUTPATIENT
Start: 2024-08-06 | End: 2024-08-06 | Stop reason: HOSPADM

## 2024-08-06 RX ORDER — SODIUM CHLORIDE 0.9 % (FLUSH) 0.9 %
10 SYRINGE (ML) INJECTION AS NEEDED
Status: DISCONTINUED | OUTPATIENT
Start: 2024-08-06 | End: 2024-08-06 | Stop reason: HOSPADM

## 2024-08-06 RX ORDER — SODIUM CHLORIDE 0.9 % (FLUSH) 0.9 %
10 SYRINGE (ML) INJECTION EVERY 12 HOURS SCHEDULED
Status: DISCONTINUED | OUTPATIENT
Start: 2024-08-06 | End: 2024-08-06 | Stop reason: HOSPADM

## 2024-08-06 RX ORDER — SODIUM CHLORIDE 9 MG/ML
75 INJECTION, SOLUTION INTRAVENOUS CONTINUOUS
Status: DISCONTINUED | OUTPATIENT
Start: 2024-08-06 | End: 2024-08-06 | Stop reason: HOSPADM

## 2024-08-06 RX ORDER — ACETAMINOPHEN 325 MG/1
650 TABLET ORAL EVERY 4 HOURS PRN
Status: DISCONTINUED | OUTPATIENT
Start: 2024-08-06 | End: 2024-08-06 | Stop reason: HOSPADM

## 2024-08-06 RX ORDER — NITROGLYCERIN 0.4 MG/1
0.4 TABLET SUBLINGUAL
Status: DISCONTINUED | OUTPATIENT
Start: 2024-08-06 | End: 2024-08-06 | Stop reason: HOSPADM

## 2024-08-06 RX ADMIN — SODIUM CHLORIDE 75 ML/HR: 9 INJECTION, SOLUTION INTRAVENOUS at 09:54

## 2024-08-06 NOTE — Clinical Note
Hemostasis started on the right radial artery. R-Band was used in achieving hemostasis. Radial compression device applied to vessel. Hemostasis achieved successfully. Closure device additional comment: TR band 15 cc of air

## 2024-08-06 NOTE — DISCHARGE INSTRUCTIONS
[facility]  [address] [place] [address]    Coronary Angiogram (Radial/Ulnar Approach) After Care    Refer to this sheet in the next few weeks. These instructions provide you with information on caring for yourself after your procedure. Your caregiver may also give you more specific instructions. Your treatment has been planned according to current medical practices, but problems sometimes occur. Call your caregiver if you have any problems or questions after your procedure.    Home Care Instructions:  You may shower the day after the procedure. Remove the bandage (dressing) and gently wash the site with plain soap and water. Gently pat the site dry. You may apply a band aid daily for 2 days if desired.    Do not apply powder or lotion to the site.  Do not submerge the affected site in water for 3 to 5 days or until the site is completely healed.   Do not lift, push or pull anything over 5 pounds for 5 days after your procedure or as directed by your physician.  As a reference, a gallon of milk weighs 8 pounds.   Inspect the site at least twice daily. You may notice some bruising at the site and it may be tender for 1 to 2 weeks.     Increase your fluid intake for the next 2 days.    Keep arm elevated for 24 hours. For the remainder of the day, keep your arm in “Pledge of Allegiance” position when up and about.     You may drive 24 hours after the procedure unless otherwise instructed by your caregiver.  Do not operate machinery or power tools for 24 hours.  A responsible adult should be with you for the first 24 hours after you arrive home. Do not make any important legal decisions or sign legal papers for 24 hours.  Do not drink alcohol for 24 hours.    Metformin or any medications containing Metformin should not be taken for 48 hours after your procedure.      Call Your Doctor if:   You have unusual pain at the radial/ulnar (wrist) site.  You have redness, warmth, swelling, or pain at the  radial/ulnar (wrist) site.  You have drainage (other than a small amount of blood on the dressing).  `You have chills or a fever > 101.  Your arm becomes pale or dark, cool, tingly, or numb.  You develop chest pain, shortness of breath, feel faint or pass out.    You have heavy bleeding from the site, hold pressure on the site for 20 minutes.  If the bleeding stops, apply a fresh bandage and call your cardiologist.  However, if you        continue to have bleeding, call 911 and continue to apply pressure to the site.   You have any symptoms of a stroke.  Remember BE FAST  B-balance. Sudden trouble walking or loss of balance.  E-eyes.  Sudden changes in how you see or a sudden onset of a very bad headache.   F-face. Sudden weakness or loss of feeling of the face or facial droop on one side.   A-arms Sudden weakness or numbness in one arm.  One arm drifts down if they are both held out in front of you. This happens suddenly and usually on one side of the body.   S-speech.  Sudden trouble speaking, slurred speech or trouble understanding what are saying.   T-time  Time to call emergency services.  Write down the symptoms and the time they started.

## 2024-08-20 ENCOUNTER — OFFICE VISIT (OUTPATIENT)
Dept: CARDIOLOGY | Facility: CLINIC | Age: 54
End: 2024-08-20
Payer: COMMERCIAL

## 2024-08-20 VITALS
HEIGHT: 68 IN | SYSTOLIC BLOOD PRESSURE: 128 MMHG | WEIGHT: 257 LBS | DIASTOLIC BLOOD PRESSURE: 79 MMHG | BODY MASS INDEX: 38.95 KG/M2 | HEART RATE: 83 BPM

## 2024-08-20 DIAGNOSIS — Z01.810 PRE-OPERATIVE CARDIOVASCULAR EXAMINATION: ICD-10-CM

## 2024-08-20 DIAGNOSIS — R01.1 HEART MURMUR: ICD-10-CM

## 2024-08-20 DIAGNOSIS — E78.5 HYPERLIPIDEMIA, UNSPECIFIED HYPERLIPIDEMIA TYPE: ICD-10-CM

## 2024-08-20 DIAGNOSIS — I10 PRIMARY HYPERTENSION: Primary | ICD-10-CM

## 2024-08-20 PROCEDURE — 99214 OFFICE O/P EST MOD 30 MIN: CPT

## 2024-08-20 NOTE — PROGRESS NOTES
Subjective:        Jessica Mcdowell is a 54 y.o. female who here for follow up    Chief Complaint   Patient presents with    Hospital Follow Up Visit     CATH       HPI    This is a 54 year old female who is known with this provider with hypertension, seen in office today in follow up from cardiac catheterization and bariatric surgery clearance. She underwent a nuclear stress test that was abnormal and it was decided to proceed with [facility].     Cardiac catheterization 8/6/2024 normal coronaries, normal LV gram.    Reviewed and still relevant: Echo 7/11/2024 EF 66 to 70%, normal LV function.     The following portions of the patient's history were reviewed and updated as appropriate: allergies, current medications, past family history, past medical history, past social history, past surgical history and problem list.    Past Medical History:   Diagnosis Date    Asthma     Bloody stool 05/04/2024    Eczema     Hemorrhoids 02/16/2022    History of anemia     History of gout     History of hemorrhoids     History of kidney stones 02/16/2022    Hyperlipidemia     Hypertension     IBS (irritable bowel syndrome) 02/16/2022    Kidney stone     history    Leiomyoma     Murmur, heart     PONV (postoperative nausea and vomiting)     Rheumatoid arthritis 02/16/2022         reports that she has never smoked. She has never been exposed to tobacco smoke. She has never used smokeless tobacco. She reports that she does not currently use alcohol. She reports that she does not use drugs.     Family History   Adopted: Yes   Problem Relation Age of Onset    Diabetes Father     Other (hypoglycemic) Father     Malig Hyperthermia Neg Hx        ROS     Review of Systems  Constitutional: no wt loss, fever, fatigue  Gastrointestinal: no nausea, abdominal pain  Behavioral/Psych: no insomnia or anxiety  Cardiovascular no chest pain or shortness of breath      Objective:           Vitals and nursing note reviewed.   Constitutional:        Appearance: Well-developed.   HENT:      Head: Normocephalic.      Right Ear: External ear normal.      Left Ear: External ear normal.   Neck:      Vascular: No JVD.   Pulmonary:      Effort: Pulmonary effort is normal. No respiratory distress.      Breath sounds: Normal breath sounds. No stridor. No rales.   Cardiovascular:      Normal rate. Regular rhythm.      No gallop.    Pulses:     Intact distal pulses.   Edema:     Peripheral edema absent.   Abdominal:      General: Bowel sounds are normal. There is no distension.      Palpations: Abdomen is soft.      Tenderness: There is no abdominal tenderness. There is no guarding.   Musculoskeletal: Normal range of motion.         General: No tenderness.      Cervical back: Normal range of motion. Skin:     General: Skin is warm.   Neurological:      Mental Status: Alert and oriented to person, place, and time.      Deep Tendon Reflexes: Reflexes are normal and symmetric.   Psychiatric:         Judgment: Judgment normal.         Procedures    Interpretation Summary         Left ventricular ejection fraction appears to be 66 - 70%.    Left ventricular diastolic function was normal.    The right atrial cavity is borderline dilated.    Estimated right ventricular systolic pressure from tricuspid regurgitation is normal (<35 mmHg).   Hemodynamics    Pressures    Ao: 110/70 mmHg   LV: 110/10 mmHg  End-diastolic pressure: 10 mmHg  No significant aortic valvular gradient on pullback    Coronary Angiography    Left Main: The left main originates in the left coronary cusp. It bifurcates and gives rise to the LAD and circumflex system. Normal left main    Left Anterior Descending: Left anterior descending is normal including the diagonal and  branches    Left Circumflex: Nondominant and normal    Right Coronary Artery: The right coronary artery originates in the right coronary cusp. Dominant and normal    Left Ventriculography:     Estimated Ejection Fraction: 60%    Wall motion abnormalities: None   Mitral Regurgitation: None     Impression:    Normal coronary artery  Normal LV gram    Recommendations:    Medical management        I sincerely appreciate the opportunity to participate in your patient's care. Please feel free to contact me anytime if I can be of assistance in this or any other way.    Cristian Saleh MD  8/6/2024  11:57 EDT      Current Outpatient Medications:     albuterol sulfate  (90 Base) MCG/ACT inhaler, INHALE 2 PUFFS BY MOUTH EVERY 6 HOURS AS NEEDED, Disp: 8.5 g, Rfl: 3    aspirin 81 MG EC tablet, Take 1 tablet by mouth Daily., Disp: , Rfl:     atorvastatin (LIPITOR) 40 MG tablet, TAKE 1 TABLET BY MOUTH EVERY NIGHT, Disp: 90 tablet, Rfl: 1    Calcium 500-100 MG-UNIT chewable tablet, Chew 3 tablets Daily., Disp: , Rfl:     diphenhydrAMINE (Benadryl Allergy) 25 MG tablet, 1 tab 6pm 1 tab 11pm the night before procedure 1 tab 6am morning of procedure, Disp: 3 tablet, Rfl: 0    ferrous sulfate 140 (45 Fe) MG tablet controlled-release tablet, Take  by mouth Daily With Breakfast., Disp: , Rfl:     indapamide (LOZOL) 2.5 MG tablet, Take 1 tablet by mouth Daily As Needed (swelling)., Disp: 30 tablet, Rfl: 6    lidocaine 3 % cream cream, Apply 1 dose topically to the appropriate area as directed Every 4 (Four) Hours As Needed., Disp: , Rfl:     lisinopril (PRINIVIL,ZESTRIL) 10 MG tablet, Take 2 tablets by mouth Daily., Disp: , Rfl:     loratadine (CLARITIN) 10 MG tablet, Take 1 tablet by mouth Daily As Needed., Disp: , Rfl:     multivitamin with minerals tablet tablet, Take 1 tablet by mouth Daily., Disp: , Rfl:     predniSONE (DELTASONE) 50 MG tablet, 1 tab 6pm 1 tab 11pm the night before procedure 1 tab 6am morning of procedure, Disp: 3 tablet, Rfl: 0    vitamin B-12 (CYANOCOBALAMIN) 100 MCG tablet, Take 1 tablet by mouth Daily. HOLD PRIOR TO SURGERY, Disp: , Rfl:     vitamin C (ASCORBIC ACID) 250 MG tablet, Take 1 tablet by mouth 2 (Two) Times a  Week., Disp: , Rfl:      Assessment:        Patient Active Problem List   Diagnosis    Iron deficiency anemia secondary to inadequate dietary iron intake    HTN (hypertension)    Seasonal allergies    Unintended weight gain    Chronic fatigue    Heart murmur    Asthma    Chronic pain of both knees    Rheumatoid arthritis    Gout    Prediabetes    History of heart attack    Localized edema    Left leg swelling    Obesity, Class II, BMI 35-39.9    S/P laparoscopic sleeve gastrectomy    Hypertriglyceridemia    Elevated serum creatinine    Elevated serum alkaline phosphatase level    Vitamin D deficiency    Bilateral lower extremity edema    Abnormal results of cardiovascular function studies               Plan:   1.  Cardiac clearance for bariatric surgery: clear for surgery    has been seen and examined with no clinical signs of angina or CHF , patient is cleared for bariatric surgery with non-modifiable risk factors. Patient has been advised to take cardiac meds with sip of water on the day of surgery.    Please use beta blocker for tachycardia preoperatively. Anticoagulation to be managed appropriately    Watch for chest pain, shortness of breath, palpitations, arrhythmias, and significant change in the blood pressure preoperatively. Please check EKG pre-op and postop if any questions, notify us if any change in patient's cardiovascular conditions.      2.  Hypertension: 128/79, controlled, continue lisinopril    Importance of controlling hypertension and blood pressure  checkup on the regular basis has been explained. Hypertension as a silent killer has been discussed. Risk reduction of the weight and regular exercises to control the hypertension has been explained.    3.  Hyperlipidemia: she reports her pcp manages her cholesterol and labs, continue atorvastatin.     Risk of the hyperlipidemia, importance of the treatment has been explained. Pros and cons of the statins has been explained. Regular blood workup as  well as side effects including the liver failure, myelopathy death has been explained.    4. Heart murmur: stable               No diagnosis found.    There are no diagnoses linked to this encounter.    COUNSELING: done    Jessica Y JulyComili was given to patient for the following topics: diagnostic results, risk factor reductions, impressions, risks and benefits of treatment options and importance of treatment compliance .       SMOKING COUNSELING: denies    Clear for surgery   Follow up in 1 year    Sincerely,   AARTI Deleon  [facility]  08/20/24  15:14 EDT    EMR Dragon/Transcription disclaimer:   Much of this encounter note is an electronic transcription/translation of spoken language to printed text. The electronic translation of spoken language may permit erroneous, or at times, nonsensical words or phrases to be inadvertently transcribed; Although I have reviewed the note for such errors, some may still exist.

## 2024-08-27 ENCOUNTER — TELEPHONE (OUTPATIENT)
Dept: BARIATRICS/WEIGHT MGMT | Facility: CLINIC | Age: 54
End: 2024-08-27
Payer: COMMERCIAL

## 2024-08-27 NOTE — TELEPHONE ENCOUNTER
Rec'd a PA request on patients wegovy that was written 01/30/2024. Consulted with provider about this. Response from provider        I did try to reach patient but had to leave a voicemail.

## 2024-09-09 ENCOUNTER — TELEPHONE (OUTPATIENT)
Dept: BARIATRICS/WEIGHT MGMT | Facility: CLINIC | Age: 54
End: 2024-09-09
Payer: COMMERCIAL

## 2024-09-10 ENCOUNTER — TELEPHONE (OUTPATIENT)
Dept: BARIATRICS/WEIGHT MGMT | Facility: CLINIC | Age: 54
End: 2024-09-10
Payer: COMMERCIAL

## 2024-09-13 ENCOUNTER — PATIENT OUTREACH (OUTPATIENT)
Dept: FAMILY MEDICINE CLINIC | Facility: CLINIC | Age: 54
End: 2024-09-13
Payer: COMMERCIAL

## 2024-09-16 ENCOUNTER — PREP FOR SURGERY (OUTPATIENT)
Dept: OTHER | Facility: HOSPITAL | Age: 54
End: 2024-09-16
Payer: COMMERCIAL

## 2024-09-16 DIAGNOSIS — E66.9 OBESITY, CLASS II, BMI 35-39.9: Primary | ICD-10-CM

## 2024-09-16 RX ORDER — SODIUM CHLORIDE 0.9 % (FLUSH) 0.9 %
10 SYRINGE (ML) INJECTION EVERY 12 HOURS SCHEDULED
OUTPATIENT
Start: 2024-09-16

## 2024-09-16 RX ORDER — SODIUM CHLORIDE 9 MG/ML
40 INJECTION, SOLUTION INTRAVENOUS AS NEEDED
OUTPATIENT
Start: 2024-09-16

## 2024-09-16 RX ORDER — SODIUM CHLORIDE 0.9 % (FLUSH) 0.9 %
1-10 SYRINGE (ML) INJECTION AS NEEDED
OUTPATIENT
Start: 2024-09-16

## 2024-09-16 RX ORDER — SODIUM CHLORIDE, SODIUM LACTATE, POTASSIUM CHLORIDE, CALCIUM CHLORIDE 600; 310; 30; 20 MG/100ML; MG/100ML; MG/100ML; MG/100ML
100 INJECTION, SOLUTION INTRAVENOUS CONTINUOUS
OUTPATIENT
Start: 2024-09-16

## 2024-09-16 RX ORDER — CHLORHEXIDINE GLUCONATE ORAL RINSE 1.2 MG/ML
15 SOLUTION DENTAL SEE ADMIN INSTRUCTIONS
OUTPATIENT
Start: 2024-09-16

## 2024-09-16 RX ORDER — GABAPENTIN 300 MG/1
300 CAPSULE ORAL ONCE
OUTPATIENT
Start: 2024-09-16 | End: 2024-09-16

## 2024-09-16 RX ORDER — SCOLOPAMINE TRANSDERMAL SYSTEM 1 MG/1
1 PATCH, EXTENDED RELEASE TRANSDERMAL CONTINUOUS
OUTPATIENT
Start: 2024-09-16 | End: 2024-09-19

## 2024-09-16 RX ORDER — PANTOPRAZOLE SODIUM 40 MG/10ML
40 INJECTION, POWDER, LYOPHILIZED, FOR SOLUTION INTRAVENOUS ONCE
OUTPATIENT
Start: 2024-09-16 | End: 2024-09-16

## 2024-09-16 RX ORDER — PROMETHAZINE HYDROCHLORIDE 25 MG/1
25 SUPPOSITORY RECTAL ONCE
OUTPATIENT
Start: 2024-09-16

## 2024-09-16 RX ORDER — METOCLOPRAMIDE HYDROCHLORIDE 5 MG/ML
10 INJECTION INTRAMUSCULAR; INTRAVENOUS ONCE
OUTPATIENT
Start: 2024-09-16 | End: 2024-09-16

## 2024-09-16 RX ORDER — PROMETHAZINE HYDROCHLORIDE 12.5 MG/1
12.5 TABLET ORAL ONCE
OUTPATIENT
Start: 2024-09-16 | End: 2024-09-16

## 2024-09-20 ENCOUNTER — PRE-ADMISSION TESTING (OUTPATIENT)
Dept: PREADMISSION TESTING | Facility: HOSPITAL | Age: 54
End: 2024-09-20
Payer: COMMERCIAL

## 2024-09-20 ENCOUNTER — CONSULT (OUTPATIENT)
Dept: BARIATRICS/WEIGHT MGMT | Facility: CLINIC | Age: 54
End: 2024-09-20
Payer: COMMERCIAL

## 2024-09-20 VITALS
OXYGEN SATURATION: 98 % | HEART RATE: 73 BPM | SYSTOLIC BLOOD PRESSURE: 145 MMHG | TEMPERATURE: 97.2 F | RESPIRATION RATE: 16 BRPM | DIASTOLIC BLOOD PRESSURE: 90 MMHG

## 2024-09-20 VITALS
HEART RATE: 71 BPM | TEMPERATURE: 97.1 F | HEIGHT: 68 IN | WEIGHT: 253 LBS | DIASTOLIC BLOOD PRESSURE: 79 MMHG | SYSTOLIC BLOOD PRESSURE: 120 MMHG | BODY MASS INDEX: 38.34 KG/M2

## 2024-09-20 DIAGNOSIS — G89.29 CHRONIC PAIN OF BOTH KNEES: ICD-10-CM

## 2024-09-20 DIAGNOSIS — Z71.3 DIETARY COUNSELING: ICD-10-CM

## 2024-09-20 DIAGNOSIS — Z98.84 S/P LAPAROSCOPIC SLEEVE GASTRECTOMY: ICD-10-CM

## 2024-09-20 DIAGNOSIS — M25.561 CHRONIC PAIN OF BOTH KNEES: ICD-10-CM

## 2024-09-20 DIAGNOSIS — E66.9 OBESITY, CLASS II, BMI 35-39.9: ICD-10-CM

## 2024-09-20 DIAGNOSIS — R53.82 CHRONIC FATIGUE: ICD-10-CM

## 2024-09-20 DIAGNOSIS — M25.562 CHRONIC PAIN OF BOTH KNEES: ICD-10-CM

## 2024-09-20 DIAGNOSIS — I10 HYPERTENSION, UNSPECIFIED TYPE: ICD-10-CM

## 2024-09-20 DIAGNOSIS — E78.1 HYPERTRIGLYCERIDEMIA: Chronic | ICD-10-CM

## 2024-09-20 DIAGNOSIS — J45.20 MILD INTERMITTENT ASTHMA WITHOUT COMPLICATION: ICD-10-CM

## 2024-09-20 DIAGNOSIS — E66.9 OBESITY, CLASS II, BMI 35-39.9: Primary | ICD-10-CM

## 2024-09-20 DIAGNOSIS — D50.8 IRON DEFICIENCY ANEMIA SECONDARY TO INADEQUATE DIETARY IRON INTAKE: ICD-10-CM

## 2024-09-20 LAB
ALBUMIN SERPL-MCNC: 4 G/DL (ref 3.5–5.2)
ALBUMIN/GLOB SERPL: 1.1 G/DL
ALP SERPL-CCNC: 115 U/L (ref 39–117)
ALT SERPL W P-5'-P-CCNC: 12 U/L (ref 1–33)
ANION GAP SERPL CALCULATED.3IONS-SCNC: 9.1 MMOL/L (ref 5–15)
AST SERPL-CCNC: 23 U/L (ref 1–32)
BILIRUB SERPL-MCNC: 0.5 MG/DL (ref 0–1.2)
BUN SERPL-MCNC: 11 MG/DL (ref 6–20)
BUN/CREAT SERPL: 12.1 (ref 7–25)
CALCIUM SPEC-SCNC: 9.5 MG/DL (ref 8.6–10.5)
CHLORIDE SERPL-SCNC: 106 MMOL/L (ref 98–107)
CO2 SERPL-SCNC: 24.9 MMOL/L (ref 22–29)
CREAT SERPL-MCNC: 0.91 MG/DL (ref 0.57–1)
DEPRECATED RDW RBC AUTO: 40.9 FL (ref 37–54)
EGFRCR SERPLBLD CKD-EPI 2021: 76.1 ML/MIN/1.73
ERYTHROCYTE [DISTWIDTH] IN BLOOD BY AUTOMATED COUNT: 14.5 % (ref 12.3–15.4)
GLOBULIN UR ELPH-MCNC: 3.5 GM/DL
GLUCOSE SERPL-MCNC: 91 MG/DL (ref 65–99)
HCT VFR BLD AUTO: 33.6 % (ref 34–46.6)
HGB BLD-MCNC: 10.8 G/DL (ref 12–15.9)
MCH RBC QN AUTO: 25.1 PG (ref 26.6–33)
MCHC RBC AUTO-ENTMCNC: 32.1 G/DL (ref 31.5–35.7)
MCV RBC AUTO: 78.1 FL (ref 79–97)
PLATELET # BLD AUTO: 136 10*3/MM3 (ref 140–450)
PMV BLD AUTO: 11.8 FL (ref 6–12)
POTASSIUM SERPL-SCNC: 4.3 MMOL/L (ref 3.5–5.2)
PROT SERPL-MCNC: 7.5 G/DL (ref 6–8.5)
RBC # BLD AUTO: 4.3 10*6/MM3 (ref 3.77–5.28)
SODIUM SERPL-SCNC: 140 MMOL/L (ref 136–145)
WBC NRBC COR # BLD AUTO: 3.51 10*3/MM3 (ref 3.4–10.8)

## 2024-09-20 PROCEDURE — 80053 COMPREHEN METABOLIC PANEL: CPT

## 2024-09-20 PROCEDURE — 36415 COLL VENOUS BLD VENIPUNCTURE: CPT

## 2024-09-20 PROCEDURE — 85027 COMPLETE CBC AUTOMATED: CPT

## 2024-09-20 RX ORDER — URSODIOL 300 MG/1
300 CAPSULE ORAL 2 TIMES DAILY
Qty: 60 CAPSULE | Refills: 5 | Status: SHIPPED | OUTPATIENT
Start: 2024-09-20

## 2024-09-20 RX ORDER — ENOXAPARIN SODIUM 100 MG/ML
40 INJECTION SUBCUTANEOUS
Qty: 5.6 ML | Refills: 0 | Status: SHIPPED | OUTPATIENT
Start: 2024-09-20 | End: 2024-10-04

## 2024-09-20 NOTE — H&P (VIEW-ONLY)
Bariatric Consult:  Referred by Emilia Bull APRN    Jessica Mcdowell is here today for consult on consult for gastric restrictive procedure with gastric bypass as well as small intestine reconstruction to limit absorption.    History of Present Illness:     Jessica Mcdowell is a 54 y.o. female with morbid obesity with co-morbidities including hypertension who presents for surgical consultation for the above procedure. Jessica has completed the initial intake visit and has been examined by our nurse practitioner, dietician, psychologist and underwent the extensive educational teaching process under the guidance of our bariatric coordinator and myself. Jessica also has seen or if has not yet will see the educational video GANESH on the surgical procedure if available. Jessica attended today more educational teaching from our bariatric coordinator and myself. Jessica has had an extensive medical workup including a visit with their primary care physician, EKG, chest radiograph, blood work, EGD or UGI and possibly further testing. These have been reviewed by me and discussed with the patient. Jessica is now ready to proceed with surgery. Jessica presently denies nausea, vomiting, fever, chills, chest pain, shortness of air, melena, hematochezia, hemetemesis, dysuria, frequency, hematuria.     Past Medical History:   Diagnosis Date    Abnormal EKG     CLEARED BY CARDIOLOGY    Asthma     Eczema     Hemorrhoids 02/16/2022    History of anemia     History of gout     History of hemorrhoids     History of kidney stones 02/16/2022    History of tachycardia     Hyperlipidemia     Hypertension     IBS (irritable bowel syndrome) 02/16/2022    Kidney stone     history    Leiomyoma     Murmur, heart     PONV (postoperative nausea and vomiting)     Rheumatoid arthritis 02/16/2022       Encounter Diagnoses   Name Primary?    Obesity, Class II, BMI 35-39.9 Yes    S/P laparoscopic sleeve gastrectomy     Hypertriglyceridemia      Hypertension, unspecified type     Chronic fatigue     Chronic pain of both knees     Iron deficiency anemia secondary to inadequate dietary iron intake     Mild intermittent asthma without complication     Dietary counseling        Past Surgical History:   Procedure Laterality Date    ABDOMINOPLASTY      CARDIAC CATHETERIZATION      CARDIAC CATHETERIZATION Left 2024    Procedure: Cardiac Catheterization/Vascular Study;  Surgeon: Cristian Saleh MD;  Location: [facility] CATH INVASIVE LOCATION;  Service: Cardiovascular;  Laterality: Left;    CARDIAC CATHETERIZATION N/A 2024    Procedure: Left Heart Cath;  Surgeon: Cristian Saleh MD;  Location: [facility] CATH INVASIVE LOCATION;  Service: Cardiovascular;  Laterality: N/A;    CARDIAC CATHETERIZATION N/A 2024    Procedure: Coronary angiography;  Surgeon: Cristian Saleh MD;  Location: [facility] CATH INVASIVE LOCATION;  Service: Cardiovascular;  Laterality: N/A;    CARDIAC CATHETERIZATION N/A 2024    Procedure: Left ventriculography;  Surgeon: Cristian Saleh MD;  Location: [facility] CATH INVASIVE LOCATION;  Service: Cardiovascular;  Laterality: N/A;     SECTION      x2    COLONOSCOPY      X 2    ENDOSCOPY N/A 2024    Procedure: ESOPHAGOGASTRODUODENOSCOPY WITH BIOPSY;  Surgeon: Randal Calix Jr., MD;  Location: [facility] ENDOSCOPY;  Service: General;  Laterality: N/A;  PRE- DYSPEPSIA, H/LO SLEEVE  POST-GASTRITIS    GASTRIC SLEEVE LAPAROSCOPIC N/A 2022    Procedure: GASTRIC SLEEVE LAPAROSCOPIC OR ROBOTIC, HIATAL HERNIA REPAIR;  Surgeon: Randal Calix Jr., MD;  Location: [facility] OR [facility];  Service: Robotics - DaVinci;  Laterality: N/A;    HEMORRHOIDECTOMY      HERNIA REPAIR      HYSTERECTOMY SUPRACERVICAL Bilateral 01/15/2018    Procedure: LAPAROSCOPIC SUPRACERVICAL HYSTERECTOMY WITH DAVINCI ROBOT BILATERAL SALPINGECTOMY;  Surgeon: Brigitte Roldan MD;  Location: [facility] MAIN OR;  Service:     LIPOMA EXCISION       back    WISDOM TOOTH EXTRACTION      THREE         * No active hospital problems. *      Allergies   Allergen Reactions    Iodine Anaphylaxis, Itching and Nausea And Vomiting    Shellfish-Derived Products Anaphylaxis, Itching and Nausea And Vomiting         Current Outpatient Medications:     albuterol sulfate  (90 Base) MCG/ACT inhaler, INHALE 2 PUFFS BY MOUTH EVERY 6 HOURS AS NEEDED, Disp: 8.5 g, Rfl: 3    atorvastatin (LIPITOR) 40 MG tablet, TAKE 1 TABLET BY MOUTH EVERY NIGHT, Disp: 90 tablet, Rfl: 1    Calcium 500-100 MG-UNIT chewable tablet, Chew 3 tablets Daily. HOLD PRIOR TO SURG, Disp: , Rfl:     diphenhydrAMINE (Benadryl Allergy) 25 MG tablet, 1 tab 6pm 1 tab 11pm the night before procedure 1 tab 6am morning of procedure, Disp: 3 tablet, Rfl: 0    Enoxaparin Sodium (LOVENOX) 40 MG/0.4ML solution prefilled syringe syringe, Inject 0.4 mL under the skin into the appropriate area as directed Daily for 14 days. Start after surgery unless instructed otherwise, Disp: 5.6 mL, Rfl: 0    ferrous sulfate 140 (45 Fe) MG tablet controlled-release tablet, Take  by mouth Daily With Breakfast. HOLD PRIOR TO SURG, Disp: , Rfl:     folic acid-vit B6-vit B12 (FOLBEE) 2.5-25-1 MG tablet tablet, Take 1 tablet by mouth Daily., Disp: 40 tablet, Rfl: 0    indapamide (LOZOL) 2.5 MG tablet, Take 1 tablet by mouth Daily As Needed (swelling)., Disp: 30 tablet, Rfl: 6    lidocaine 3 % cream cream, Apply 1 dose topically to the appropriate area as directed Every 4 (Four) Hours As Needed., Disp: , Rfl:     lisinopril (PRINIVIL,ZESTRIL) 10 MG tablet, Take 2 tablets by mouth Daily. (Patient taking differently: Take 1 tablet by mouth Daily.), Disp: , Rfl:     loratadine (CLARITIN) 10 MG tablet, Take 1 tablet by mouth Daily As Needed., Disp: , Rfl:     multivitamin with minerals tablet tablet, Take 1 tablet by mouth Daily. HOLD PRIOR TO SURG, Disp: , Rfl:     predniSONE (DELTASONE) 50 MG tablet, 1 tab 6pm 1 tab 11pm the night  before procedure 1 tab 6am morning of procedure, Disp: 3 tablet, Rfl: 0    ursodiol (Actigall) 300 MG capsule, Take 1 capsule by mouth 2 (Two) Times a Day., Disp: 60 capsule, Rfl: 5    vitamin B-12 (CYANOCOBALAMIN) 100 MCG tablet, Take 1 tablet by mouth Daily. HOLD PRIOR TO SURGERY, Disp: , Rfl:     vitamin C (ASCORBIC ACID) 250 MG tablet, Take 1 tablet by mouth 2 (Two) Times a Week. HOLD PRIOR TO SURG, Disp: , Rfl:     Social History     Socioeconomic History    Marital status: Single   Tobacco Use    Smoking status: Never     Passive exposure: Never    Smokeless tobacco: Never   Vaping Use    Vaping status: Never Used   Substance and Sexual Activity    Alcohol use: Not Currently    Drug use: Never    Sexual activity: Defer     Birth control/protection: Condom, Surgical, Hysterectomy       Family History   Adopted: Yes   Problem Relation Age of Onset    Diabetes Father     Other (hypoglycemic) Father     Malig Hyperthermia Neg Hx        Review of Systems:  Review of Systems   Constitutional:  Positive for fatigue.   Musculoskeletal:  Positive for arthralgias.   All other systems reviewed and are negative.      Physical Exam:  Vital Signs:  Weight: 115 kg (253 lb)   Body mass index is 39.02 kg/m².  Temp: 97.1 °F (36.2 °C)   Heart Rate: 71   BP: 120/79     Physical Exam  Vitals reviewed.   HENT:      Head: Normocephalic and atraumatic.      Mouth/Throat:      Mouth: Mucous membranes are moist.      Pharynx: Oropharynx is clear.   Eyes:      General: No scleral icterus.     Extraocular Movements: Extraocular movements intact.      Conjunctiva/sclera: Conjunctivae normal.      Pupils: Pupils are equal, round, and reactive to light.   Neck:      Thyroid: No thyromegaly.   Cardiovascular:      Rate and Rhythm: Normal rate.   Pulmonary:      Effort: Pulmonary effort is normal. No respiratory distress.      Breath sounds: Normal breath sounds. No stridor. No wheezing or rhonchi.   Abdominal:      General: Bowel sounds are  normal.      Palpations: Abdomen is soft.      Tenderness: There is no abdominal tenderness. There is no right CVA tenderness, left CVA tenderness, guarding or rebound.      Hernia: No hernia is present.   Musculoskeletal:         General: Normal range of motion.      Cervical back: Normal range of motion and neck supple.   Lymphadenopathy:      Cervical: No cervical adenopathy.   Skin:     General: Skin is warm and dry.      Findings: No erythema.   Neurological:      Mental Status: She is alert and oriented to person, place, and time.   Psychiatric:         Mood and Affect: Mood normal.         Behavior: Behavior normal.         Thought Content: Thought content normal.         Judgment: Judgment normal.         Assessment:    Jessica Mcdowell is a 54 y.o. year old female with medically complicated severe obesity with a BMI of Body mass index is 39.02 kg/m². and multiple co-morbidities listed in the encounter diagnosis.    I think she is an appropriate candidate for this surgery, and is ready to proceed.    Plan/Discussion/Summary:  No idle hernia per me.  No PPI.  H. pylori negative.  Patient status post abdominoplasty.  Patient status post gastric sleeve with sliding hiatal hernia repair by me August 2022.  Patient understands that they are at an increased risk for complications because of this being a revisional surgery/conversion  to another procedure.  The patient understands the increased risk of gastric injury/leak, bleeding, etc because of the scarring and previous surgery.  Also increased risk of other complications that are listed because of increased OR time.    The patient has returned to the office for a surgical consultation and has requested to proceed with gastric bypass to gastric restrictive procedure with small intestinal reconstruction to limit absorption. I have had the opportunity to obtain the history, examine the patient and review the patient's chart.  The procedure could be done  laparoscopically and or robotically.    The patient understands that surgery is a tool and that weight loss is not guaranteed but only seen in the context of appropriate use, regular follow up, exercise and making appropriate food choices.      I have personally discussed the potential complications of the laparoscopic gastric bypass with this patient.  The patient is well aware of the potential complications of the surgery that include but not limited to bleeding, infections, deep venous thrombosis, pulmonary embolism, pulmonary complications such as pneumonia, cardiac events, hernias, small bowel obstruction, damage to the spleen or other organs, bowel injury, disfiguring scars, failure to lose weight, need for additional surgery, conversion to an open procedure and death.  I also discussed the risks that apply in particular to the gastric bypass such as the leaking of stomach and/or intestinal contents at the staple or suture line, the development of an intra-abdominal abscess,  strictures, ulcers, and vitamin/mineral deficiencies.  The patient was strongly advised to avoid smoking and the use of non-steroidal anti-inflammatory drugs such as ibuprofen, Motrin and Advil in the postoperative period and understands the increased risk of ulcer formation, perforation, death if they did not stop the use of these medications/substances.     The risks, benefits, potential complications and alternative therapies were discussed at great lengths as outlined in our extensive consent forms, online consent, and educational teaching processes.      The patient has confirmed participation in the program's extensive educational activities.      All questions and concerns were answered to the patient's satisfaction.  The patient now wishes to proceed with surgery.    The patient agreed to a postoperative course of anticoagulant therapy.    I instructed patient that the surgery could be laparoscopically and/or robotically.    I  instructed patient to start on a H2 blocker or proton pump inhibitor if not already on one of these medications.    I explained in detail the procedures that are in the consent.  All of these procedures have a chance to convert to open if any technical challenges or complications do occur.  Bariatric surgery is not cosmetic surgery but rather a tool to help a patient make a life-long commitment lifestyle change including diet, exercise, behavior changes, and taking supplemental vitamins and minerals.    Problems after surgery may require more operations to correct them.    The risks, benefits, alternatives, and potential complications of all of the procedures were explained in detail including, but not limited to death, anesthesia and medication adverse effect, deep venous thrombosis, pulmonary embolism, trocar site/incisional hernia, wound infection, abdominal infection, bleeding, failure to lose weight, gain weight, a change in body image, metabolic complications with vitamin deficiences and anemia.    Weight loss expectations were discussed with the patient in detail. The weight loss operations most commonly performed are the sleeve gastrectomy and the Radhika-en-Y gastric bypass. These operations result in weight losses up to approximately 25-35% of initial body weight 12 to 24 months after surgery with the gastric bypass usually the higher percent of weight loss but depends on patient using the tool.    For the gastric bypass and loop duodenal switch (BRIAN-S) the risks include but not limited to the following early complications:  Anastomotic leak/peritonitis, Radhika/Alimentary/biliopancreatic limb obstruction, severe & minor wound infection/seroma, and nausea/vomiting.  Late complications can include but are not limited to malnutrition, vitamin deficiencies, frequent loose stools,  stomal stenosis, marginal ulcer, bowel obstruction, intussusception, internal, and incisional hernia.    Regarding the gastric sleeve,  there is higher risk of dysphagia and reflux leading to possible Ram's esophagus compared to a gastric bypass, as well as risk of internal visceral/organ injury, splenectomy, bleeding, infection, leak (which could require further intervention possible conversion to Radhika-en-Y gastric bypass), stenosis and possibility of regaining weight.    Jessica was counseled regarding diagnostic results, instructions for management, risk factor reductions, prognosis, patient and family education, impressions, risks and benefits of treatment options and importance of compliance with treatment. Total time of the encounter was over 45 minutes counseling the patient regarding the procedure as above and reviewing as well as ordering labs, medications and the procedure.  The chart was also reviewed prior to seeing the patient reviewing previous testing, studies and labs.    Jessica understands the surgical procedures and the different surgical options that are available.  She understands the lifestyle changes that are required after surgery and has agreed to follow the guidelines outlined in the weight management program.  She also expressed understanding of the risks involved and had all of female questions answered and desires to proceed.      Randal Calix MD  9/20/2024

## 2024-09-24 ENCOUNTER — ANESTHESIA EVENT (OUTPATIENT)
Dept: PERIOP | Facility: HOSPITAL | Age: 54
End: 2024-09-24
Payer: COMMERCIAL

## 2024-09-25 ENCOUNTER — ANESTHESIA (OUTPATIENT)
Dept: PERIOP | Facility: HOSPITAL | Age: 54
End: 2024-09-25
Payer: COMMERCIAL

## 2024-09-25 ENCOUNTER — HOSPITAL ENCOUNTER (INPATIENT)
Facility: HOSPITAL | Age: 54
LOS: 1 days | Discharge: HOME | End: 2024-09-26
Attending: SURGERY | Admitting: SURGERY
Payer: COMMERCIAL

## 2024-09-25 DIAGNOSIS — E66.812 OBESITY, CLASS II, BMI 35-39.9: ICD-10-CM

## 2024-09-25 DIAGNOSIS — E66.9 OBESITY, CLASS II, BMI 35-39.9: ICD-10-CM

## 2024-09-25 PROCEDURE — 25810000003 LACTATED RINGERS SOLUTION: Performed by: SURGERY

## 2024-09-25 PROCEDURE — 25810000003 LACTATED RINGERS PER 1000 ML: Performed by: SURGERY

## 2024-09-25 PROCEDURE — 25010000002 KETOROLAC TROMETHAMINE PER 15 MG: Performed by: SURGERY

## 2024-09-25 PROCEDURE — 25010000002 PROPOFOL 10 MG/ML EMULSION

## 2024-09-25 PROCEDURE — 25010000002 SUGAMMADEX 200 MG/2ML SOLUTION

## 2024-09-25 PROCEDURE — 25010000002 MIDAZOLAM PER 1 MG: Performed by: STUDENT IN AN ORGANIZED HEALTH CARE EDUCATION/TRAINING PROGRAM

## 2024-09-25 PROCEDURE — 43645 LAP GASTR BYPASS INCL SMLL I: CPT | Performed by: SURGERY

## 2024-09-25 PROCEDURE — 25810000003 SODIUM CHLORIDE PER 500 ML: Performed by: SURGERY

## 2024-09-25 PROCEDURE — 25010000002 ROPIVACAINE PER 1 MG: Performed by: SURGERY

## 2024-09-25 PROCEDURE — 25010000002 EPINEPHRINE 1 MG/ML SOLUTION 30 ML VIAL: Performed by: SURGERY

## 2024-09-25 PROCEDURE — 25010000002 FENTANYL CITRATE (PF) 50 MCG/ML SOLUTION

## 2024-09-25 PROCEDURE — 8E0W4CZ ROBOTIC ASSISTED PROCEDURE OF TRUNK REGION, PERCUTANEOUS ENDOSCOPIC APPROACH: ICD-10-PCS | Performed by: SURGERY

## 2024-09-25 PROCEDURE — 0D164ZA BYPASS STOMACH TO JEJUNUM, PERCUTANEOUS ENDOSCOPIC APPROACH: ICD-10-PCS | Performed by: SURGERY

## 2024-09-25 PROCEDURE — 25010000002 METOCLOPRAMIDE PER 10 MG: Performed by: SURGERY

## 2024-09-25 PROCEDURE — 25010000002 ONDANSETRON PER 1 MG

## 2024-09-25 PROCEDURE — 25010000002 GLYCOPYRROLATE 0.2 MG/ML SOLUTION

## 2024-09-25 PROCEDURE — 25010000002 FOSAPREPITANT PER 1 MG: Performed by: SURGERY

## 2024-09-25 PROCEDURE — 25810000003 SODIUM CHLORIDE 0.9 % SOLUTION 1,000 ML FLEX CONT: Performed by: SURGERY

## 2024-09-25 PROCEDURE — 25010000002 PROPOFOL 200 MG/20ML EMULSION

## 2024-09-25 PROCEDURE — 25010000002 THIAMINE HCL 200 MG/2ML SOLUTION 2 ML VIAL: Performed by: SURGERY

## 2024-09-25 PROCEDURE — 25010000002 CLONIDINE PER 1 MG: Performed by: SURGERY

## 2024-09-25 PROCEDURE — 43645 LAP GASTR BYPASS INCL SMLL I: CPT | Performed by: NURSE PRACTITIONER

## 2024-09-25 PROCEDURE — 25010000002 ACETAMINOPHEN 10 MG/ML SOLUTION

## 2024-09-25 PROCEDURE — 25010000002 CEFAZOLIN PER 500 MG: Performed by: SURGERY

## 2024-09-25 PROCEDURE — 25010000002 MAGNESIUM SULFATE PER 500 MG OF MAGNESIUM

## 2024-09-25 PROCEDURE — 25010000002 FENTANYL CITRATE (PF) 100 MCG/2ML SOLUTION

## 2024-09-25 DEVICE — STAPLER 60 RELOAD BLUE
Type: IMPLANTABLE DEVICE | Site: ABDOMEN | Status: FUNCTIONAL
Brand: SUREFORM

## 2024-09-25 DEVICE — STAPLER 60 RELOAD WHITE
Type: IMPLANTABLE DEVICE | Site: ABDOMEN | Status: FUNCTIONAL
Brand: SUREFORM

## 2024-09-25 DEVICE — STAPLER 60 RELOAD GREEN
Type: IMPLANTABLE DEVICE | Site: ABDOMEN | Status: FUNCTIONAL
Brand: SUREFORM

## 2024-09-25 RX ORDER — NALOXONE HCL 0.4 MG/ML
0.1 VIAL (ML) INJECTION
Status: DISCONTINUED | OUTPATIENT
Start: 2024-09-25 | End: 2024-09-26 | Stop reason: HOSPADM

## 2024-09-25 RX ORDER — GLYCOPYRROLATE 0.2 MG/ML
INJECTION INTRAMUSCULAR; INTRAVENOUS AS NEEDED
Status: DISCONTINUED | OUTPATIENT
Start: 2024-09-25 | End: 2024-09-25 | Stop reason: SURG

## 2024-09-25 RX ORDER — DROPERIDOL 2.5 MG/ML
0.62 INJECTION, SOLUTION INTRAMUSCULAR; INTRAVENOUS
Status: DISCONTINUED | OUTPATIENT
Start: 2024-09-25 | End: 2024-09-25 | Stop reason: HOSPADM

## 2024-09-25 RX ORDER — SODIUM CHLORIDE 9 MG/ML
40 INJECTION, SOLUTION INTRAVENOUS AS NEEDED
Status: DISCONTINUED | OUTPATIENT
Start: 2024-09-25 | End: 2024-09-25 | Stop reason: HOSPADM

## 2024-09-25 RX ORDER — LIDOCAINE HYDROCHLORIDE 20 MG/ML
INJECTION, SOLUTION EPIDURAL; INFILTRATION; INTRACAUDAL; PERINEURAL AS NEEDED
Status: DISCONTINUED | OUTPATIENT
Start: 2024-09-25 | End: 2024-09-25 | Stop reason: SURG

## 2024-09-25 RX ORDER — MAGNESIUM SULFATE HEPTAHYDRATE 500 MG/ML
INJECTION, SOLUTION INTRAMUSCULAR; INTRAVENOUS AS NEEDED
Status: DISCONTINUED | OUTPATIENT
Start: 2024-09-25 | End: 2024-09-25 | Stop reason: SURG

## 2024-09-25 RX ORDER — PANTOPRAZOLE SODIUM 40 MG/10ML
40 INJECTION, POWDER, LYOPHILIZED, FOR SOLUTION INTRAVENOUS ONCE
Status: COMPLETED | OUTPATIENT
Start: 2024-09-25 | End: 2024-09-25

## 2024-09-25 RX ORDER — FENTANYL CITRATE 50 UG/ML
50 INJECTION, SOLUTION INTRAMUSCULAR; INTRAVENOUS
Status: DISCONTINUED | OUTPATIENT
Start: 2024-09-25 | End: 2024-09-25 | Stop reason: HOSPADM

## 2024-09-25 RX ORDER — SODIUM CHLORIDE, SODIUM LACTATE, POTASSIUM CHLORIDE, CALCIUM CHLORIDE 600; 310; 30; 20 MG/100ML; MG/100ML; MG/100ML; MG/100ML
9 INJECTION, SOLUTION INTRAVENOUS CONTINUOUS
Status: DISCONTINUED | OUTPATIENT
Start: 2024-09-25 | End: 2024-09-25

## 2024-09-25 RX ORDER — FAMOTIDINE 10 MG/ML
20 INJECTION, SOLUTION INTRAVENOUS EVERY 12 HOURS SCHEDULED
Status: DISCONTINUED | OUTPATIENT
Start: 2024-09-25 | End: 2024-09-26 | Stop reason: HOSPADM

## 2024-09-25 RX ORDER — CYANOCOBALAMIN 1000 UG/ML
1000 INJECTION, SOLUTION INTRAMUSCULAR; SUBCUTANEOUS ONCE
Status: COMPLETED | OUTPATIENT
Start: 2024-09-26 | End: 2024-09-26

## 2024-09-25 RX ORDER — DIPHENHYDRAMINE HYDROCHLORIDE 50 MG/ML
25 INJECTION INTRAMUSCULAR; INTRAVENOUS EVERY 4 HOURS PRN
Status: DISCONTINUED | OUTPATIENT
Start: 2024-09-25 | End: 2024-09-26 | Stop reason: HOSPADM

## 2024-09-25 RX ORDER — DROPERIDOL 2.5 MG/ML
1.25 INJECTION, SOLUTION INTRAMUSCULAR; INTRAVENOUS
Status: DISCONTINUED | OUTPATIENT
Start: 2024-09-25 | End: 2024-09-26 | Stop reason: HOSPADM

## 2024-09-25 RX ORDER — METOCLOPRAMIDE HYDROCHLORIDE 5 MG/ML
10 INJECTION INTRAMUSCULAR; INTRAVENOUS EVERY 6 HOURS
Status: DISCONTINUED | OUTPATIENT
Start: 2024-09-25 | End: 2024-09-26 | Stop reason: HOSPADM

## 2024-09-25 RX ORDER — HYDROMORPHONE HYDROCHLORIDE 1 MG/ML
0.5 INJECTION, SOLUTION INTRAMUSCULAR; INTRAVENOUS; SUBCUTANEOUS
Status: DISCONTINUED | OUTPATIENT
Start: 2024-09-25 | End: 2024-09-26 | Stop reason: HOSPADM

## 2024-09-25 RX ORDER — MIRTAZAPINE 15 MG/1
15 TABLET, ORALLY DISINTEGRATING ORAL NIGHTLY PRN
Status: DISCONTINUED | OUTPATIENT
Start: 2024-09-25 | End: 2024-09-26 | Stop reason: HOSPADM

## 2024-09-25 RX ORDER — ACETAMINOPHEN 500 MG
1000 TABLET ORAL EVERY 6 HOURS
Status: DISCONTINUED | OUTPATIENT
Start: 2024-09-25 | End: 2024-09-26 | Stop reason: HOSPADM

## 2024-09-25 RX ORDER — NALOXONE HCL 0.4 MG/ML
0.2 VIAL (ML) INJECTION AS NEEDED
Status: DISCONTINUED | OUTPATIENT
Start: 2024-09-25 | End: 2024-09-25 | Stop reason: HOSPADM

## 2024-09-25 RX ORDER — SODIUM CHLORIDE 0.9 % (FLUSH) 0.9 %
3-10 SYRINGE (ML) INJECTION AS NEEDED
Status: DISCONTINUED | OUTPATIENT
Start: 2024-09-25 | End: 2024-09-25 | Stop reason: HOSPADM

## 2024-09-25 RX ORDER — FENTANYL CITRATE 50 UG/ML
50 INJECTION, SOLUTION INTRAMUSCULAR; INTRAVENOUS ONCE AS NEEDED
Status: DISCONTINUED | OUTPATIENT
Start: 2024-09-25 | End: 2024-09-25 | Stop reason: HOSPADM

## 2024-09-25 RX ORDER — LISINOPRIL 10 MG/1
10 TABLET ORAL DAILY
Status: DISCONTINUED | OUTPATIENT
Start: 2024-09-25 | End: 2024-09-26 | Stop reason: HOSPADM

## 2024-09-25 RX ORDER — SODIUM CHLORIDE 0.9 % (FLUSH) 0.9 %
10 SYRINGE (ML) INJECTION EVERY 12 HOURS SCHEDULED
Status: DISCONTINUED | OUTPATIENT
Start: 2024-09-25 | End: 2024-09-25 | Stop reason: HOSPADM

## 2024-09-25 RX ORDER — LIDOCAINE HYDROCHLORIDE 10 MG/ML
0.5 INJECTION, SOLUTION INFILTRATION; PERINEURAL ONCE AS NEEDED
Status: DISCONTINUED | OUTPATIENT
Start: 2024-09-25 | End: 2024-09-25 | Stop reason: HOSPADM

## 2024-09-25 RX ORDER — LORAZEPAM 1 MG/1
1 TABLET ORAL EVERY 12 HOURS PRN
Status: DISCONTINUED | OUTPATIENT
Start: 2024-09-25 | End: 2024-09-26 | Stop reason: HOSPADM

## 2024-09-25 RX ORDER — FENTANYL CITRATE 50 UG/ML
INJECTION, SOLUTION INTRAMUSCULAR; INTRAVENOUS AS NEEDED
Status: DISCONTINUED | OUTPATIENT
Start: 2024-09-25 | End: 2024-09-25 | Stop reason: SURG

## 2024-09-25 RX ORDER — HYDROCODONE BITARTRATE AND ACETAMINOPHEN 5; 325 MG/1; MG/1
1 TABLET ORAL ONCE AS NEEDED
Status: DISCONTINUED | OUTPATIENT
Start: 2024-09-25 | End: 2024-09-25 | Stop reason: HOSPADM

## 2024-09-25 RX ORDER — TRAMADOL HYDROCHLORIDE 50 MG/1
50 TABLET ORAL EVERY 4 HOURS PRN
Status: DISCONTINUED | OUTPATIENT
Start: 2024-09-25 | End: 2024-09-26 | Stop reason: HOSPADM

## 2024-09-25 RX ORDER — HYDROMORPHONE HYDROCHLORIDE 1 MG/ML
0.5 INJECTION, SOLUTION INTRAMUSCULAR; INTRAVENOUS; SUBCUTANEOUS
Status: DISCONTINUED | OUTPATIENT
Start: 2024-09-25 | End: 2024-09-25 | Stop reason: HOSPADM

## 2024-09-25 RX ORDER — DIPHENHYDRAMINE HYDROCHLORIDE 50 MG/ML
12.5 INJECTION INTRAMUSCULAR; INTRAVENOUS
Status: DISCONTINUED | OUTPATIENT
Start: 2024-09-25 | End: 2024-09-25 | Stop reason: HOSPADM

## 2024-09-25 RX ORDER — HYDROMORPHONE HYDROCHLORIDE 2 MG/1
2 TABLET ORAL EVERY 4 HOURS PRN
Status: DISCONTINUED | OUTPATIENT
Start: 2024-09-25 | End: 2024-09-26 | Stop reason: HOSPADM

## 2024-09-25 RX ORDER — SCOLOPAMINE TRANSDERMAL SYSTEM 1 MG/1
1 PATCH, EXTENDED RELEASE TRANSDERMAL CONTINUOUS
Status: DISCONTINUED | OUTPATIENT
Start: 2024-09-25 | End: 2024-09-26 | Stop reason: HOSPADM

## 2024-09-25 RX ORDER — OXYCODONE AND ACETAMINOPHEN 7.5; 325 MG/1; MG/1
1 TABLET ORAL EVERY 4 HOURS PRN
Status: DISCONTINUED | OUTPATIENT
Start: 2024-09-25 | End: 2024-09-25 | Stop reason: HOSPADM

## 2024-09-25 RX ORDER — IPRATROPIUM BROMIDE AND ALBUTEROL SULFATE 2.5; .5 MG/3ML; MG/3ML
3 SOLUTION RESPIRATORY (INHALATION) ONCE AS NEEDED
Status: DISCONTINUED | OUTPATIENT
Start: 2024-09-25 | End: 2024-09-25 | Stop reason: HOSPADM

## 2024-09-25 RX ORDER — SODIUM CHLORIDE 0.9 % (FLUSH) 0.9 %
3 SYRINGE (ML) INJECTION EVERY 12 HOURS SCHEDULED
Status: DISCONTINUED | OUTPATIENT
Start: 2024-09-25 | End: 2024-09-25 | Stop reason: HOSPADM

## 2024-09-25 RX ORDER — METOCLOPRAMIDE HYDROCHLORIDE 5 MG/ML
10 INJECTION INTRAMUSCULAR; INTRAVENOUS ONCE
Status: COMPLETED | OUTPATIENT
Start: 2024-09-25 | End: 2024-09-25

## 2024-09-25 RX ORDER — HYDRALAZINE HYDROCHLORIDE 20 MG/ML
10 INJECTION INTRAMUSCULAR; INTRAVENOUS
Status: DISCONTINUED | OUTPATIENT
Start: 2024-09-25 | End: 2024-09-26 | Stop reason: HOSPADM

## 2024-09-25 RX ORDER — ACETAMINOPHEN 160 MG/5ML
975 SOLUTION ORAL EVERY 6 HOURS
Status: DISCONTINUED | OUTPATIENT
Start: 2024-09-25 | End: 2024-09-26 | Stop reason: HOSPADM

## 2024-09-25 RX ORDER — ACETAMINOPHEN 10 MG/ML
INJECTION, SOLUTION INTRAVENOUS AS NEEDED
Status: DISCONTINUED | OUTPATIENT
Start: 2024-09-25 | End: 2024-09-25 | Stop reason: SURG

## 2024-09-25 RX ORDER — ONDANSETRON 2 MG/ML
4 INJECTION INTRAMUSCULAR; INTRAVENOUS ONCE AS NEEDED
Status: DISCONTINUED | OUTPATIENT
Start: 2024-09-25 | End: 2024-09-25 | Stop reason: HOSPADM

## 2024-09-25 RX ORDER — CHLORHEXIDINE GLUCONATE ORAL RINSE 1.2 MG/ML
15 SOLUTION DENTAL SEE ADMIN INSTRUCTIONS
Status: COMPLETED | OUTPATIENT
Start: 2024-09-25 | End: 2024-09-25

## 2024-09-25 RX ORDER — ALBUTEROL SULFATE 0.83 MG/ML
2.5 SOLUTION RESPIRATORY (INHALATION) EVERY 4 HOURS PRN
Status: DISCONTINUED | OUTPATIENT
Start: 2024-09-25 | End: 2024-09-26 | Stop reason: HOSPADM

## 2024-09-25 RX ORDER — ROCURONIUM BROMIDE 10 MG/ML
INJECTION, SOLUTION INTRAVENOUS AS NEEDED
Status: DISCONTINUED | OUTPATIENT
Start: 2024-09-25 | End: 2024-09-25 | Stop reason: SURG

## 2024-09-25 RX ORDER — HYDRALAZINE HYDROCHLORIDE 20 MG/ML
5 INJECTION INTRAMUSCULAR; INTRAVENOUS
Status: DISCONTINUED | OUTPATIENT
Start: 2024-09-25 | End: 2024-09-25 | Stop reason: HOSPADM

## 2024-09-25 RX ORDER — SODIUM CHLORIDE 0.9 % (FLUSH) 0.9 %
1-10 SYRINGE (ML) INJECTION AS NEEDED
Status: DISCONTINUED | OUTPATIENT
Start: 2024-09-25 | End: 2024-09-25 | Stop reason: HOSPADM

## 2024-09-25 RX ORDER — SODIUM CHLORIDE 9 MG/ML
INJECTION, SOLUTION INTRAVENOUS AS NEEDED
Status: DISCONTINUED | OUTPATIENT
Start: 2024-09-25 | End: 2024-09-25 | Stop reason: HOSPADM

## 2024-09-25 RX ORDER — EPHEDRINE SULFATE 50 MG/ML
5 INJECTION, SOLUTION INTRAVENOUS ONCE AS NEEDED
Status: DISCONTINUED | OUTPATIENT
Start: 2024-09-25 | End: 2024-09-25 | Stop reason: HOSPADM

## 2024-09-25 RX ORDER — SODIUM CHLORIDE, SODIUM LACTATE, POTASSIUM CHLORIDE, CALCIUM CHLORIDE 600; 310; 30; 20 MG/100ML; MG/100ML; MG/100ML; MG/100ML
100 INJECTION, SOLUTION INTRAVENOUS CONTINUOUS
Status: DISCONTINUED | OUTPATIENT
Start: 2024-09-25 | End: 2024-09-25

## 2024-09-25 RX ORDER — PROMETHAZINE HYDROCHLORIDE 25 MG/1
25 SUPPOSITORY RECTAL ONCE AS NEEDED
Status: DISCONTINUED | OUTPATIENT
Start: 2024-09-25 | End: 2024-09-25 | Stop reason: HOSPADM

## 2024-09-25 RX ORDER — LABETALOL HYDROCHLORIDE 5 MG/ML
5 INJECTION, SOLUTION INTRAVENOUS
Status: DISCONTINUED | OUTPATIENT
Start: 2024-09-25 | End: 2024-09-25 | Stop reason: HOSPADM

## 2024-09-25 RX ORDER — GABAPENTIN 300 MG/1
300 CAPSULE ORAL ONCE
Status: COMPLETED | OUTPATIENT
Start: 2024-09-25 | End: 2024-09-25

## 2024-09-25 RX ORDER — PROMETHAZINE HYDROCHLORIDE 25 MG/1
12.5 TABLET ORAL ONCE
Status: DISCONTINUED | OUTPATIENT
Start: 2024-09-25 | End: 2024-09-25 | Stop reason: HOSPADM

## 2024-09-25 RX ORDER — FLUMAZENIL 0.1 MG/ML
0.2 INJECTION INTRAVENOUS AS NEEDED
Status: DISCONTINUED | OUTPATIENT
Start: 2024-09-25 | End: 2024-09-25 | Stop reason: HOSPADM

## 2024-09-25 RX ORDER — PROMETHAZINE HYDROCHLORIDE 25 MG/1
25 SUPPOSITORY RECTAL ONCE
Status: DISCONTINUED | OUTPATIENT
Start: 2024-09-25 | End: 2024-09-25 | Stop reason: HOSPADM

## 2024-09-25 RX ORDER — MIDAZOLAM HYDROCHLORIDE 1 MG/ML
1 INJECTION INTRAMUSCULAR; INTRAVENOUS
Status: DISCONTINUED | OUTPATIENT
Start: 2024-09-25 | End: 2024-09-25 | Stop reason: HOSPADM

## 2024-09-25 RX ORDER — SODIUM CHLORIDE, SODIUM LACTATE, POTASSIUM CHLORIDE, CALCIUM CHLORIDE 600; 310; 30; 20 MG/100ML; MG/100ML; MG/100ML; MG/100ML
150 INJECTION, SOLUTION INTRAVENOUS CONTINUOUS
Status: DISCONTINUED | OUTPATIENT
Start: 2024-09-25 | End: 2024-09-26 | Stop reason: HOSPADM

## 2024-09-25 RX ORDER — ONDANSETRON 2 MG/ML
INJECTION INTRAMUSCULAR; INTRAVENOUS AS NEEDED
Status: DISCONTINUED | OUTPATIENT
Start: 2024-09-25 | End: 2024-09-25 | Stop reason: SURG

## 2024-09-25 RX ORDER — PROPOFOL 10 MG/ML
INJECTION, EMULSION INTRAVENOUS AS NEEDED
Status: DISCONTINUED | OUTPATIENT
Start: 2024-09-25 | End: 2024-09-25 | Stop reason: SURG

## 2024-09-25 RX ORDER — PROMETHAZINE HYDROCHLORIDE 25 MG/1
25 TABLET ORAL ONCE AS NEEDED
Status: DISCONTINUED | OUTPATIENT
Start: 2024-09-25 | End: 2024-09-25 | Stop reason: HOSPADM

## 2024-09-25 RX ADMIN — METOCLOPRAMIDE 10 MG: 5 INJECTION, SOLUTION INTRAMUSCULAR; INTRAVENOUS at 17:04

## 2024-09-25 RX ADMIN — FAMOTIDINE 20 MG: 10 INJECTION INTRAVENOUS at 21:14

## 2024-09-25 RX ADMIN — PROPOFOL 180 MCG/KG/MIN: 10 INJECTION, EMULSION INTRAVENOUS at 10:37

## 2024-09-25 RX ADMIN — MIDAZOLAM 1 MG: 1 INJECTION INTRAMUSCULAR; INTRAVENOUS at 08:10

## 2024-09-25 RX ADMIN — LIDOCAINE HYDROCHLORIDE 100 MG: 20 INJECTION, SOLUTION EPIDURAL; INFILTRATION; INTRACAUDAL; PERINEURAL at 10:31

## 2024-09-25 RX ADMIN — SODIUM CHLORIDE, POTASSIUM CHLORIDE, SODIUM LACTATE AND CALCIUM CHLORIDE 150 ML/HR: 600; 310; 30; 20 INJECTION, SOLUTION INTRAVENOUS at 23:53

## 2024-09-25 RX ADMIN — ROCURONIUM BROMIDE 80 MG: 10 INJECTION, SOLUTION INTRAVENOUS at 10:31

## 2024-09-25 RX ADMIN — THIAMINE HYDROCHLORIDE 250 ML/HR: 100 INJECTION, SOLUTION INTRAMUSCULAR; INTRAVENOUS at 23:50

## 2024-09-25 RX ADMIN — PROPOFOL 220 MG: 10 INJECTION, EMULSION INTRAVENOUS at 10:31

## 2024-09-25 RX ADMIN — ROCURONIUM BROMIDE 20 MG: 10 INJECTION, SOLUTION INTRAVENOUS at 10:53

## 2024-09-25 RX ADMIN — ACETAMINOPHEN 1000 MG: 500 TABLET ORAL at 23:49

## 2024-09-25 RX ADMIN — Medication 30 MG: at 10:40

## 2024-09-25 RX ADMIN — SCOPALAMINE 1 PATCH: 1 PATCH, EXTENDED RELEASE TRANSDERMAL at 07:50

## 2024-09-25 RX ADMIN — SODIUM CHLORIDE, POTASSIUM CHLORIDE, SODIUM LACTATE AND CALCIUM CHLORIDE 150 ML/HR: 600; 310; 30; 20 INJECTION, SOLUTION INTRAVENOUS at 17:10

## 2024-09-25 RX ADMIN — SODIUM CHLORIDE 2 G: 900 INJECTION INTRAVENOUS at 09:56

## 2024-09-25 RX ADMIN — PANTOPRAZOLE SODIUM 40 MG: 40 INJECTION, POWDER, FOR SOLUTION INTRAVENOUS at 08:05

## 2024-09-25 RX ADMIN — LISINOPRIL 10 MG: 10 TABLET ORAL at 17:04

## 2024-09-25 RX ADMIN — FAMOTIDINE 20 MG: 10 INJECTION INTRAVENOUS at 17:04

## 2024-09-25 RX ADMIN — ONDANSETRON 4 MG: 2 INJECTION INTRAMUSCULAR; INTRAVENOUS at 10:40

## 2024-09-25 RX ADMIN — SODIUM CHLORIDE, POTASSIUM CHLORIDE, SODIUM LACTATE AND CALCIUM CHLORIDE 500 ML: 600; 310; 30; 20 INJECTION, SOLUTION INTRAVENOUS at 08:05

## 2024-09-25 RX ADMIN — SODIUM CHLORIDE, POTASSIUM CHLORIDE, SODIUM LACTATE AND CALCIUM CHLORIDE: 600; 310; 30; 20 INJECTION, SOLUTION INTRAVENOUS at 10:18

## 2024-09-25 RX ADMIN — GLYCOPYRROLATE 0.1 MG: 0.2 INJECTION INTRAMUSCULAR; INTRAVENOUS at 10:40

## 2024-09-25 RX ADMIN — METOCLOPRAMIDE 10 MG: 5 INJECTION, SOLUTION INTRAMUSCULAR; INTRAVENOUS at 21:06

## 2024-09-25 RX ADMIN — TRAMADOL HYDROCHLORIDE 50 MG: 50 TABLET ORAL at 21:14

## 2024-09-25 RX ADMIN — 0.12% CHLORHEXIDINE GLUCONATE 15 ML: 1.2 RINSE ORAL at 07:50

## 2024-09-25 RX ADMIN — SODIUM CHLORIDE, POTASSIUM CHLORIDE, SODIUM LACTATE AND CALCIUM CHLORIDE: 600; 310; 30; 20 INJECTION, SOLUTION INTRAVENOUS at 11:58

## 2024-09-25 RX ADMIN — FENTANYL CITRATE 50 MCG: 50 INJECTION INTRAMUSCULAR; INTRAVENOUS at 10:40

## 2024-09-25 RX ADMIN — METOCLOPRAMIDE 10 MG: 5 INJECTION, SOLUTION INTRAMUSCULAR; INTRAVENOUS at 09:55

## 2024-09-25 RX ADMIN — ACETAMINOPHEN 1000 MG: 1000 INJECTION INTRAVENOUS at 11:49

## 2024-09-25 RX ADMIN — SUGAMMADEX 300 MG: 100 INJECTION, SOLUTION INTRAVENOUS at 12:06

## 2024-09-25 RX ADMIN — FENTANYL CITRATE 50 MCG: 50 INJECTION INTRAMUSCULAR; INTRAVENOUS at 10:54

## 2024-09-25 RX ADMIN — MAGNESIUM SULFATE HEPTAHYDRATE 2 G: 500 INJECTION, SOLUTION INTRAMUSCULAR; INTRAVENOUS at 10:40

## 2024-09-25 RX ADMIN — FOSAPREPITANT DIMEGLUMINE 150 MG: 150 INJECTION, POWDER, LYOPHILIZED, FOR SOLUTION INTRAVENOUS at 17:04

## 2024-09-25 RX ADMIN — GABAPENTIN 300 MG: 300 CAPSULE ORAL at 07:50

## 2024-09-25 RX ADMIN — ACETAMINOPHEN 1000 MG: 500 TABLET ORAL at 18:22

## 2024-09-25 RX ADMIN — FENTANYL CITRATE 50 MCG: 50 INJECTION, SOLUTION INTRAMUSCULAR; INTRAVENOUS at 13:32

## 2024-09-25 NOTE — OP NOTE
Physical PREOPERATIVE DIAGNOSIS:  Morbid obesity with multiple comorbidities as referenced in the most recent history and physical.     POSTOPERATIVE DIAGNOSES:  Morbid obesity with multiple comorbidities as referenced in the most recent history and physical.     PROCEDURES PERFORMED:  1.  Robotic/Laparascopic surgical gastric restrictive procedure with gastric bypass and small intestinal reconstruction to limit absorption.  2.  Tisseel application.      SURGEON:  Randal Calix Jr, MD     ASSISTANT: Robb BROUSSARD, VITALY      Surgery assisted and facilitated by a certified physician assistant, who directly resulted in a decreased operative time, anesthetic time, wound exposure, and possibly of an operative wound infection, thereby decreasing patient morbidity and ultimately total expenditures. The surgical assistant assisted in placement of trochars and retraction during the procedure.  Surgical assistant also assisted and closing incisions.      Surgery assisted and facilitated by a certified physician assistant, who directly resulted in a decreased operative time, anesthetic time, wound exposure, and possibly of an operative wound infection, thereby decreasing patient morbidity and ultimately total expenditures.     ANESTHESIA: General endotracheal and laparoscopic TAP block with Ropivacaine mixture     ESTIMATED BLOOD LOSS:  Less than 25 mL unless dictated below.     SPECIMENS:  None.     DRAINS:  None     COUNTS:  Correct.     COMPLICATIONS:  None.       INDICATIONS:   This patient presents for elective revisional bariatric surgery due to severe gerd and weight regain, scheduled for surgery after extensive preoperative evaluation, teaching and consent process.         Description of Procedure: The patient was brought to the operating room and placed supine upon the operating room table. SCD were placed.  The patient underwent uneventful general endotracheal anesthesia per the anesthesiology staff. The abdomen was  "prepped with ChloraPrep and draped in the usual sterile fashion.  Anesthesia staff passed a 36-Nepali bougie into the stomach to decompress the stomach and then was pulled back to the esophagus.      An 8 mm transverse incision was made just to the left of midline.  Using a 0 degree 5 mm laparoscope and a 5 mm Visiport trocar the abdomen was entered under direct visualization without difficulty and a pneumoperitoneum was established to 12-15mmHg with good opening pressures..  Exploratory laparoscopy revealed no evidence of injury from the entrance technique and no significant abnormalities unless addendum dictated below. Additional trocars were placed on the right and left side of the abdomen in standard fashion, both 8mm and 12mm ports used.  A Huey retractor was placed through an epigastric incision and used to elevate the left lobe of the liver.  The patient was then placed in slight reverse Trendelenburg.     I ran the bowel from the terminal illeum back approximately 250cm and marked the bowel with a sterile marker.     Next the 30 degree 8 mm scope was brought into the 8 mm port just to the left of the umbilicus after the corresponding trocar was docked to the da Halina robot.  Targeting then took place and then the rest of the trochars were docked to the robot and angled into position.  Instruments were brought in through the trochars in place for laparoscopic view.       I then took control of the surgical console.      I divided the omentum with the vessel sealer starting with the area near the transverse colon and worked my way superiorly to make way for the loop small bowel limb.      I then identified the ligament of Treitz and ran the bowel distally 200cm and brought this loop up to the gastric sleeve.  The sleeve was completely mobilized from previous adhesions throughout the entirety of the antrum. I used a blue load and fired it across the antrum  just distal to the \"crows foot\".      After this " I placed a suture from the Radhika limb to the sleeve to line up the bowel.  We were careful to construct our loop so that the afferent limb was superior and to the left side of the patient, the efferent limb was inferior which would allow for biliary drainage through gravity. Enterotomy and gastrotomy were made with hook cautery and SureFoam stapler introduced to perform the anastamosis with a white load.  The common enterotomy was then closed with 2-0 Vicryl suture in a running fashion, a 2nd layer performed with 2-0 Vicryl suture after the VisiG bougie had been advanced through the anastamosis.  The mesenteric defect was then closed with a running 2-0 nonabsorbable suture.     At this point I performed a leak test.  The anastomosis was submerged under saline and the area was insufflated with air.  No air bubbles were seen unless dictated below.  The bougie was also passed across the stoma without difficulty.  The irrigation fluid was suctioned out.     Tisseel was sprayed over the gastrojejunostomy .  The liver retractor was removed.     Then the abdomen was desufflated and all the trochars were removed under direct visualization.  No bleeding was noted.  All incisions were infiltrated with the local anesthetic.  All the skin incisions were closed with 4-0 Monocryl and surgical glue.     The hiatus was checked for a hernia and no hernia was detected

## 2024-09-25 NOTE — PLAN OF CARE
Goal Outcome Evaluation:  Plan of Care Reviewed With: patient        Progress: improving  Outcome Evaluation: Went over the plan of care and answered all questions. Vitals stable and pain is well controlled. Patient up ambulating in the halls and all medicaitons given without complications. Incisions are clean dry and intact and no other issues this shift.

## 2024-09-26 ENCOUNTER — READMISSION MANAGEMENT (OUTPATIENT)
Dept: CALL CENTER | Facility: HOSPITAL | Age: 54
End: 2024-09-26
Payer: COMMERCIAL

## 2024-09-26 VITALS
TEMPERATURE: 97.2 F | BODY MASS INDEX: 39.54 KG/M2 | RESPIRATION RATE: 16 BRPM | DIASTOLIC BLOOD PRESSURE: 73 MMHG | OXYGEN SATURATION: 100 % | WEIGHT: 256.39 LBS | SYSTOLIC BLOOD PRESSURE: 131 MMHG | HEART RATE: 50 BPM

## 2024-09-26 LAB
ALBUMIN SERPL-MCNC: 3.7 G/DL (ref 3.5–5.2)
ALBUMIN/GLOB SERPL: 1.2 G/DL
ALP SERPL-CCNC: 103 U/L (ref 39–117)
ALT SERPL W P-5'-P-CCNC: 13 U/L (ref 1–33)
ANION GAP SERPL CALCULATED.3IONS-SCNC: 8.5 MMOL/L (ref 5–15)
AST SERPL-CCNC: 23 U/L (ref 1–32)
BASOPHILS # BLD AUTO: 0.01 10*3/MM3 (ref 0–0.2)
BASOPHILS NFR BLD AUTO: 0.2 % (ref 0–1.5)
BILIRUB SERPL-MCNC: 0.6 MG/DL (ref 0–1.2)
BUN SERPL-MCNC: 13 MG/DL (ref 6–20)
BUN/CREAT SERPL: 13.8 (ref 7–25)
CALCIUM SPEC-SCNC: 9.1 MG/DL (ref 8.6–10.5)
CHLORIDE SERPL-SCNC: 103 MMOL/L (ref 98–107)
CO2 SERPL-SCNC: 23.5 MMOL/L (ref 22–29)
CREAT SERPL-MCNC: 0.94 MG/DL (ref 0.57–1)
DEPRECATED RDW RBC AUTO: 41.9 FL (ref 37–54)
EGFRCR SERPLBLD CKD-EPI 2021: 73.2 ML/MIN/1.73
EOSINOPHIL # BLD AUTO: 0 10*3/MM3 (ref 0–0.4)
EOSINOPHIL NFR BLD AUTO: 0 % (ref 0.3–6.2)
ERYTHROCYTE [DISTWIDTH] IN BLOOD BY AUTOMATED COUNT: 14.5 % (ref 12.3–15.4)
GLOBULIN UR ELPH-MCNC: 3.1 GM/DL
GLUCOSE SERPL-MCNC: 109 MG/DL (ref 65–99)
HCT VFR BLD AUTO: 31.1 % (ref 34–46.6)
HGB BLD-MCNC: 9.9 G/DL (ref 12–15.9)
IMM GRANULOCYTES # BLD AUTO: 0.02 10*3/MM3 (ref 0–0.05)
IMM GRANULOCYTES NFR BLD AUTO: 0.4 % (ref 0–0.5)
LYMPHOCYTES # BLD AUTO: 0.61 10*3/MM3 (ref 0.7–3.1)
LYMPHOCYTES NFR BLD AUTO: 11.5 % (ref 19.6–45.3)
MAGNESIUM SERPL-MCNC: 2.4 MG/DL (ref 1.6–2.6)
MCH RBC QN AUTO: 25.4 PG (ref 26.6–33)
MCHC RBC AUTO-ENTMCNC: 31.8 G/DL (ref 31.5–35.7)
MCV RBC AUTO: 79.7 FL (ref 79–97)
MONOCYTES # BLD AUTO: 0.3 10*3/MM3 (ref 0.1–0.9)
MONOCYTES NFR BLD AUTO: 5.6 % (ref 5–12)
NEUTROPHILS NFR BLD AUTO: 4.38 10*3/MM3 (ref 1.7–7)
NEUTROPHILS NFR BLD AUTO: 82.3 % (ref 42.7–76)
NRBC BLD AUTO-RTO: 0 /100 WBC (ref 0–0.2)
PHOSPHATE SERPL-MCNC: 4.5 MG/DL (ref 2.5–4.5)
PLATELET # BLD AUTO: 136 10*3/MM3 (ref 140–450)
PMV BLD AUTO: 11.9 FL (ref 6–12)
POTASSIUM SERPL-SCNC: 4.3 MMOL/L (ref 3.5–5.2)
PROT SERPL-MCNC: 6.8 G/DL (ref 6–8.5)
RBC # BLD AUTO: 3.9 10*6/MM3 (ref 3.77–5.28)
SODIUM SERPL-SCNC: 135 MMOL/L (ref 136–145)
WBC NRBC COR # BLD AUTO: 5.32 10*3/MM3 (ref 3.4–10.8)

## 2024-09-26 PROCEDURE — 85025 COMPLETE CBC W/AUTO DIFF WBC: CPT | Performed by: SURGERY

## 2024-09-26 PROCEDURE — 80053 COMPREHEN METABOLIC PANEL: CPT | Performed by: SURGERY

## 2024-09-26 PROCEDURE — 25010000002 METOCLOPRAMIDE PER 10 MG: Performed by: SURGERY

## 2024-09-26 PROCEDURE — 83735 ASSAY OF MAGNESIUM: CPT | Performed by: SURGERY

## 2024-09-26 PROCEDURE — 84100 ASSAY OF PHOSPHORUS: CPT | Performed by: SURGERY

## 2024-09-26 PROCEDURE — 25010000002 CYANOCOBALAMIN PER 1000 MCG: Performed by: SURGERY

## 2024-09-26 RX ORDER — ONDANSETRON 4 MG/1
4 TABLET, ORALLY DISINTEGRATING ORAL EVERY 8 HOURS PRN
Qty: 20 TABLET | Refills: 0 | Status: SHIPPED | OUTPATIENT
Start: 2024-09-26

## 2024-09-26 RX ORDER — TRAMADOL HYDROCHLORIDE 50 MG/1
50 TABLET ORAL EVERY 6 HOURS PRN
Qty: 12 TABLET | Refills: 0 | Status: SHIPPED | OUTPATIENT
Start: 2024-09-26

## 2024-09-26 RX ORDER — FLUTICASONE PROPIONATE 50 UG/1
2 SPRAY, METERED NASAL DAILY
Status: DISCONTINUED | OUTPATIENT
Start: 2024-09-26 | End: 2024-09-26 | Stop reason: HOSPADM

## 2024-09-26 RX ORDER — SUCRALFATE 1 G/1
1 TABLET ORAL 4 TIMES DAILY
Qty: 120 TABLET | Refills: 0 | Status: SHIPPED | OUTPATIENT
Start: 2024-09-26 | End: 2024-10-26

## 2024-09-26 RX ADMIN — METOCLOPRAMIDE 10 MG: 5 INJECTION, SOLUTION INTRAMUSCULAR; INTRAVENOUS at 02:15

## 2024-09-26 RX ADMIN — METOCLOPRAMIDE 10 MG: 5 INJECTION, SOLUTION INTRAMUSCULAR; INTRAVENOUS at 10:10

## 2024-09-26 RX ADMIN — ACETAMINOPHEN 1000 MG: 500 TABLET ORAL at 11:57

## 2024-09-26 RX ADMIN — FAMOTIDINE 20 MG: 10 INJECTION INTRAVENOUS at 10:10

## 2024-09-26 RX ADMIN — ACETAMINOPHEN 1000 MG: 500 TABLET ORAL at 06:03

## 2024-09-26 RX ADMIN — FLUTICASONE PROPIONATE 2 SPRAY: 50 SPRAY, METERED NASAL at 11:57

## 2024-09-26 RX ADMIN — LISINOPRIL 10 MG: 10 TABLET ORAL at 10:10

## 2024-09-26 RX ADMIN — CYANOCOBALAMIN 1000 MCG: 1000 INJECTION INTRAMUSCULAR; SUBCUTANEOUS at 10:11

## 2024-09-26 NOTE — PROGRESS NOTES
Continued Stay Note  [facility]     Patient Name: Jessica Mcdowell  MRN: [id number]  Today's Date: 9/26/2024    Admit Date: 9/25/2024    Plan: Home no needs   Discharge Plan       Row Name 09/26/24 0926       Plan    Plan Home no needs    Plan Comments Discharge order noted. Met with patient who confirmed DC plan is to return home. Stated she is independent and uses no DMEs. Stated her family will assist as needed and will provide transportation at DC. Denies any needs/equipment.                   Discharge Codes    No documentation.                 Expected Discharge Date and Time       Expected Discharge Date Expected Discharge Time    Sep 26, 2024               Glenys Nunez RN

## 2024-09-26 NOTE — DISCHARGE SUMMARY
Discharge Summary    Patient name: Jessica Mcdowell    Medical record number: [id number]    Admission date: 9/25/2024  Discharge date:  9/26/2024    Attending physician: Dr. Randal Calix    Primary care physician: Emilia Bull APRN    Referring physician: Randal Calix Jr., MD  [address] [place] [address]    Condition on discharge: Stable    Primary Diagnoses:  Morbid obesity with co-morbidities    Obesity, Class II, BMI 35-39.9 Yes     S/P laparoscopic sleeve gastrectomy      Hypertriglyceridemia      Hypertension, unspecified type      Chronic fatigue      Chronic pain of both knees      Iron deficiency anemia secondary to inadequate dietary iron intake      Mild intermittent asthma without complication      Dietary counseling      Operative Procedure:  Robotic/Laparascopic surgical gastric restrictive procedure with gastric bypass and small intestinal reconstruction to limit absorption.      Jessica Mcdowell  is post op day one status post procedure listed. Patient denies shortness of air and lower extremity pain. Feels better than yesterday. No vomiting this am. Ambulating well and using incentive spirometer.          /73 (BP Location: Right arm, Patient Position: Lying)   Pulse 50   Temp 97.2 °F (36.2 °C) (Oral)   Resp 16   Wt 116 kg (256 lb 6.3 oz)   LMP 01/08/2018 (Exact Date)   SpO2 100%   BMI 39.54 kg/m²     General:  alert, appears stated age, and cooperative   Abdomen: soft, bowel sounds active, appropriate tenderness   Incision:   healing well, no drainage, no erythema, no hernia, no seroma, no swelling, no dehiscence, incision well approximated   Heart: Regular rate   Lungs: Clear to auscultation bilaterally     I reviewed the patient's new clinical results.     Lab Results (last 24 hours)       Procedure Component Value Units Date/Time    Comprehensive Metabolic Panel [[id number]]  (Abnormal) Collected: 09/26/24 0551    Specimen: Blood Updated: 09/26/24  0703     Glucose 109 mg/dL      BUN 13 mg/dL      Creatinine 0.94 mg/dL      Sodium 135 mmol/L      Potassium 4.3 mmol/L      Chloride 103 mmol/L      CO2 23.5 mmol/L      Calcium 9.1 mg/dL      Total Protein 6.8 g/dL      Albumin 3.7 g/dL      ALT (SGPT) 13 U/L      AST (SGOT) 23 U/L      Alkaline Phosphatase 103 U/L      Total Bilirubin 0.6 mg/dL      Globulin 3.1 gm/dL      A/G Ratio 1.2 g/dL      BUN/Creatinine Ratio 13.8     Anion Gap 8.5 mmol/L      eGFR 73.2 mL/min/1.73     Narrative:      GFR Normal >60  Chronic Kidney Disease <60  Kidney Failure <15      Phosphorus [[id number]]  (Normal) Collected: 09/26/24 0551    Specimen: Blood Updated: 09/26/24 0703     Phosphorus 4.5 mg/dL     Magnesium [[id number]]  (Normal) Collected: 09/26/24 0551    Specimen: Blood Updated: 09/26/24 0703     Magnesium 2.4 mg/dL     CBC & Differential [[id number]]  (Abnormal) Collected: 09/26/24 0551    Specimen: Blood Updated: 09/26/24 0640    Narrative:      The following orders were created for panel order CBC & Differential.  Procedure                               Abnormality         Status                     ---------                               -----------         ------                     CBC Auto Differential[[id number]]        Abnormal            Final result                 Please view results for these tests on the individual orders.    CBC Auto Differential [[id number]]  (Abnormal) Collected: 09/26/24 0551    Specimen: Blood Updated: 09/26/24 0640     WBC 5.32 10*3/mm3      RBC 3.90 10*6/mm3      Hemoglobin 9.9 g/dL      Hematocrit 31.1 %      MCV 79.7 fL      MCH 25.4 pg      MCHC 31.8 g/dL      RDW 14.5 %      RDW-SD 41.9 fl      MPV 11.9 fL      Platelets 136 10*3/mm3      Neutrophil % 82.3 %      Lymphocyte % 11.5 %      Monocyte % 5.6 %      Eosinophil % 0.0 %      Basophil % 0.2 %      Immature Grans % 0.4 %      Neutrophils, Absolute 4.38 10*3/mm3      Lymphocytes, Absolute 0.61 10*3/mm3      Monocytes, Absolute  0.30 10*3/mm3      Eosinophils, Absolute 0.00 10*3/mm3      Basophils, Absolute 0.01 10*3/mm3      Immature Grans, Absolute 0.02 10*3/mm3      nRBC 0.0 /100 WBC                Assessment:      Doing well postoperatively.      Plan:   1. Continue Stage 1 diet  2. Continue with ambulation and Incentive spirometry  3. Plan for d/c home    Patient was seen and examined by their surgeon.    Hospital Course: The patient is a very pleasant 54 y.o. female that was admitted to the hospital with morbid obesity with comorbidities who underwent the above mentioned procedure without complication.  Postoperatively the patient was then transferred to the bariatric unit per protocol.  The patient was afebrile with vital signs stable.  They were ambulating well and using the incentive spirometer.  POD 1 the patient was started on bariatric diet which they tolerated without difficulty.  Patient was then discharged home to follow up as an outpatient.      Discharge medications:      Discharge Medications        New Medications        Instructions Start Date   ondansetron ODT 4 MG disintegrating tablet  Commonly known as: ZOFRAN-ODT   4 mg, Translingual, Every 8 Hours PRN      sucralfate 1 g tablet  Commonly known as: Carafate   1 g, Oral, 4 Times Daily      traMADol 50 MG tablet  Commonly known as: ULTRAM   50 mg, Oral, Every 6 Hours PRN             Continue These Medications        Instructions Start Date   albuterol sulfate  (90 Base) MCG/ACT inhaler  Commonly known as: PROVENTIL HFA;VENTOLIN HFA;PROAIR HFA   2 puffs, Inhalation, Every 6 Hours PRN      atorvastatin 40 MG tablet  Commonly known as: LIPITOR   40 mg, Oral, Nightly      Calcium 500-100 MG-UNIT chewable tablet   3 tablets, Oral, Daily, HOLD PRIOR TO SURG      diphenhydrAMINE 25 MG tablet  Commonly known as: Benadryl Allergy   1 tab 6pm 1 tab 11pm the night before procedure 1 tab 6am morning of procedure      Enoxaparin Sodium 40 MG/0.4ML solution prefilled syringe  syringe  Commonly known as: LOVENOX   40 mg, Subcutaneous, Every 24 Hours Scheduled, Start after surgery unless instructed otherwise      ferrous sulfate 140 (45 Fe) MG tablet controlled-release tablet   Oral, Daily With Breakfast, HOLD PRIOR TO SURG      Folbee 2.5-25-1 MG tablet tablet  Generic drug: folic acid-vit B6-vit B12   1 tablet, Oral, Daily      indapamide 2.5 MG tablet  Commonly known as: LOZOL   2.5 mg, Oral, Daily PRN      lidocaine 3 % cream cream   1 dose, Topical, Every 4 Hours PRN      lisinopril 10 MG tablet  Commonly known as: PRINIVIL,ZESTRIL   20 mg, Oral, Daily      loratadine 10 MG tablet  Commonly known as: CLARITIN   10 mg, Oral, Daily PRN      multivitamin with minerals tablet tablet   1 tablet, Oral, Daily, HOLD PRIOR TO SURG      predniSONE 50 MG tablet  Commonly known as: DELTASONE   1 tab 6pm 1 tab 11pm the night before procedure 1 tab 6am morning of procedure      ursodiol 300 MG capsule  Commonly known as: Actigall   300 mg, Oral, 2 Times Daily      vitamin B-12 100 MCG tablet  Commonly known as: CYANOCOBALAMIN   100 mcg, Oral, Daily, HOLD PRIOR TO SURGERY      vitamin C 250 MG tablet  Commonly known as: ASCORBIC ACID   250 mg, Oral, 2 Times Weekly, HOLD PRIOR TO SURG               Discharge instructions:  Per Bariatric manual; per our protocol      Follow-up appointment: Follow up in the office as scheduled.  If not already scheduled call for appointment at [phone].

## 2024-09-26 NOTE — DISCHARGE INSTRUCTIONS
GOING HOME AFTER GASTRIC SLEEVE/ GASTRIC BYPASS SURGERY  [facility] Weight Loss: Post-Operative Information/Instructions  Randal Calix Jr., MD  General Patient Instructions for Discharge   - Call Surgeon's office at [phone] for follow-up appointment.    - Be sure you, the patient, have a follow-up appointment to be seen within seven (7) days after discharge. If not, please call [phone] to schedule an appointment. If you are discharged on a Saturday or Sunday, please call Monday to schedule the appointment.  - Contact the Surgeon at [phone] for any questions or concerns, including temperature greater than or equal to 101F, shortness of breath, leg swelling, redness at incision sites, nausea, vomiting, chills, or problems or questions.    - Follow the Gastric Stage 1 Diet    à Clear liquids, room temperature, sugar-free, caffeine-free, non-carbonated, 70 grams of protein, No Straws.  - You may shower. No tub bath for 2 weeks.  - No lifting, pushing, pulling, or tugging >25 pounds for 3 weeks.  - Ambulate every 3 hours while awake minimum for seven (7) days, increase distance daily.  - For the next several weeks, you are at an increased risk for blood clot formation. Therefore, you should walk regularly. You should not sit for prolonged periods of time, more than 45 minutes, without getting up and walking for 5-10 minutes. This includes any car rides, including the drive home from the hospital. If driving any distance greater than 30 miles over the next two (2) weeks, stop every 30-45 minutes and walk for 5-10 minutes each time.  - Continue using Incentive Spirometer and coughing exercises at least every two (2) hours while awake for one week.  - Continue use of CPAP/BIPAP for diagnosis of sleep apnea as directed.  - No driving or operating machinery allowed while taking narcotic (prescription) pain medication, and until you feel comfortable forcefully applying the brakes if needed. (This  usually takes more than 3 days.)    - Make an appointment with your Primary Care Physician within one week post-op to look at your home medications for possible changes or discontinuity.   Medications  - The nurse will provide a list of medications for you to continue at home   - If you received a Lovenox (Enoxaparin) or Apixiban (Eliquis) prescription at pre-op visit with Surgeon, start taking the medicine the morning after discharge unless directed otherwise.    - If you were prescribed Lovenox (Enoxaparin), review the education/teaching material/video with the nurse.    - Take post op pain meds as prescribed as needed.   - Continue Foltx until finished.   - Resume use of Actigall (Ursodiol) one (1) week after surgery if patient still has gallbladder. You should have been given a prescription at your pre-op visit. Contact the office if you do not have the prescription.   - Resume bariatric vitamin regimen as instructed in pre-op education with bariatric coordinator.    - Zegerid or Prilosec OTC (or generic) by mouth once daily for four (4) weeks unless you are already taking a proton pump inhibitor as home medication. Follow dosing instructions on package.   Nausea/Vomiting:  The following are possible causes for nausea/vomiting:  - Drinking too much or too fast.  - Sinus drainage/post nasal drip for allergy sufferers (you may take Sudafed, Claritin, Tylenol Sinus/Allergy, or other decongestants and nose sprays to help with this discomfort).  - Low blood sugar (sweating, shaky, irritable, weakness, dizzy or tunnel-vision) - treatment is to sip 100% fruit juice - no sugar added until symptoms subside.  - Acid in fruit juice - (may dilute with water or avoid).  - Eating or drinking something that is not on clear liquid (stage 1) diet.  Any nausea/vomiting that prohibits you from keeping fluids down for greater than 24 hours requires a call to the surgeon's office.  Urine:  Use your urine color as a guide to  determine if you are drinking enough fluid. The darker the urine, the more fluids you need to drink. Urine should be clear to light yellow if you are getting enough fluid. If you should experience frequency, burning or pain with urination, blood in urine, contact us or your primary care physician for possible UTI (urinary tract infection), which could require antibiotics (liquid preferred).  Bowel Movements:  You may not have a bowel movement for 2-5 days after going home. You may then experience liquid, runny or loose stools for approximately 3-4 weeks following surgery. This would require you to drink even more fluids to prevent dehydration. Some patients may experience constipation, which can be treated with increased fluids, drinking warm liquids, increased activity and the use of a Fleets Enema, Milk of Magnesia, or suppositories. The first couple of bowel movements could be bloody, tarry black or dark maroon in color. This is OK as long as the stool returns to a normal color in 1-2 days. If however, you have frequent or a large amount of bloody or tarry black stools and/or become light-headed or dizzy, you may be bleeding and require urgent attention. Please call us right away.  Abdominal Incisions:  You will have small incisions. Do not scrub incisions, but allow the warm, soapy water to run over the incisions, rinse well, and pat dry. You may use any brand of anti-bacterial soap. Do not use Peroxide or Neosporin type ointments on sites, unless instructed to do so by a surgeon or nurse. Monitor daily for signs/symptoms of infection, which might include: drainage with a foul odor, pain, redness, swelling or heat at the incision sight; fever, body aches and chills. If you suspect infection or have a fever, give us a call.  Pain:  You will be given a prescription for pain medication to control your pain. If you feel the dose is too strong, you may take half the ordered dose, or you may take Tylenol adult liquid  per package instructions for minor pain. Do not take any medications that contain aspirin or aspirin products.  Do not take medications like: Motrin, Aleve, Ibuprofen, Advil, Naproxen, Celebrex, Daypro, Bextra, Meloxicam or other medications commonly used for arthritis or joint pain.  No steroids or cortisone injections. There may be pain, which should improve every few days. Pain should not suddenly get worse or more intense. Pain that suddenly changes and is constant and severe should be called in to the surgeon's office. Any sudden pain in the lower extremities with associated warmth and redness should be called in to the surgeon's office immediately. Do not rub or massage this area, as it could be a blood clot.  Diet:  Remain on the clear liquid diet (stage 1) per your  which includes 70 grams of protein each day, sugar free, non carbonated and no straws. Day 1 is the day of surgery. If you are tolerating the stage 1 diet, you may then proceed to stage 2 diet, as instructed in the . Do not progress to the stage 2 diet if you are having nausea/vomiting. Refer to the Basic Nutrition and Food Principles guide.  Medications:  The nurse will let you know which medications you will need to continue once you go home. Do not take any medications that are extended or time released if you had the gastric bypass procedure, OK to take if you had the gastric sleeve procedure. Large capsules can be opened and diluted with clear liquids. Check with your physician or pharmacist as to which pills may be crushed and which capsules may be opened and diluted safely. Continue taking Foltx as surgeon orders. If you still have your gall bladder and were prescribed Actigall (Ursodiol), you may resume this medication one week after your surgery. You will remain on Actigall (Ursodiol) for approximately 6 months. The dose is 1 pill, 2 times each day for 6 months.  Activity:  Continue your deep breathing and  coughing exercises with your Incentive Spirometer breathing device at least every 3 hours while awake (10 repetitions each time) for one week. May use CPAP. This will help to prevent respiratory problems such as pneumonia. No lifting, pulling or tugging anything over 25 pounds for 3 weeks after surgery. You may shower but no tub baths, hot tubs or swimming for 2 weeks. Moderate walking is recommended every 3 hours while awake minimum, increase distance daily. Further exercise will be discussed at the first post-op visit. No driving or operating machinery allow until off narcotic pain medication and until you feel comfortable forcefully applying the brakes (usually takes 3 or more days). For the next few weeks you are at an increased risk for blood clot formation. Therefore you should walk regularly and you should not sit for prolonged periods of time, more than 45 minutes without getting up and walking for 5-10 minutes. This includes car rides. Including riding home from the hospital. If riding a distance greater than 30 miles over the next 2 weeks stop every 30-45 minutes and walk 5-10 mintues each time. No tanning bed use for 8 weeks after surgery and in general, not recommended due to the increased risk for skin cancer. Incisions will burn/blister very badly with tanning bed use.  Illness:  Your primary care physician should treat general illness such as ear infections, sinus infections, and viral type illnesses, etc. Medications prescribed should be liquid/elixir form when possible, for the first 30 days.  General:  In general, it is recommended that you weigh yourself no more than once per week. Let the weight come off you and concentrate on more important things. Remember the weight was not gained overnight, nor will it be lost overnight. Gastric Bypass/ Gastric Sleeve weight loss will continue over a period of 12-18 months. Do not  yourself according to how others are doing after surgery, as this will  cause unnecessary discouragement.  THE ABOVE ARE GENERAL GUIDELINES TO ASSIST YOU ONCE HOME, IF YOU ARE IN DOUBT, OR YOU HAVE ANY QUESTIONS, CALL US AT THE NUMBERS LISTED BELOW.  IN THE EVENT OF SUDDEN CHEST PAIN, SHORTNESS OF BREATH, OR ANY LIFE THREATENING CONDITION, CALL 911.  Any time you are evaluated or admitted to another facility, please have someone notify the surgeon's office.  Supplements:  70 grams of protein taken EVERY DAY. Remember to drink at least 64 ounces of fluid a day, sipping slowly early on. Increase this amount during the summertime. Sipping slowly will not stretch your new stomach. Drinking too fast or gulping liquids will cause brief discomfort and early could cause staple line disruption (leak). With eating, tiny bites, then chew, chew, chew, and swallow. Lay your fork/spoon down for 2-3 minutes, and then take your next bite. Your pouch will tell you within 1-2 bites if it is going to tolerate what you are eating.   Protein Vendors:  Refer to protein vendors' handout from consult class. You can always find protein drinks at the bariatric office, grocery stores, [employer], drug [employer], [employer], health food stores, and on the Internet. Find one high in protein (15-30 grams per serving) and low carb (less than 18 grams per serving).  Now is a great time to re-read your . Please review specific instructions given to you at discharge by your physician (surgeon).  HOW/WHEN TO CONTACT US:  It is imperative that you contact us with any of the following:    Ÿ fever greater than 101 degrees  Ÿ shortness of breath  Ÿ leg swelling  Ÿ body aches  Ÿ shaking chills  Ÿ nausea and vomitting  Ÿ pain that has worsened  Ÿ redness at incision sites  Ÿ pus or foul smelling drainage from an incision or wound  Ÿ inability to keep fluids down for more than a day  Ÿ any other condition you feel needs our attention.  [facility] - Bariatric: [phone] call this number anytime 24 hours a  day / 7 days a week.  Teach-back Questions to be answered by the patient prior to discharge.   What complications would prompt you to call your doctor when you return home? _________________    What is the purpose of your prescribed medication? ________________  What are some potential side effects of the medications you will be taking at home? _______________

## 2024-09-26 NOTE — PLAN OF CARE
Goal Outcome Evaluation:  Plan of Care Reviewed With: patient        Progress: improving  Outcome Evaluation: sinus bradycardia, ultram for pain, keep npo with sips of water, voiding freely, encourage to use incentive spirometer, ambulated in the hallway, 6 laps site with glue KISHAN, continue to monitor the pt.

## 2024-09-26 NOTE — NURSING NOTE
HR bradycardic, in the 50's, otherwise VSS. Ambulated in hallway. Lap sites with glue. Messaged APRN about Lovenox, alcohol pads given to pt. Discharged home today.

## 2024-09-27 ENCOUNTER — TRANSITIONAL CARE MANAGEMENT TELEPHONE ENCOUNTER (OUTPATIENT)
Dept: CALL CENTER | Facility: HOSPITAL | Age: 54
End: 2024-09-27
Payer: COMMERCIAL

## 2024-09-27 NOTE — PAYOR COMM NOTE
"Luis Mcdowell (54 y.o. Female)          DC SUMMARY FOR [id number]          Date of Birth   1970    Social Security Number       Address   [address] [place] [address]    Home Phone   [phone]    MRN   [id number]       Orthodoxy   [facility]    Marital Status   Single                            Admission Date   9/25/24    Admission Type   Elective    Admitting Provider   Randal Calix Jr., MD    Attending Provider       Department, Room/Bed   [address], 82/1       Discharge Date   9/26/2024    Discharge Disposition   Home or Self Care    Discharge Destination                                 Attending Provider: (none)   Allergies: Iodine, Shellfish-derived Products    Isolation: None   Infection: None   Code Status: Prior    Ht: 171.5 cm (67.52\")   Wt: 116 kg (256 lb 6.3 oz)    Admission Cmt: None   Principal Problem: Obesity, Class II, BMI 35-39.9 [E66.9]                   Active Insurance as of 9/25/2024       Primary Coverage       Payor Plan Insurance Group Employer/Plan Group    [employer] [facility] [employer] EMPLOYEE [id number]       Payor Plan Address Payor Plan Phone Number Payor Plan Fax Number Effective Dates    [address] [phone]  4/1/2019 - None Entered    [facility] [id number]         Subscriber Name Subscriber Birth Date Member ID       LUIS MCDOWELL 1970 [id number]               Secondary Coverage       Payor Plan Insurance Group Employer/Plan Group    [facility] [employer] BY MANDI [id number]       Payor Plan Address Payor Plan Phone Number Payor Plan Fax Number Effective Dates    [address] [address]   1/1/2021 - None Entered    [facility] [id number]         Subscriber Name Subscriber Birth Date Member ID       LUIS MCDOWELL 1970 [id number]                     Emergency Contacts        (Rel.) Home Phone Work Phone Mobile Phone    Connie Mcdowell (Daughter) [phone] -- " [phone]    Jose De Jesus Mcdowell (Son) [phone] -- --    tyshawn carver (Friend) -- -- [phone]    pavan díaz (Other) [phone] -- --                 Discharge Summary        Caitlin Burroughs APRN at 09/26/24 1518       Attestation signed by Randal Calix Jr., MD at 09/26/24 5153    I agree with above              Discharge Summary    Patient name: Jessica Mcdowell    Medical record number: [id number]    Admission date: 9/25/2024  Discharge date:  9/26/2024    Attending physician: Dr. Randal Calix    Primary care physician: Emilia Bull APRN    Referring physician: Randal Calix Jr., MD  [address] [place] [address]    Condition on discharge: Stable    Primary Diagnoses:  Morbid obesity with co-morbidities    Obesity, Class II, BMI 35-39.9 Yes     S/P laparoscopic sleeve gastrectomy      Hypertriglyceridemia      Hypertension, unspecified type      Chronic fatigue      Chronic pain of both knees      Iron deficiency anemia secondary to inadequate dietary iron intake      Mild intermittent asthma without complication      Dietary counseling      Operative Procedure:  Robotic/Laparascopic surgical gastric restrictive procedure with gastric bypass and small intestinal reconstruction to limit absorption.     Subjective Jessica Mcdowell  is post op day one status post procedure listed. Patient denies shortness of air and lower extremity pain. Feels better than yesterday. No vomiting this am. Ambulating well and using incentive spirometer.      Objective   /73 (BP Location: Right arm, Patient Position: Lying)   Pulse 50   Temp 97.2 °F (36.2 °C) (Oral)   Resp 16   Wt 116 kg (256 lb 6.3 oz)   LMP 01/08/2018 (Exact Date)   SpO2 100%   BMI 39.54 kg/m²     General:  alert, appears stated age, and cooperative   Abdomen: soft, bowel sounds active, appropriate tenderness   Incision:   healing well, no drainage, no erythema, no hernia, no seroma, no  swelling, no dehiscence, incision well approximated   Heart: Regular rate   Lungs: Clear to auscultation bilaterally     I reviewed the patient's new clinical results.     Lab Results (last 24 hours)       Procedure Component Value Units Date/Time    Comprehensive Metabolic Panel [[id number]]  (Abnormal) Collected: 09/26/24 0551    Specimen: Blood Updated: 09/26/24 0703     Glucose 109 mg/dL      BUN 13 mg/dL      Creatinine 0.94 mg/dL      Sodium 135 mmol/L      Potassium 4.3 mmol/L      Chloride 103 mmol/L      CO2 23.5 mmol/L      Calcium 9.1 mg/dL      Total Protein 6.8 g/dL      Albumin 3.7 g/dL      ALT (SGPT) 13 U/L      AST (SGOT) 23 U/L      Alkaline Phosphatase 103 U/L      Total Bilirubin 0.6 mg/dL      Globulin 3.1 gm/dL      A/G Ratio 1.2 g/dL      BUN/Creatinine Ratio 13.8     Anion Gap 8.5 mmol/L      eGFR 73.2 mL/min/1.73     Narrative:      GFR Normal >60  Chronic Kidney Disease <60  Kidney Failure <15      Phosphorus [[id number]]  (Normal) Collected: 09/26/24 0551    Specimen: Blood Updated: 09/26/24 0703     Phosphorus 4.5 mg/dL     Magnesium [[id number]]  (Normal) Collected: 09/26/24 0551    Specimen: Blood Updated: 09/26/24 0703     Magnesium 2.4 mg/dL     CBC & Differential [[id number]]  (Abnormal) Collected: 09/26/24 0551    Specimen: Blood Updated: 09/26/24 0640    Narrative:      The following orders were created for panel order CBC & Differential.  Procedure                               Abnormality         Status                     ---------                               -----------         ------                     CBC Auto Differential[[id number]]        Abnormal            Final result                 Please view results for these tests on the individual orders.    CBC Auto Differential [[id number]]  (Abnormal) Collected: 09/26/24 0551    Specimen: Blood Updated: 09/26/24 0640     WBC 5.32 10*3/mm3      RBC 3.90 10*6/mm3      Hemoglobin 9.9 g/dL      Hematocrit 31.1 %      MCV 79.7 fL       MCH 25.4 pg      MCHC 31.8 g/dL      RDW 14.5 %      RDW-SD 41.9 fl      MPV 11.9 fL      Platelets 136 10*3/mm3      Neutrophil % 82.3 %      Lymphocyte % 11.5 %      Monocyte % 5.6 %      Eosinophil % 0.0 %      Basophil % 0.2 %      Immature Grans % 0.4 %      Neutrophils, Absolute 4.38 10*3/mm3      Lymphocytes, Absolute 0.61 10*3/mm3      Monocytes, Absolute 0.30 10*3/mm3      Eosinophils, Absolute 0.00 10*3/mm3      Basophils, Absolute 0.01 10*3/mm3      Immature Grans, Absolute 0.02 10*3/mm3      nRBC 0.0 /100 WBC               Assessment:     Assessment Doing well postoperatively.     Plan:   1. Continue Stage 1 diet  2. Continue with ambulation and Incentive spirometry  3. Plan for d/c home    Patient was seen and examined by their surgeon.    Hospital Course: The patient is a very pleasant 54 y.o. female that was admitted to the hospital with morbid obesity with comorbidities who underwent the above mentioned procedure without complication.  Postoperatively the patient was then transferred to the bariatric unit per protocol.  The patient was afebrile with vital signs stable.  They were ambulating well and using the incentive spirometer.  POD 1 the patient was started on bariatric diet which they tolerated without difficulty.  Patient was then discharged home to follow up as an outpatient.      Discharge medications:      Discharge Medications        New Medications        Instructions Start Date   ondansetron ODT 4 MG disintegrating tablet  Commonly known as: ZOFRAN-ODT   4 mg, Translingual, Every 8 Hours PRN      sucralfate 1 g tablet  Commonly known as: Carafate   1 g, Oral, 4 Times Daily      traMADol 50 MG tablet  Commonly known as: ULTRAM   50 mg, Oral, Every 6 Hours PRN             Continue These Medications        Instructions Start Date   albuterol sulfate  (90 Base) MCG/ACT inhaler  Commonly known as: PROVENTIL HFA;VENTOLIN HFA;PROAIR HFA   2 puffs, Inhalation, Every 6 Hours PRN       atorvastatin 40 MG tablet  Commonly known as: LIPITOR   40 mg, Oral, Nightly      Calcium 500-100 MG-UNIT chewable tablet   3 tablets, Oral, Daily, HOLD PRIOR TO SURG      diphenhydrAMINE 25 MG tablet  Commonly known as: Benadryl Allergy   1 tab 6pm 1 tab 11pm the night before procedure 1 tab 6am morning of procedure      Enoxaparin Sodium 40 MG/0.4ML solution prefilled syringe syringe  Commonly known as: LOVENOX   40 mg, Subcutaneous, Every 24 Hours Scheduled, Start after surgery unless instructed otherwise      ferrous sulfate 140 (45 Fe) MG tablet controlled-release tablet   Oral, Daily With Breakfast, HOLD PRIOR TO SURG      Folbee 2.5-25-1 MG tablet tablet  Generic drug: folic acid-vit B6-vit B12   1 tablet, Oral, Daily      indapamide 2.5 MG tablet  Commonly known as: LOZOL   2.5 mg, Oral, Daily PRN      lidocaine 3 % cream cream   1 dose, Topical, Every 4 Hours PRN      lisinopril 10 MG tablet  Commonly known as: PRINIVIL,ZESTRIL   20 mg, Oral, Daily      loratadine 10 MG tablet  Commonly known as: CLARITIN   10 mg, Oral, Daily PRN      multivitamin with minerals tablet tablet   1 tablet, Oral, Daily, HOLD PRIOR TO SURG      predniSONE 50 MG tablet  Commonly known as: DELTASONE   1 tab 6pm 1 tab 11pm the night before procedure 1 tab 6am morning of procedure      ursodiol 300 MG capsule  Commonly known as: Actigall   300 mg, Oral, 2 Times Daily      vitamin B-12 100 MCG tablet  Commonly known as: CYANOCOBALAMIN   100 mcg, Oral, Daily, HOLD PRIOR TO SURGERY      vitamin C 250 MG tablet  Commonly known as: ASCORBIC ACID   250 mg, Oral, 2 Times Weekly, HOLD PRIOR TO SURG               Discharge instructions:  Per Bariatric manual; per our protocol      Follow-up appointment: Follow up in the office as scheduled.  If not already scheduled call for appointment at [phone].     Electronically signed by Randal Calix Jr., MD at 09/26/24 1318

## 2024-09-27 NOTE — PROGRESS NOTES
Case Management Discharge Note      Final Note: Discharged home. Glenys Nunez RN         Selected Continued Care - Discharged on 9/26/2024 Admission date: 9/25/2024 - Discharge disposition: Home or Self Care       Transportation Services  Private: Car    Final Discharge Disposition Code: 01 - home or self-care

## 2024-09-28 ENCOUNTER — TRANSITIONAL CARE MANAGEMENT TELEPHONE ENCOUNTER (OUTPATIENT)
Dept: CALL CENTER | Facility: HOSPITAL | Age: 54
End: 2024-09-28
Payer: COMMERCIAL

## 2024-10-01 ENCOUNTER — OFFICE VISIT (OUTPATIENT)
Dept: BARIATRICS/WEIGHT MGMT | Facility: CLINIC | Age: 54
End: 2024-10-01
Payer: COMMERCIAL

## 2024-10-01 VITALS
HEART RATE: 71 BPM | DIASTOLIC BLOOD PRESSURE: 86 MMHG | WEIGHT: 249 LBS | SYSTOLIC BLOOD PRESSURE: 141 MMHG | BODY MASS INDEX: 37.74 KG/M2 | HEIGHT: 68 IN | TEMPERATURE: 98.1 F

## 2024-10-01 DIAGNOSIS — Z98.84 S/P GASTRIC BYPASS: ICD-10-CM

## 2024-10-01 DIAGNOSIS — Z71.3 DIETARY COUNSELING: ICD-10-CM

## 2024-10-01 DIAGNOSIS — E66.812 OBESITY, CLASS II, BMI 35-39.9: Primary | ICD-10-CM

## 2024-10-01 PROCEDURE — 99024 POSTOP FOLLOW-UP VISIT: CPT | Performed by: NURSE PRACTITIONER

## 2024-10-01 NOTE — PROGRESS NOTES
[facility] BARIATRIC SURGERY  [address]  [facility] [id number] [phone]  [address]  [facility] [id number] [phone]  Dept: [phone]  10/1/2024      Jessica Mcdowell.  [id number]  [id number]  1970  female      Chief Complaint   Patient presents with    Post-op     6 days PO DEBORAH PETTY Post-Op Bariatric Surgery:   Jessica Mcdowell is status post Robotic/Laparascopic surgical gastric restrictive procedure with gastric bypass and small intestinal reconstruction to limit absorption procedure, performed on 9/25/2024.     HPI:   Today's weight is 113 kg (249 lb) pounds, today's BMI is Body mass index is 38.4 kg/m²., has a  loss of 4 pounds since surgery. The patient reports a decreased portion size and loss of appetite.  Jessica Mcdowell denies nausea, vomiting, reflux, dysphagia and reports tolerating clear liquids. The patient c/o appropriate post-op incisional discomfort that is improving. They are doing well with protein and water intake so far. Taking their vitamins, walking and using IS. Denies fevers, chills, chest pain or shortness of air.      C/o RLQ pain which just started yesterday.  Feels like gets bloated from time to time.  Walks and gets better.  The pain in rlq won't stop.  Not passing much gas.  Has had 1 bm since surgery.  Hasn't taken any medication.  Tolerating liquids ok.  Drinking protein shakes.  Drinking at least 1 per day.  Tolerating broth and is drinking daily since hospital.  Working same gatorade as had last week.  Can't drink much water.  Had 2 20 oz bottles yesterday.  Tolerating medication well.      Diet and Exercise: Diet history reviewed and discussed with the patient. Weight loss/gains to date discussed with the patient. No carbonated beverage consumption and exercising regularly- walking frequently.   Supplements: multivitamins, B-12, calcium, iron, B-1 and Vitamin D.   Patient is  taking blood thinner as prescribed: Lovenox  Current outpatient and discharge medications have been reconciled for the patient.  Reviewed by: AARTI Mora    Review of Systems   Constitutional:  Positive for activity change, appetite change and fatigue.   Respiratory:  Negative for shortness of breath.    Cardiovascular:  Negative for chest pain.   Gastrointestinal:  Positive for abdominal distention and abdominal pain.   All other systems reviewed and are negative.      Patient Active Problem List   Diagnosis    Iron deficiency anemia secondary to inadequate dietary iron intake    HTN (hypertension)    Seasonal allergies    Unintended weight gain    Chronic fatigue    Heart murmur    Asthma    Chronic pain of both knees    Rheumatoid arthritis    Gout    Prediabetes    History of heart attack    Localized edema    Dietary counseling    Left leg swelling    Obesity, Class II, BMI 35-39.9    S/P laparoscopic gastric restrictive procedure with gastric bypass and small intestinal reconstruction to limit absorption    Hypertriglyceridemia    Elevated serum creatinine    Elevated serum alkaline phosphatase level    Vitamin D deficiency    Bilateral lower extremity edema    Abnormal results of cardiovascular function studies       The following portions of the patient's history were reviewed and updated as appropriate: allergies, current medications, past medical history, past surgical history, and problem list.    Vitals:    10/01/24 1016   BP: 141/86   Pulse: 71   Temp: 98.1 °F (36.7 °C)       Physical Exam  Vitals reviewed.   Constitutional:       General: She is awake. She is not in acute distress.     Appearance: She is morbidly obese.   HENT:      Head: Normocephalic and atraumatic.      Mouth/Throat:      Mouth: Mucous membranes are moist.   Eyes:      General: No scleral icterus.     Extraocular Movements: Extraocular movements intact.      Conjunctiva/sclera: Conjunctivae normal.      Pupils: Pupils are  equal, round, and reactive to light.   Cardiovascular:      Rate and Rhythm: Normal rate and regular rhythm.   Pulmonary:      Effort: Pulmonary effort is normal. No respiratory distress.   Abdominal:      General: Abdomen is flat. Bowel sounds are normal. There is distension.      Palpations: Abdomen is soft.      Tenderness: There is no abdominal tenderness. There is no guarding.      Comments: Incisions clean, dry, intact; no erythema  Abdomen slightly distended; soft   Musculoskeletal:         General: Normal range of motion.      Cervical back: Normal range of motion and neck supple.   Skin:     General: Skin is warm and dry.   Neurological:      General: No focal deficit present.      Mental Status: She is alert and oriented to person, place, and time.   Psychiatric:         Mood and Affect: Mood normal.         Behavior: Behavior normal. Behavior is cooperative.         Thought Content: Thought content normal.         Judgment: Judgment normal.         Assessment:   Post-op, the patient is doing well.     Encounter Diagnoses   Name Primary?    Obesity, Class II, BMI 35-39.9 Yes    S/P laparoscopic gastric restrictive procedure with gastric bypass and small intestinal reconstruction to limit absorption     Dietary counseling        Plan:   Believe pain due to constipation.  Encouraged to try miralax to have bm.  If no improvement, call or send mc message.  Reviewed with patient the importance of following the manual for diet progression. Increase activity as tolerated. Continue increasing daily intake of protein and water.   Return to work: the patient is to return to 3 weeks from their surgery date with no restrictions unless they develop medical problems in which we will see them back in the office. They received a note in our office today with their return to work date.  Activity restrictions: no lifting, pushing or pulling over 25lbs for 3 weeks.   Recommended patient be sure to get at least 70 grams of  protein per day. Discussed with the patient the recommended amount of water per day to intake. Reviewed vitamin requirements. Be sure to do routine exercise and increase activity as tolerated. No asa, nsaids or steroids for 8 weeks if gastric sleeve procedure and lifelong if gastric bypass procedure.. Patient may use miralax as needed if necessary.     Instructions / Recommendations: dietary counseling recommended, recommended a daily protein intake of  grams, vitamin supplement(s) recommended, recommended exercising at least 150 minutes per week, behavior modifications recommended and instructed to call the office for concerns, questions, or problems.     The patient was instructed to follow up at one month follow up appt.     The patient was counseled regarding post op bariatric manual

## 2024-10-01 NOTE — PATIENT INSTRUCTIONS
You could try milk of magnesia, miralax, doculax for bm today.      How to treat Constipation:    Make sure you are drinking plenty of water.  Your daily goal should be 64 ounces of water.  Other substitutes for water can be powerade, gatorade, decaf tea, flavored water that have minimal if any sugar content.   If your stool is hard, you can add Colace (ducosate sodium), which is a stool softener, daily.  You can start by taking 1-2 per day to see if this helps.   If you're getting at least 64 oz of water, you could add a fiber supplement such as Metamucil (psyllium) or Benefiber daily.  Start slowly and work up to 10-15 grams of fiber daily.    Sometimes adding a probiotic can be helpful.  Look for one that has 5 billion cultures.    Miralax can also be very helpful.  It is not a stimulant laxative so is safe to take frequently if you need to.   Limit your use of stimulant laxatives as these can become habit forming to your colon.  Using them once in a while is okay.

## 2024-10-02 ENCOUNTER — TELEPHONE (OUTPATIENT)
Dept: FAMILY MEDICINE CLINIC | Facility: CLINIC | Age: 54
End: 2024-10-02
Payer: COMMERCIAL

## 2024-10-02 NOTE — TELEPHONE ENCOUNTER
LVM for pt informing them we have the flu vaccination and to call if she would like to be scheduled.

## 2024-10-29 ENCOUNTER — OFFICE VISIT (OUTPATIENT)
Dept: BARIATRICS/WEIGHT MGMT | Facility: CLINIC | Age: 54
End: 2024-10-29
Payer: COMMERCIAL

## 2024-10-29 VITALS
TEMPERATURE: 98.4 F | BODY MASS INDEX: 35.61 KG/M2 | DIASTOLIC BLOOD PRESSURE: 90 MMHG | SYSTOLIC BLOOD PRESSURE: 139 MMHG | HEIGHT: 68 IN | HEART RATE: 82 BPM | WEIGHT: 235 LBS

## 2024-10-29 DIAGNOSIS — R73.03 PREDIABETES: ICD-10-CM

## 2024-10-29 DIAGNOSIS — R53.82 CHRONIC FATIGUE: ICD-10-CM

## 2024-10-29 DIAGNOSIS — E66.812 OBESITY, CLASS II, BMI 35-39.9: Primary | ICD-10-CM

## 2024-10-29 DIAGNOSIS — Z98.84 S/P GASTRIC BYPASS: ICD-10-CM

## 2024-10-29 DIAGNOSIS — E55.9 VITAMIN D DEFICIENCY: ICD-10-CM

## 2024-10-29 DIAGNOSIS — I10 PRIMARY HYPERTENSION: ICD-10-CM

## 2024-10-29 PROCEDURE — 99024 POSTOP FOLLOW-UP VISIT: CPT | Performed by: NURSE PRACTITIONER

## 2024-10-29 RX ORDER — SYRINGE W-NEEDLE,DISPOSAB,3 ML 25GX5/8"
6 SYRINGE, EMPTY DISPOSABLE MISCELLANEOUS
Qty: 6 EACH | Refills: 2 | Status: SHIPPED | OUTPATIENT
Start: 2024-10-29

## 2024-10-29 RX ORDER — ENOXAPARIN SODIUM 100 MG/ML
40 INJECTION SUBCUTANEOUS
COMMUNITY
Start: 2024-10-18 | End: 2024-10-29

## 2024-10-29 RX ORDER — CYANOCOBALAMIN 1000 UG/ML
1000 INJECTION, SOLUTION INTRAMUSCULAR; SUBCUTANEOUS
Qty: 1 ML | Refills: 2 | Status: SHIPPED | OUTPATIENT
Start: 2024-10-29 | End: 2025-03-29

## 2024-10-29 NOTE — PROGRESS NOTES
[facility] BARIATRIC SURGERY  [address]  [facility] [id number] [phone]  [address]  [facility] [id number] [phone]  Dept: [phone]  10/29/2024      Jessica Mcdowell.  [id number]  [id number]  1970  female      Chief Complaint   Patient presents with    Post-op     1 mo po DEBORAH PETTY Post-Op Bariatric Surgery:   Jessica Mcdowell is status post laparopscopic OAGB procedure, performed on 9/25/24.     HPI:   Today's weight is   pounds, today's BMI is There is no height or weight on file to calculate BMI.,  she  has a  loss of 14 pounds since the last visit and  her  weight loss since surgery is 18 pounds. The patient reports a decreased portion size and loss of appetite.  Jessica Mcdowell denies nausea, vomiting, reflux and reports doing well with getting her fluids and protein in.     She does report noting splotches on her upper thighs that were itchy after eating elgin seeds.    She is getting in 1-2 protein shakes per day.      Diet and Exercise: Diet history reviewed and discussed with the patient. Weight loss/gains to date discussed with the patient. No carbonated beverage consumption and exercising regularly- walking frequently.   She reports tolerating small portions of soft foods well and supplementing her protein intake with shakes. She reports having regular bowel movements and tolerating her vitamins well.  Supplements: multivitamins, B-12, calcium, iron, B-1 and Vitamin D.       Review of Systems   Constitutional:  Positive for appetite change. Negative for fatigue and unexpected weight change.   HENT: Negative.     Eyes: Negative.    Respiratory: Negative.     Cardiovascular: Negative.  Negative for leg swelling.   Gastrointestinal:  Negative for abdominal distention, abdominal pain, constipation, diarrhea, nausea and vomiting.   Genitourinary:  Negative for difficulty urinating, frequency and urgency.    Musculoskeletal:  Negative for back pain.   Skin: Negative.    Psychiatric/Behavioral: Negative.     All other systems reviewed and are negative.      Patient Active Problem List   Diagnosis    Iron deficiency anemia secondary to inadequate dietary iron intake    HTN (hypertension)    Seasonal allergies    Unintended weight gain    Chronic fatigue    Heart murmur    Asthma    Chronic pain of both knees    Rheumatoid arthritis    Gout    Prediabetes    History of heart attack    Localized edema    Dietary counseling    Left leg swelling    Obesity, Class II, BMI 35-39.9    S/P laparoscopic gastric restrictive procedure with gastric bypass and small intestinal reconstruction to limit absorption    Hypertriglyceridemia    Elevated serum creatinine    Elevated serum alkaline phosphatase level    Vitamin D deficiency    Bilateral lower extremity edema    Abnormal results of cardiovascular function studies       The following portions of the patient's history were reviewed and updated as appropriate: allergies, current medications, past family history, past medical history, past social history, past surgical history, and problem list.    There were no vitals filed for this visit.    Physical Exam  Vitals and nursing note reviewed.   Constitutional:       Appearance: She is well-developed.   Neck:      Thyroid: No thyromegaly.   Cardiovascular:      Rate and Rhythm: Normal rate.   Pulmonary:      Effort: Pulmonary effort is normal. No respiratory distress.   Abdominal:      Palpations: Abdomen is soft.   Musculoskeletal:         General: No tenderness.   Skin:     General: Skin is warm and dry.   Neurological:      Mental Status: She is alert.   Psychiatric:         Behavior: Behavior normal.         Assessment:   Post-op, the patient is feeling well other than some skin inflammation on upper thighs and swelling on her fingers intermittently that she thinks is secondary to an allergic reaction. She is only getting in 1-2  protein shakes daily, maybe eating one small portion outside of this is and likely falling short on goal protein intake.     Encounter Diagnoses   Name Primary?    Obesity, Class II, BMI 35-39.9 Yes    S/P laparoscopic gastric restrictive procedure with gastric bypass and small intestinal reconstruction to limit absorption     Prediabetes     Primary hypertension     Vitamin D deficiency     Chronic fatigue        Plan:   Increase meal frequency and protein intake. Start b12 injections monthly, start taking MTV regularly. Will trend vitamin levels and prealbumin today.  Reviewed with patient the importance of following the manual for diet progression. Increase activity as tolerated. Continue increasing daily intake of protein and water.   Return to work: the patient is to return to 3 weeks from their surgery date with no restrictions unless they develop medical problems in which we will see them back in the office. They received a note in our office today with their return to work date.  Activity restrictions: no lifting, pushing or pulling over 25lbs for 3 weeks.   Recommended patient be sure to get at least 70 grams of protein per day. Discussed with the patient the recommended amount of water per day to intake. Reviewed vitamin requirements. Be sure to do routine exercise and increase activity as tolerated. No asa, nsaids or steroids for 8 weeks if gastric sleeve procedure and lifelong if gastric bypass procedure.. Patient may use miralax as needed if necessary.     Instructions / Recommendations: dietary counseling recommended, recommended a daily protein intake of  grams, vitamin supplement(s) recommended, recommended exercising at least 150 minutes per week, behavior modifications recommended and instructed to call the office for concerns, questions, or problems.     The patient was instructed to follow up at one month follow up appt.     The patient was counseled regarding post op bariatric manual

## 2024-11-13 ENCOUNTER — TELEPHONE (OUTPATIENT)
Dept: BARIATRICS/WEIGHT MGMT | Facility: CLINIC | Age: 54
End: 2024-11-13
Payer: COMMERCIAL

## 2024-11-19 ENCOUNTER — APPOINTMENT (OUTPATIENT)
Dept: CT IMAGING | Facility: HOSPITAL | Age: 54
End: 2024-11-19
Payer: COMMERCIAL

## 2024-11-19 ENCOUNTER — HOSPITAL ENCOUNTER (OUTPATIENT)
Facility: HOSPITAL | Age: 54
Setting detail: OBSERVATION
Discharge: HOME | End: 2024-11-20
Attending: EMERGENCY MEDICINE | Admitting: STUDENT IN AN ORGANIZED HEALTH CARE EDUCATION/TRAINING PROGRAM
Payer: COMMERCIAL

## 2024-11-19 DIAGNOSIS — R10.11 ABDOMINAL PAIN, ACUTE, RIGHT UPPER QUADRANT: Primary | ICD-10-CM

## 2024-11-19 DIAGNOSIS — E78.2 MIXED HYPERLIPIDEMIA: ICD-10-CM

## 2024-11-19 DIAGNOSIS — I10 PRIMARY HYPERTENSION: Chronic | ICD-10-CM

## 2024-11-19 DIAGNOSIS — R93.5 ABNORMAL CT OF THE ABDOMEN: ICD-10-CM

## 2024-11-19 LAB
ALBUMIN SERPL-MCNC: 4.2 G/DL (ref 3.5–5.2)
ALBUMIN/GLOB SERPL: 1.4 G/DL
ALP SERPL-CCNC: 118 U/L (ref 39–117)
ALT SERPL W P-5'-P-CCNC: 17 U/L (ref 1–33)
ANION GAP SERPL CALCULATED.3IONS-SCNC: 6 MMOL/L (ref 5–15)
AST SERPL-CCNC: 25 U/L (ref 1–32)
BACTERIA UR QL AUTO: ABNORMAL /HPF
BASOPHILS # BLD AUTO: 0.01 10*3/MM3 (ref 0–0.2)
BASOPHILS NFR BLD AUTO: 0.3 % (ref 0–1.5)
BILIRUB SERPL-MCNC: 0.4 MG/DL (ref 0–1.2)
BILIRUB UR QL STRIP: NEGATIVE
BUN SERPL-MCNC: 15 MG/DL (ref 6–20)
BUN/CREAT SERPL: 19.5 (ref 7–25)
CALCIUM SPEC-SCNC: 9.7 MG/DL (ref 8.6–10.5)
CHLORIDE SERPL-SCNC: 106 MMOL/L (ref 98–107)
CLARITY UR: CLEAR
CO2 SERPL-SCNC: 30 MMOL/L (ref 22–29)
COLOR UR: YELLOW
CREAT SERPL-MCNC: 0.77 MG/DL (ref 0.57–1)
D-LACTATE SERPL-SCNC: 0.8 MMOL/L (ref 0.5–2)
DEPRECATED RDW RBC AUTO: 41.9 FL (ref 37–54)
EGFRCR SERPLBLD CKD-EPI 2021: 92.9 ML/MIN/1.73
EOSINOPHIL # BLD AUTO: 0.14 10*3/MM3 (ref 0–0.4)
EOSINOPHIL NFR BLD AUTO: 3.7 % (ref 0.3–6.2)
ERYTHROCYTE [DISTWIDTH] IN BLOOD BY AUTOMATED COUNT: 15.2 % (ref 12.3–15.4)
GLOBULIN UR ELPH-MCNC: 3.1 GM/DL
GLUCOSE SERPL-MCNC: 93 MG/DL (ref 65–99)
GLUCOSE UR STRIP-MCNC: NEGATIVE MG/DL
HCT VFR BLD AUTO: 32.8 % (ref 34–46.6)
HGB BLD-MCNC: 10.6 G/DL (ref 12–15.9)
HGB UR QL STRIP.AUTO: ABNORMAL
HOLD SPECIMEN: NORMAL
HOLD SPECIMEN: NORMAL
HYALINE CASTS UR QL AUTO: ABNORMAL /LPF
IMM GRANULOCYTES # BLD AUTO: 0.01 10*3/MM3 (ref 0–0.05)
IMM GRANULOCYTES NFR BLD AUTO: 0.3 % (ref 0–0.5)
KETONES UR QL STRIP: ABNORMAL
LEUKOCYTE ESTERASE UR QL STRIP.AUTO: NEGATIVE
LIPASE SERPL-CCNC: 49 U/L (ref 13–60)
LYMPHOCYTES # BLD AUTO: 0.76 10*3/MM3 (ref 0.7–3.1)
LYMPHOCYTES NFR BLD AUTO: 20.3 % (ref 19.6–45.3)
MCH RBC QN AUTO: 25.1 PG (ref 26.6–33)
MCHC RBC AUTO-ENTMCNC: 32.3 G/DL (ref 31.5–35.7)
MCV RBC AUTO: 77.5 FL (ref 79–97)
MONOCYTES # BLD AUTO: 0.32 10*3/MM3 (ref 0.1–0.9)
MONOCYTES NFR BLD AUTO: 8.6 % (ref 5–12)
NEUTROPHILS NFR BLD AUTO: 2.5 10*3/MM3 (ref 1.7–7)
NEUTROPHILS NFR BLD AUTO: 66.8 % (ref 42.7–76)
NITRITE UR QL STRIP: NEGATIVE
PH UR STRIP.AUTO: <=5 [PH] (ref 5–8)
PLATELET # BLD AUTO: 145 10*3/MM3 (ref 140–450)
PMV BLD AUTO: 11.1 FL (ref 6–12)
POTASSIUM SERPL-SCNC: 3.8 MMOL/L (ref 3.5–5.2)
PROT SERPL-MCNC: 7.3 G/DL (ref 6–8.5)
PROT UR QL STRIP: ABNORMAL
RBC # BLD AUTO: 4.23 10*6/MM3 (ref 3.77–5.28)
RBC # UR STRIP: ABNORMAL /HPF
REF LAB TEST METHOD: ABNORMAL
SODIUM SERPL-SCNC: 142 MMOL/L (ref 136–145)
SP GR UR STRIP: 1.03 (ref 1–1.03)
SQUAMOUS #/AREA URNS HPF: ABNORMAL /HPF
UROBILINOGEN UR QL STRIP: ABNORMAL
WBC # UR STRIP: ABNORMAL /HPF
WBC NRBC COR # BLD AUTO: 3.74 10*3/MM3 (ref 3.4–10.8)
WHOLE BLOOD HOLD COAG: NORMAL
WHOLE BLOOD HOLD SPECIMEN: NORMAL

## 2024-11-19 PROCEDURE — 83690 ASSAY OF LIPASE: CPT | Performed by: EMERGENCY MEDICINE

## 2024-11-19 PROCEDURE — 36415 COLL VENOUS BLD VENIPUNCTURE: CPT

## 2024-11-19 PROCEDURE — 99285 EMERGENCY DEPT VISIT HI MDM: CPT

## 2024-11-19 PROCEDURE — 25010000002 METHYLPREDNISOLONE PER 40 MG: Performed by: EMERGENCY MEDICINE

## 2024-11-19 PROCEDURE — G0378 HOSPITAL OBSERVATION PER HR: HCPCS

## 2024-11-19 PROCEDURE — 83605 ASSAY OF LACTIC ACID: CPT | Performed by: EMERGENCY MEDICINE

## 2024-11-19 PROCEDURE — 74176 CT ABD & PELVIS W/O CONTRAST: CPT

## 2024-11-19 PROCEDURE — 80053 COMPREHEN METABOLIC PANEL: CPT | Performed by: EMERGENCY MEDICINE

## 2024-11-19 PROCEDURE — 96375 TX/PRO/DX INJ NEW DRUG ADDON: CPT

## 2024-11-19 PROCEDURE — 85025 COMPLETE CBC W/AUTO DIFF WBC: CPT | Performed by: EMERGENCY MEDICINE

## 2024-11-19 PROCEDURE — 25010000002 KETOROLAC TROMETHAMINE PER 15 MG: Performed by: EMERGENCY MEDICINE

## 2024-11-19 PROCEDURE — 81001 URINALYSIS AUTO W/SCOPE: CPT | Performed by: EMERGENCY MEDICINE

## 2024-11-19 PROCEDURE — 96374 THER/PROPH/DIAG INJ IV PUSH: CPT

## 2024-11-19 PROCEDURE — 25010000002 DIPHENHYDRAMINE PER 50 MG: Performed by: EMERGENCY MEDICINE

## 2024-11-19 RX ORDER — ATORVASTATIN CALCIUM 40 MG/1
40 TABLET, FILM COATED ORAL NIGHTLY
Qty: 90 TABLET | Refills: 1 | Status: SHIPPED | OUTPATIENT
Start: 2024-11-19

## 2024-11-19 RX ORDER — SODIUM CHLORIDE 0.9 % (FLUSH) 0.9 %
10 SYRINGE (ML) INJECTION AS NEEDED
Status: DISCONTINUED | OUTPATIENT
Start: 2024-11-19 | End: 2024-11-20 | Stop reason: HOSPADM

## 2024-11-19 RX ORDER — METHYLPREDNISOLONE SODIUM SUCCINATE 40 MG/ML
40 INJECTION, POWDER, LYOPHILIZED, FOR SOLUTION INTRAMUSCULAR; INTRAVENOUS ONCE
Status: COMPLETED | OUTPATIENT
Start: 2024-11-19 | End: 2024-11-19

## 2024-11-19 RX ORDER — KETOROLAC TROMETHAMINE 15 MG/ML
15 INJECTION, SOLUTION INTRAMUSCULAR; INTRAVENOUS ONCE
Status: COMPLETED | OUTPATIENT
Start: 2024-11-19 | End: 2024-11-19

## 2024-11-19 RX ORDER — DIPHENHYDRAMINE HYDROCHLORIDE 50 MG/ML
25 INJECTION INTRAMUSCULAR; INTRAVENOUS ONCE
Status: COMPLETED | OUTPATIENT
Start: 2024-11-19 | End: 2024-11-19

## 2024-11-19 RX ADMIN — KETOROLAC TROMETHAMINE 15 MG: 15 INJECTION, SOLUTION INTRAMUSCULAR; INTRAVENOUS at 20:18

## 2024-11-19 RX ADMIN — DIPHENHYDRAMINE HYDROCHLORIDE 25 MG: 50 INJECTION, SOLUTION INTRAMUSCULAR; INTRAVENOUS at 22:52

## 2024-11-19 RX ADMIN — METHYLPREDNISOLONE SODIUM SUCCINATE 40 MG: 40 INJECTION, POWDER, FOR SOLUTION INTRAMUSCULAR; INTRAVENOUS at 22:51

## 2024-11-20 ENCOUNTER — APPOINTMENT (OUTPATIENT)
Dept: CT IMAGING | Facility: HOSPITAL | Age: 54
End: 2024-11-20
Payer: COMMERCIAL

## 2024-11-20 ENCOUNTER — READMISSION MANAGEMENT (OUTPATIENT)
Dept: CALL CENTER | Facility: HOSPITAL | Age: 54
End: 2024-11-20
Payer: COMMERCIAL

## 2024-11-20 VITALS
HEART RATE: 65 BPM | DIASTOLIC BLOOD PRESSURE: 78 MMHG | TEMPERATURE: 97.9 F | OXYGEN SATURATION: 98 % | RESPIRATION RATE: 16 BRPM | SYSTOLIC BLOOD PRESSURE: 131 MMHG | BODY MASS INDEX: 35.75 KG/M2 | WEIGHT: 235.89 LBS | HEIGHT: 68 IN

## 2024-11-20 DIAGNOSIS — R59.1 LYMPHADENOPATHY: Primary | ICD-10-CM

## 2024-11-20 LAB
ANION GAP SERPL CALCULATED.3IONS-SCNC: 8 MMOL/L (ref 5–15)
BUN SERPL-MCNC: 13 MG/DL (ref 6–20)
BUN/CREAT SERPL: 17.8 (ref 7–25)
CALCIUM SPEC-SCNC: 9.4 MG/DL (ref 8.6–10.5)
CHLORIDE SERPL-SCNC: 105 MMOL/L (ref 98–107)
CO2 SERPL-SCNC: 26 MMOL/L (ref 22–29)
CREAT SERPL-MCNC: 0.73 MG/DL (ref 0.57–1)
DEPRECATED RDW RBC AUTO: 41.3 FL (ref 37–54)
EGFRCR SERPLBLD CKD-EPI 2021: 99.1 ML/MIN/1.73
ERYTHROCYTE [DISTWIDTH] IN BLOOD BY AUTOMATED COUNT: 14.8 % (ref 12.3–15.4)
FERRITIN SERPL-MCNC: 269 NG/ML (ref 13–150)
GLUCOSE SERPL-MCNC: 128 MG/DL (ref 65–99)
HCT VFR BLD AUTO: 32.7 % (ref 34–46.6)
HGB BLD-MCNC: 10.3 G/DL (ref 12–15.9)
IRON 24H UR-MRATE: 35 MCG/DL (ref 37–145)
IRON SATN MFR SERPL: 12 % (ref 20–50)
LDH SERPL-CCNC: 344 U/L (ref 135–214)
MCH RBC QN AUTO: 24.5 PG (ref 26.6–33)
MCHC RBC AUTO-ENTMCNC: 31.5 G/DL (ref 31.5–35.7)
MCV RBC AUTO: 77.7 FL (ref 79–97)
PLATELET # BLD AUTO: 135 10*3/MM3 (ref 140–450)
PMV BLD AUTO: 11.7 FL (ref 6–12)
POTASSIUM SERPL-SCNC: 3.9 MMOL/L (ref 3.5–5.2)
RBC # BLD AUTO: 4.21 10*6/MM3 (ref 3.77–5.28)
SODIUM SERPL-SCNC: 139 MMOL/L (ref 136–145)
TIBC SERPL-MCNC: 302 MCG/DL (ref 298–536)
TRANSFERRIN SERPL-MCNC: 203 MG/DL (ref 200–360)
URATE SERPL-MCNC: 7.4 MG/DL (ref 2.4–5.7)
WBC NRBC COR # BLD AUTO: 4.38 10*3/MM3 (ref 3.4–10.8)

## 2024-11-20 PROCEDURE — 74177 CT ABD & PELVIS W/CONTRAST: CPT

## 2024-11-20 PROCEDURE — 83540 ASSAY OF IRON: CPT | Performed by: INTERNAL MEDICINE

## 2024-11-20 PROCEDURE — G0378 HOSPITAL OBSERVATION PER HR: HCPCS

## 2024-11-20 PROCEDURE — 83615 LACTATE (LD) (LDH) ENZYME: CPT | Performed by: INTERNAL MEDICINE

## 2024-11-20 PROCEDURE — 84550 ASSAY OF BLOOD/URIC ACID: CPT | Performed by: INTERNAL MEDICINE

## 2024-11-20 PROCEDURE — 96376 TX/PRO/DX INJ SAME DRUG ADON: CPT

## 2024-11-20 PROCEDURE — 99205 OFFICE O/P NEW HI 60 MIN: CPT | Performed by: INTERNAL MEDICINE

## 2024-11-20 PROCEDURE — 84466 ASSAY OF TRANSFERRIN: CPT | Performed by: INTERNAL MEDICINE

## 2024-11-20 PROCEDURE — 25010000002 DIPHENHYDRAMINE PER 50 MG

## 2024-11-20 PROCEDURE — 25510000001 IOPAMIDOL 61 % SOLUTION: Performed by: STUDENT IN AN ORGANIZED HEALTH CARE EDUCATION/TRAINING PROGRAM

## 2024-11-20 PROCEDURE — 25010000002 METHYLPREDNISOLONE PER 40 MG

## 2024-11-20 PROCEDURE — 82728 ASSAY OF FERRITIN: CPT | Performed by: INTERNAL MEDICINE

## 2024-11-20 PROCEDURE — 80048 BASIC METABOLIC PNL TOTAL CA: CPT

## 2024-11-20 PROCEDURE — 85027 COMPLETE CBC AUTOMATED: CPT

## 2024-11-20 RX ORDER — BISACODYL 10 MG
10 SUPPOSITORY, RECTAL RECTAL DAILY PRN
Status: DISCONTINUED | OUTPATIENT
Start: 2024-11-20 | End: 2024-11-20 | Stop reason: HOSPADM

## 2024-11-20 RX ORDER — FERROUS SULFATE 325(65) MG
325 TABLET ORAL
Status: DISCONTINUED | OUTPATIENT
Start: 2024-11-21 | End: 2024-11-20 | Stop reason: HOSPADM

## 2024-11-20 RX ORDER — ONDANSETRON 2 MG/ML
4 INJECTION INTRAMUSCULAR; INTRAVENOUS EVERY 6 HOURS PRN
Status: DISCONTINUED | OUTPATIENT
Start: 2024-11-20 | End: 2024-11-20 | Stop reason: HOSPADM

## 2024-11-20 RX ORDER — ASCORBIC ACID 500 MG
250 TABLET ORAL 2 TIMES WEEKLY
Status: DISCONTINUED | OUTPATIENT
Start: 2024-11-21 | End: 2024-11-20 | Stop reason: HOSPADM

## 2024-11-20 RX ORDER — AMOXICILLIN 250 MG
2 CAPSULE ORAL 2 TIMES DAILY PRN
Status: DISCONTINUED | OUTPATIENT
Start: 2024-11-20 | End: 2024-11-20 | Stop reason: HOSPADM

## 2024-11-20 RX ORDER — ONDANSETRON 4 MG/1
4 TABLET, ORALLY DISINTEGRATING ORAL EVERY 6 HOURS PRN
Status: DISCONTINUED | OUTPATIENT
Start: 2024-11-20 | End: 2024-11-20 | Stop reason: HOSPADM

## 2024-11-20 RX ORDER — IOPAMIDOL 612 MG/ML
100 INJECTION, SOLUTION INTRAVASCULAR
Status: COMPLETED | OUTPATIENT
Start: 2024-11-20 | End: 2024-11-20

## 2024-11-20 RX ORDER — ALBUTEROL SULFATE 90 UG/1
2 INHALANT RESPIRATORY (INHALATION) EVERY 4 HOURS PRN
Status: DISCONTINUED | OUTPATIENT
Start: 2024-11-20 | End: 2024-11-20 | Stop reason: CLARIF

## 2024-11-20 RX ORDER — LISINOPRIL 10 MG/1
10 TABLET ORAL DAILY
Start: 2024-11-20

## 2024-11-20 RX ORDER — ACETAMINOPHEN 325 MG/1
650 TABLET ORAL EVERY 4 HOURS PRN
Status: DISCONTINUED | OUTPATIENT
Start: 2024-11-20 | End: 2024-11-20 | Stop reason: HOSPADM

## 2024-11-20 RX ORDER — METHYLPREDNISOLONE SODIUM SUCCINATE 40 MG/ML
40 INJECTION, POWDER, LYOPHILIZED, FOR SOLUTION INTRAMUSCULAR; INTRAVENOUS EVERY 4 HOURS
Status: DISCONTINUED | OUTPATIENT
Start: 2024-11-20 | End: 2024-11-20

## 2024-11-20 RX ORDER — LISINOPRIL 10 MG/1
10 TABLET ORAL DAILY
Status: DISCONTINUED | OUTPATIENT
Start: 2024-11-21 | End: 2024-11-20 | Stop reason: HOSPADM

## 2024-11-20 RX ORDER — DIPHENHYDRAMINE HYDROCHLORIDE 50 MG/ML
50 INJECTION INTRAMUSCULAR; INTRAVENOUS
Status: DISCONTINUED | OUTPATIENT
Start: 2024-11-20 | End: 2024-11-20

## 2024-11-20 RX ORDER — MULTIPLE VITAMINS W/ MINERALS TAB 9MG-400MCG
1 TAB ORAL DAILY
Status: DISCONTINUED | OUTPATIENT
Start: 2024-11-20 | End: 2024-11-20 | Stop reason: HOSPADM

## 2024-11-20 RX ORDER — FOLIC ACID/VIT B COMPLEX AND C 0.8 MG
1 TABLET ORAL DAILY
Status: DISCONTINUED | OUTPATIENT
Start: 2024-11-20 | End: 2024-11-20 | Stop reason: HOSPADM

## 2024-11-20 RX ORDER — SUCRALFATE 1 G/1
1 TABLET ORAL 4 TIMES DAILY
Status: DISCONTINUED | OUTPATIENT
Start: 2024-11-20 | End: 2024-11-20 | Stop reason: HOSPADM

## 2024-11-20 RX ORDER — SUCRALFATE 1 G/1
1 TABLET ORAL 4 TIMES DAILY
COMMUNITY

## 2024-11-20 RX ORDER — INDAPAMIDE 2.5 MG/1
2.5 TABLET ORAL DAILY PRN
Status: DISCONTINUED | OUTPATIENT
Start: 2024-11-20 | End: 2024-11-20 | Stop reason: HOSPADM

## 2024-11-20 RX ORDER — URSODIOL 300 MG/1
300 CAPSULE ORAL 2 TIMES DAILY
Status: DISCONTINUED | OUTPATIENT
Start: 2024-11-20 | End: 2024-11-20 | Stop reason: HOSPADM

## 2024-11-20 RX ORDER — POLYETHYLENE GLYCOL 3350 17 G/17G
17 POWDER, FOR SOLUTION ORAL DAILY PRN
Status: DISCONTINUED | OUTPATIENT
Start: 2024-11-20 | End: 2024-11-20 | Stop reason: HOSPADM

## 2024-11-20 RX ORDER — BISACODYL 5 MG/1
5 TABLET, DELAYED RELEASE ORAL DAILY PRN
Status: DISCONTINUED | OUTPATIENT
Start: 2024-11-20 | End: 2024-11-20 | Stop reason: HOSPADM

## 2024-11-20 RX ORDER — ALBUTEROL SULFATE 0.83 MG/ML
2.5 SOLUTION RESPIRATORY (INHALATION) EVERY 4 HOURS PRN
Status: DISCONTINUED | OUTPATIENT
Start: 2024-11-20 | End: 2024-11-20 | Stop reason: HOSPADM

## 2024-11-20 RX ORDER — FOLIC ACID/VIT BCOMP,C/CU/ZINC 5-1.5-25MG
1 TABLET ORAL DAILY
COMMUNITY

## 2024-11-20 RX ORDER — DIPHENHYDRAMINE HYDROCHLORIDE 50 MG/ML
50 INJECTION INTRAMUSCULAR; INTRAVENOUS ONCE
Status: COMPLETED | OUTPATIENT
Start: 2024-11-20 | End: 2024-11-20

## 2024-11-20 RX ADMIN — IOPAMIDOL 85 ML: 612 INJECTION, SOLUTION INTRAVENOUS at 10:30

## 2024-11-20 RX ADMIN — DIPHENHYDRAMINE HYDROCHLORIDE 50 MG: 50 INJECTION, SOLUTION INTRAMUSCULAR; INTRAVENOUS at 09:34

## 2024-11-20 RX ADMIN — METHYLPREDNISOLONE SODIUM SUCCINATE 40 MG: 40 INJECTION, POWDER, FOR SOLUTION INTRAMUSCULAR; INTRAVENOUS at 03:07

## 2024-11-20 RX ADMIN — METHYLPREDNISOLONE SODIUM SUCCINATE 40 MG: 40 INJECTION, POWDER, FOR SOLUTION INTRAMUSCULAR; INTRAVENOUS at 06:56

## 2024-11-20 NOTE — ED PROVIDER NOTES
EMERGENCY DEPARTMENT ENCOUNTER    Room Number:  09/09  PCP: Emilia Bull APRN  Historian: Patient    I initially evaluated the patient at 2001    HPI:  Chief Complaint: Right-sided abdominal pain  A complete HPI/ROS/PMH/PSH/SH/FH are unobtainable due to: Nothing  Context: Jessica Mcdowell is a 54 y.o. female with a medical history of gastric bypass surgery, asthma, hypertension, kidney stones, IBS who presents to the ED c/o acute right sided abdominal pain.  Pain woke her up around 2 AM this morning and has been constant since.  Pain is dull and aching.  Pain is worse with movement.  Pain is not related to eating.  She denies fever, chills, nausea, vomiting, diarrhea, constipation, hematuria, or dysuria.  She had a gastric bypass surgery 2 months ago.  She has had a partial hysterectomy and a hernia repair.            PAST MEDICAL HISTORY  Active Ambulatory Problems     Diagnosis Date Noted    Iron deficiency anemia secondary to inadequate dietary iron intake 05/22/2012    HTN (hypertension) 12/29/2020    Seasonal allergies 02/16/2022    Unintended weight gain 02/16/2022    Chronic fatigue 02/16/2022    Heart murmur 02/16/2022    Asthma 02/16/2022    Chronic pain of both knees 02/16/2022    Rheumatoid arthritis 02/16/2022    Gout 02/16/2022    Prediabetes 02/16/2022    History of heart attack 02/16/2022    Localized edema 02/16/2022    Dietary counseling 02/16/2022    Left leg swelling 04/17/2022    Obesity, Class II, BMI 35-39.9 08/10/2022    S/P laparoscopic gastric restrictive procedure with gastric bypass and small intestinal reconstruction to limit absorption 08/10/2022    Hypertriglyceridemia 11/04/2023    Elevated serum creatinine 04/02/2024    Elevated serum alkaline phosphatase level 04/02/2024    Vitamin D deficiency 04/02/2024    Bilateral lower extremity edema 07/04/2024    Abnormal results of cardiovascular function studies 08/02/2024     Resolved Ambulatory Problems     Diagnosis Date Noted     Abnormal uterine bleeding 01/15/2018    IBS (irritable bowel syndrome) 2022    Hemorrhoids 2022    History of kidney stones 2022    Obesity, Class III, BMI 40-49.9 (morbid obesity) 2022    Preoperative testing 2022    Hiatal hernia 2022    Constipation 2022    Fluid retention 2022    Gastroesophageal reflux disease 2024    History of hemorrhoids 2024    Viral URI with cough 2024    History of vitamin D deficiency 2024    Gross hematuria 2024    Bloody stool 2024     Past Medical History:   Diagnosis Date    Abnormal EKG     Eczema     History of anemia     History of gout     History of tachycardia     Hyperlipidemia     Hypertension     Kidney stone     Leiomyoma     Murmur, heart     PONV (postoperative nausea and vomiting)          PAST SURGICAL HISTORY  Past Surgical History:   Procedure Laterality Date    ABDOMINOPLASTY      CARDIAC CATHETERIZATION      CARDIAC CATHETERIZATION Left 2024    Procedure: Cardiac Catheterization/Vascular Study;  Surgeon: Cristian Saleh MD;  Location:  [facility] CATH INVASIVE LOCATION;  Service: Cardiovascular;  Laterality: Left;    CARDIAC CATHETERIZATION N/A 2024    Procedure: Left Heart Cath;  Surgeon: Cristian Saleh MD;  Location: [facility] CATH [facility];  Service: Cardiovascular;  Laterality: N/A;    CARDIAC CATHETERIZATION N/A 2024    Procedure: Coronary angiography;  Surgeon: Cristian Saleh MD;  Location:  [facility] CATH INVASIVE LOCATION;  Service: Cardiovascular;  Laterality: N/A;    CARDIAC CATHETERIZATION N/A 2024    Procedure: Left ventriculography;  Surgeon: Cristian Saleh MD;  Location:  [facility] CATH INVASIVE LOCATION;  Service: Cardiovascular;  Laterality: N/A;     SECTION      x2    COLONOSCOPY      X 2    ENDOSCOPY N/A 2024    Procedure: ESOPHAGOGASTRODUODENOSCOPY WITH BIOPSY;  Surgeon: Randal Calix Jr., MD;   Location: [facility] ENDOSCOPY;  Service: General;  Laterality: N/A;  PRE- DYSPEPSIA, H/LO SLEEVE  POST-GASTRITIS    GASTRIC BYPASS N/A 9/25/2024    Procedure: Robotic/Laparoscopic gastric restrictive procedure with gastric bypass and small intestine reconstruction to limit absorpion, LYSIS OF ADHESIONS;  Surgeon: Randal Calix Jr., MD;  Location:  [facility];  Service: Robotics - DaVinci;  Laterality: N/A;    GASTRIC SLEEVE LAPAROSCOPIC N/A 08/03/2022    Procedure: GASTRIC SLEEVE LAPAROSCOPIC OR ROBOTIC, HIATAL HERNIA REPAIR;  Surgeon: Randal Calix Jr., MD;  Location:  [facility];  Service: Robotics - DaVinci;  Laterality: N/A;    HEMORRHOIDECTOMY      HERNIA REPAIR      HYSTERECTOMY SUPRACERVICAL Bilateral 01/15/2018    Procedure: LAPAROSCOPIC SUPRACERVICAL HYSTERECTOMY WITH DAVINCI ROBOT BILATERAL SALPINGECTOMY;  Surgeon: Brigitte Roldan MD;  Location: [facility] MAIN OR;  Service:     LIPOMA EXCISION      back    WISDOM TOOTH EXTRACTION      THREE         FAMILY HISTORY  Family History   Adopted: Yes   Problem Relation Age of Onset    Diabetes Father     Other (hypoglycemic) Father     Malig Hyperthermia Neg Hx          SOCIAL HISTORY  Social History     Socioeconomic History    Marital status: Single   Tobacco Use    Smoking status: Never     Passive exposure: Never    Smokeless tobacco: Never   Vaping Use    Vaping status: Never Used   Substance and Sexual Activity    Alcohol use: Not Currently    Drug use: Never    Sexual activity: Defer     Birth control/protection: Condom, Surgical, Hysterectomy         ALLERGIES  Iodine and Shellfish-derived products    REVIEW OF SYSTEMS  Review of Systems  Included in HPI  All systems reviewed and negative except for those discussed in HPI.      PHYSICAL EXAM  ED Triage Vitals   Temp Heart Rate Resp BP SpO2   11/19/24 1933 11/19/24 1933 11/19/24 1933 11/19/24 1938 11/19/24 1933   97.9 °F (36.6 °C) 96 18 146/90 96 %      Temp src Heart Rate Source Patient Position  BP Location FiO2 (%)   11/19/24 1933 -- 11/19/24 1938 11/19/24 1938 --   Tympanic  Sitting Right arm        Physical Exam      GENERAL: Awake, alert, oriented x 3.  Well-developed, well-nourished female.  Resting comfortably in no acute distress.  BMI 35.7  HENT: NCAT, nares patent  EYES: no scleral icterus  CV: regular rhythm, normal rate  RESPIRATORY: normal effort, clear to auscultation bilaterally  ABDOMEN: soft, nondistended, there is right upper quadrant and mid right abdominal tenderness without rebound or guarding, no CVA tenderness  MUSCULOSKELETAL: Extremities are nontender with full range of motion  NEURO: Speech is normal.  No facial droop.  PSYCH:  calm, cooperative  SKIN: warm, dry    Vital signs and nursing notes reviewed.          LAB RESULTS  Recent Results (from the past 24 hours)   Comprehensive Metabolic Panel    Collection Time: 11/19/24  7:47 PM    Specimen: Arm, Left; Blood   Result Value Ref Range    Glucose 93 65 - 99 mg/dL    BUN 15 6 - 20 mg/dL    Creatinine 0.77 0.57 - 1.00 mg/dL    Sodium 142 136 - 145 mmol/L    Potassium 3.8 3.5 - 5.2 mmol/L    Chloride 106 98 - 107 mmol/L    CO2 30.0 (H) 22.0 - 29.0 mmol/L    Calcium 9.7 8.6 - 10.5 mg/dL    Total Protein 7.3 6.0 - 8.5 g/dL    Albumin 4.2 3.5 - 5.2 g/dL    ALT (SGPT) 17 1 - 33 U/L    AST (SGOT) 25 1 - 32 U/L    Alkaline Phosphatase 118 (H) 39 - 117 U/L    Total Bilirubin 0.4 0.0 - 1.2 mg/dL    Globulin 3.1 gm/dL    A/G Ratio 1.4 g/dL    BUN/Creatinine Ratio 19.5 7.0 - 25.0    Anion Gap 6.0 5.0 - 15.0 mmol/L    eGFR 92.9 >60.0 mL/min/1.73   Lipase    Collection Time: 11/19/24  7:47 PM    Specimen: Arm, Left; Blood   Result Value Ref Range    Lipase 49 13 - 60 U/L   Lactic Acid, Plasma    Collection Time: 11/19/24  7:47 PM    Specimen: Arm, Left; Blood   Result Value Ref Range    Lactate 0.8 0.5 - 2.0 mmol/L   Green Top (Gel)    Collection Time: 11/19/24  7:47 PM   Result Value Ref Range    Extra Tube Hold for add-ons.    Lavender Top     Collection Time: 11/19/24  7:47 PM   Result Value Ref Range    Extra Tube hold for add-on    Gold Top - SST    Collection Time: 11/19/24  7:47 PM   Result Value Ref Range    Extra Tube Hold for add-ons.    Light Blue Top    Collection Time: 11/19/24  7:47 PM   Result Value Ref Range    Extra Tube Hold for add-ons.    CBC Auto Differential    Collection Time: 11/19/24  7:47 PM    Specimen: Arm, Left; Blood   Result Value Ref Range    WBC 3.74 3.40 - 10.80 10*3/mm3    RBC 4.23 3.77 - 5.28 10*6/mm3    Hemoglobin 10.6 (L) 12.0 - 15.9 g/dL    Hematocrit 32.8 (L) 34.0 - 46.6 %    MCV 77.5 (L) 79.0 - 97.0 fL    MCH 25.1 (L) 26.6 - 33.0 pg    MCHC 32.3 31.5 - 35.7 g/dL    RDW 15.2 12.3 - 15.4 %    RDW-SD 41.9 37.0 - 54.0 fl    MPV 11.1 6.0 - 12.0 fL    Platelets 145 140 - 450 10*3/mm3    Neutrophil % 66.8 42.7 - 76.0 %    Lymphocyte % 20.3 19.6 - 45.3 %    Monocyte % 8.6 5.0 - 12.0 %    Eosinophil % 3.7 0.3 - 6.2 %    Basophil % 0.3 0.0 - 1.5 %    Immature Grans % 0.3 0.0 - 0.5 %    Neutrophils, Absolute 2.50 1.70 - 7.00 10*3/mm3    Lymphocytes, Absolute 0.76 0.70 - 3.10 10*3/mm3    Monocytes, Absolute 0.32 0.10 - 0.90 10*3/mm3    Eosinophils, Absolute 0.14 0.00 - 0.40 10*3/mm3    Basophils, Absolute 0.01 0.00 - 0.20 10*3/mm3    Immature Grans, Absolute 0.01 0.00 - 0.05 10*3/mm3   Urinalysis With Microscopic If Indicated (No Culture) - Urine, Clean Catch    Collection Time: 11/19/24  7:49 PM    Specimen: Urine, Clean Catch   Result Value Ref Range    Color, UA Yellow Yellow, Straw    Appearance, UA Clear Clear    pH, UA <=5.0 5.0 - 8.0    Specific Gravity, UA 1.027 1.005 - 1.030    Glucose, UA Negative Negative    Ketones, UA Trace (A) Negative    Bilirubin, UA Negative Negative    Blood, UA Moderate (2+) (A) Negative    Protein, UA 30 mg/dL (1+) (A) Negative    Leuk Esterase, UA Negative Negative    Nitrite, UA Negative Negative    Urobilinogen, UA 0.2 E.U./dL 0.2 - 1.0 E.U./dL   Urinalysis, Microscopic Only - Urine, Clean  Catch    Collection Time: 11/19/24  7:49 PM    Specimen: Urine, Clean Catch   Result Value Ref Range    RBC, UA 6-10 (A) None Seen, 0-2 /HPF    WBC, UA 0-2 None Seen, 0-2 /HPF    Bacteria, UA None Seen None Seen /HPF    Squamous Epithelial Cells, UA 0-2 None Seen, 0-2 /HPF    Hyaline Casts, UA 0-2 None Seen /LPF    Methodology Automated Microscopy        Ordered the above labs and reviewed the results.        RADIOLOGY  CT Abdomen Pelvis Without Contrast    Result Date: 11/19/2024  CT ABDOMEN AND PELVIS WITHOUT IV CONTRAST  HISTORY: Right upper quadrant and right mid abdominal pain  TECHNIQUE: Radiation dose reduction techniques were utilized, including automated exposure control and exposure modulation based on body size. Axial images were obtained through the abdomen and pelvis without the administration of IV contrast. Coronal and sagittal reformatted images were obtained.  COMPARISON: None available  FINDINGS:  ABDOMEN: There is mild atelectasis or scarring in the lung bases. There are some mildly enlarged lymph nodes in the right epicardial fat pad with index node measuring about 9 mm in greatest short axis dimension. The liver is unremarkable. The gallbladder is unremarkable. Spleen is normal in size. Kidneys are unremarkable. The adrenal glands are unremarkable. Pancreas appears unremarkable. Postop changes of gastric bypass surgery. There appear to be multiple enlarged retroperitoneal lymph nodes. The largest of these measures roughly 1.7 cm in greatest short axis dimension. There also appears to be multiple enlarged mesenteric lymph nodes with a suspected cristofer mass in the mid abdomen measuring about 4.8 cm. There is some ill-defined stranding and edema in the central mesentery.  PELVIS: Bladder is unremarkable. Trace free fluid in the pelvis. The colon appears unremarkable. Appendix is normal. Prominent inguinal lymph nodes are demonstrated. There are some enlarged iliac chain lymph nodes with the largest  on the right measuring about 2.4 cm and on the left measuring about 1.3 cm in greatest short axis dimension. No acute bony abnormality      1. Multiple enlarged lymph nodes in the retroperitoneum, mesentery, and in the pelvis. There also appears to be a suspected large cristofer mass in the mid abdomen. Overall findings are concerning for lymphoproliferative disorder such as lymphoma. Would recommend a CT scan of the abdomen pelvis with IV contrast to better delineate the lymphadenopathy. 2. Additional findings as described    Radiation dose reduction techniques were utilized, including automated exposure control and exposure modulation based on body size.   This report was finalized on 11/19/2024 9:24 PM by Dr. Ronak Zuniga M.D on Workstation: [id number]       Ordered the above noted radiological studies. Reviewed by me in PACS.            PROCEDURES  Procedures        OUTPATIENT MEDICATION MANAGEMENT:  Current Facility-Administered Medications Ordered in Epic   Medication Dose Route Frequency Provider Last Rate Last Admin    sodium chloride 0.9 % flush 10 mL  10 mL Intravenous PRN Tiago Ryan MD        sodium chloride 0.9 % flush 10 mL  10 mL Intravenous PRN Tiago Ryan MD         Current Outpatient Medications Ordered in Epic   Medication Sig Dispense Refill    albuterol sulfate  (90 Base) MCG/ACT inhaler INHALE 2 PUFFS BY MOUTH EVERY 6 HOURS AS NEEDED 8.5 g 3    atorvastatin (LIPITOR) 40 MG tablet TAKE 1 TABLET BY MOUTH EVERY NIGHT 90 tablet 1    Calcium 500-100 MG-UNIT chewable tablet Chew 3 tablets Daily. HOLD PRIOR TO SURG      cyanocobalamin 1000 MCG/ML injection Inject 1 mL into the appropriate muscle as directed by prescriber Every 30 (Thirty) Days for 6 doses. 1 mL 2    diphenhydrAMINE (Benadryl Allergy) 25 MG tablet 1 tab 6pm 1 tab 11pm the night before procedure 1 tab 6am morning of procedure 3 tablet 0    ferrous sulfate 140 (45 Fe) MG tablet controlled-release tablet Take  by  "mouth Daily With Breakfast. HOLD PRIOR TO SURG      indapamide (LOZOL) 2.5 MG tablet Take 1 tablet by mouth Daily As Needed (swelling). 30 tablet 6    lidocaine 3 % cream cream Apply 1 dose topically to the appropriate area as directed Every 4 (Four) Hours As Needed.      lisinopril (PRINIVIL,ZESTRIL) 10 MG tablet Take 2 tablets by mouth Daily. (Patient taking differently: Take 1 tablet by mouth Daily.)      loratadine (CLARITIN) 10 MG tablet Take 1 tablet by mouth Daily As Needed.      multivitamin with minerals tablet tablet Take 1 tablet by mouth Daily. HOLD PRIOR TO SURG      ondansetron ODT (ZOFRAN-ODT) 4 MG disintegrating tablet Place 1 tablet on the tongue Every 8 (Eight) Hours As Needed for Nausea. 20 tablet 0    Syringe 22G X 1\" 3 ML misc Use 6 each Every 30 (Thirty) Days. 6 each 2    ursodiol (Actigall) 300 MG capsule Take 1 capsule by mouth 2 (Two) Times a Day. 60 capsule 5    vitamin C (ASCORBIC ACID) 250 MG tablet Take 1 tablet by mouth 2 (Two) Times a Week. HOLD PRIOR TO SURG             MEDICATIONS GIVEN IN ER  Medications   sodium chloride 0.9 % flush 10 mL (has no administration in time range)   sodium chloride 0.9 % flush 10 mL (has no administration in time range)   ketorolac (TORADOL) injection 15 mg (15 mg Intravenous Given 11/19/24 2018)   diphenhydrAMINE (BENADRYL) injection 25 mg (25 mg Intravenous Given 11/19/24 2252)   methylPREDNISolone sodium succinate (SOLU-Medrol) injection 40 mg (40 mg Intravenous Given 11/19/24 2251)                   MEDICAL DECISION MAKING, PROGRESS, and CONSULTS    All labs have been independently reviewed by me.  All radiology studies have been reviewed by me and I have also reviewed the radiology report.   EKG's independently viewed and interpreted by me.  Discussion below represents my analysis of pertinent findings related to patient's condition, differential diagnosis, treatment plan and final disposition.      Additional sources:    - Discussed/ obtained " information from independent historians: None    - External (non-ED) record review: Patient had a robotic/laparoscopic gastric bypass in September 2024.  This was done by Dr. Calix.  She followed up with her bariatric surgeon last month.    -Prescription drug monitoring program review:     N/A    - Chronic or social conditions impacting patient care (Social Determinants of Health): None          Orders placed during this visit:  Orders Placed This Encounter   Procedures    CT Abdomen Pelvis Without Contrast    [place] Draw    Comprehensive Metabolic Panel    Lipase    Urinalysis With Microscopic If Indicated (No Culture) - Urine, Clean Catch    Lactic Acid, Plasma    CBC Auto Differential    Urinalysis, Microscopic Only - Urine, Clean Catch    NPO Diet NPO Type: Strict NPO    Undress & Gown    [facility] (on-call MD unless specified) Details    Insert Peripheral IV    Insert Peripheral IV    Initiate Observation Status    CBC & Differential    Green Top (Gel)    Lavender Top    Gold Top - SST    Light Blue Top         Additional orders considered but not ordered:          Differential diagnosis includes, but is not limited to:    Differential diagnosis includes but is not limited to:  - hepatobiliary pathology such as cholecystitis, cholangitis, and symptomatic cholelithiasis  - Pancreatitis  - Dyspepsia  - Small bowel obstruction  - Appendicitis  - Diverticulitis  - UTI including pyelonephritis  - Ureteral stone  - Zoster  - Colitis, including infectious and ischemic  - Atypical ACS        Independent interpretation of labs, radiology studies, and discussions with consultants:  ED Course as of 11/19/24 2305 Tue Nov 19, 2024 2001 BP: 146/90 [WH]   2001 Temp: 97.9 °F (36.6 °C) [WH]   2001 Heart Rate: 96 []   2001 Resp: 18 []   2001 SpO2: 96 % []   2053 Leukocytes, UA: Negative []   2053 Nitrite, UA: Negative []   2053 RBC, UA(!): 6-10 []   2053 WBC, UA: 0-2 []   2053 Bacteria, UA: None Seen []   2054  Lipase: 49 [WH]   2054 Glucose: 93 [WH]   2054 Creatinine: 0.77 [WH]   2054 ALT (SGPT): 17 [WH]   2054 AST (SGOT): 25 [WH]   2054 Alkaline Phosphatase(!): 118 [WH]   2054 Total Bilirubin: 0.4 [WH]   2054 WBC: 3.74 [WH]   2054 Hemoglobin(!): 10.6 [WH]   2134 CT abdomen/pelvis personally interpreted by me.  My personal interpretation is: Lung bases are clear.  No gallstones.  No obstructive uropathy    Per the radiologist, there are multiple enlarged lymph nodes in the retroperitoneum, mesentery, and pelvis.  There is a suspected large cristofer mass in the mid abdomen.  Findings are concerning for lymphoproliferative disorder.  Contrasted CT is recommended for further evaluation. [WH]   2153 Test results and diagnosis were discussed with the patient.  She believes she has had CT contrast before but is not completely sure.  She is allergic to shellfish and iodine.  Review of the EMR does not show any previous contrasted CTs.  Shared decision making was discussed and admission was recommended for further evaluation.  Patient will require premedication before getting CT contrast.  Alternatively, she may need an MRI.  Patient is asking for a few minutes to think things over for making a decision. [WH]   2214 Patient is agreeable to being admitted.  Call will be placed to the hospitalist. [WH]   2301 Case discussed with SILVIA Neal and she agrees to admit the patient to Dr. Pierce.  Pertinent history, exam findings, test results, ED course, and diagnoses were discussed with her [WH]   2304 MDM: Patient presented the ED complaining of acute right upper quadrant/right mid abdominal pain.  On exam, she did not have an acute abdomen.  Labs were unremarkable.  CT scan showed multiple enlarged lymph nodes as well as a large cristofer mass in the mid abdomen.  Findings were concerning for lymphoproliferative disorder.  Patient will need a contrasted CT.  She has an allergy to iodine and shellfish.  There are no records of her getting IV  contrast in the EMR.  She will be given her first dose of Benadryl and Solu-Medrol in the ED for premedication.  Case was discussed with the hospitalist and the patient will be admitted [WH]      ED Course User Index  [WH] Tiago Ryan MD         COMPLEXITY OF CARE  The patient requires admission.      DIAGNOSIS  Final diagnoses:   Abdominal pain, acute, right upper quadrant   Abnormal CT of the abdomen         DISPOSITION  ADMISSION    Discussed treatment plan and reason for admission with pt/family and admitting physician.  Pt/family voiced understanding of the plan for admission for further testing/treatment as needed.               Latest Documented Vital Signs:  AS OF 23:05 EST VITALS:    BP - 152/97  HR - 65  TEMP - 97.9 °F (36.6 °C) (Tympanic)  O2 SATS - 97%            --    Please note that portions of this were completed with a voice recognition program.       Note Disclaimer: At [facility], we believe that sharing information builds trust and better relationships. You are receiving this note because you are receiving care at [facility] or recently visited. It is possible you will see health information before a provider has talked with you about it. This kind of information can be easy to misunderstand. To help you fully understand what it means for your health, we urge you to discuss this note with your provider.             Tiago Ryan MD  11/19/24 0426

## 2024-11-20 NOTE — H&P
Patient Name:  Jessica Mcdowell  YOB: 1970  MRN:  [id number]  Admit Date:  11/19/2024  Patient Care Team:  Emilia Bull APRN as PCP - General (Nurse Practitioner)      Subjective   History Present Illness     Chief Complaint   Patient presents with    Abdominal Pain       Ms. Mcdowell is a 54 y.o. female with HTN, history of gastric bypass surgery presenting with abdominal pain.  Patient had pain starting yesterday mainly on the right side of her abdomen, it was so severe it actually woke her up from sleep.  Pain has lessened in intensity since that time and she actually does not complain of any presently.  She had sleeve gastrectomy in September of this year.  States that she has been intentionally losing weight since that time.  Otherwise she has not had any fevers, chills, night sweats.  Has been in usual state of health.  Tolerating diet and no early satiety.    Review of Systems   Constitutional:  Negative for chills and fever.   Cardiovascular:  Negative for chest pain.   Gastrointestinal:  Positive for abdominal pain. Negative for diarrhea, nausea and vomiting.   Genitourinary:  Negative for dysuria and urgency.   Neurological:  Negative for syncope and weakness.        Personal History     Past Medical History:   Diagnosis Date    Abnormal EKG     CLEARED BY CARDIOLOGY    Asthma     Eczema     Hemorrhoids 02/16/2022    History of anemia     History of gout     History of hemorrhoids     History of kidney stones 02/16/2022    History of tachycardia     Hyperlipidemia     Hypertension     IBS (irritable bowel syndrome) 02/16/2022    Kidney stone     history    Leiomyoma     Murmur, heart     PONV (postoperative nausea and vomiting)     Rheumatoid arthritis 02/16/2022     Past Surgical History:   Procedure Laterality Date    ABDOMINOPLASTY      CARDIAC CATHETERIZATION      CARDIAC CATHETERIZATION Left 08/06/2024    Procedure: Cardiac Catheterization/Vascular Study;  Surgeon:  Cristian Saleh MD;  Location: [facility] CATH INVASIVE LOCATION;  Service: Cardiovascular;  Laterality: Left;    CARDIAC CATHETERIZATION N/A 2024    Procedure: Left Heart Cath;  Surgeon: Cristian Saleh MD;  Location: [facility] CATH INVASIVE LOCATION;  Service: Cardiovascular;  Laterality: N/A;    CARDIAC CATHETERIZATION N/A 2024    Procedure: Coronary angiography;  Surgeon: Cristian Saleh MD;  Location: [facility] CATH INVASIVE LOCATION;  Service: Cardiovascular;  Laterality: N/A;    CARDIAC CATHETERIZATION N/A 2024    Procedure: Left ventriculography;  Surgeon: Cristian Saleh MD;  Location: [facility] CATH INVASIVE LOCATION;  Service: Cardiovascular;  Laterality: N/A;     SECTION      x2    COLONOSCOPY      X 2    ENDOSCOPY N/A 2024    Procedure: ESOPHAGOGASTRODUODENOSCOPY WITH BIOPSY;  Surgeon: Randal Calix Jr., MD;  Location: [facility] ENDOSCOPY;  Service: General;  Laterality: N/A;  PRE- DYSPEPSIA, H/LO SLEEVE  POST-GASTRITIS    GASTRIC BYPASS N/A 2024    Procedure: Robotic/Laparoscopic gastric restrictive procedure with gastric bypass and small intestine reconstruction to limit absorpion, LYSIS OF ADHESIONS;  Surgeon: Randal Calix Jr., MD;  [place] [facility] [place] [facility] [place]  Service: Robotics - DaVinci;  Laterality: N/A;    GASTRIC SLEEVE LAPAROSCOPIC N/A 2022    Procedure: GASTRIC SLEEVE LAPAROSCOPIC OR ROBOTIC, HIATAL HERNIA REPAIR;  Surgeon: Randal Calix Jr., MD;  Location: [facility] OR [facility];  Service: Robotics - DaVinci;  Laterality: N/A;    HEMORRHOIDECTOMY      HERNIA REPAIR      HYSTERECTOMY SUPRACERVICAL Bilateral 01/15/2018    Procedure: LAPAROSCOPIC SUPRACERVICAL HYSTERECTOMY WITH DAVINCI ROBOT BILATERAL SALPINGECTOMY;  Surgeon: Brigitte Roldan MD;  Location: [facility] MAIN OR;  Service:     LIPOMA EXCISION      back    WISDOM TOOTH EXTRACTION      THREE     Family History   Adopted: Yes   Problem Relation Age of Onset    Diabetes Father      Other (hypoglycemic) Father     Malig Hyperthermia Neg Hx      Social History     Tobacco Use    Smoking status: Never     Passive exposure: Never    Smokeless tobacco: Never   Vaping Use    Vaping status: Never Used   Substance Use Topics    Alcohol use: Not Currently    Drug use: Never     No current facility-administered medications on file prior to encounter.     Current Outpatient Medications on File Prior to Encounter   Medication Sig Dispense Refill    albuterol sulfate  (90 Base) MCG/ACT inhaler INHALE 2 PUFFS BY MOUTH EVERY 6 HOURS AS NEEDED 8.5 g 3    atorvastatin (LIPITOR) 40 MG tablet TAKE 1 TABLET BY MOUTH EVERY NIGHT 90 tablet 1    B-Complex-C-Biotin-Minerals-FA (Folbee Plus CZ) 5 MG tablet tablet Take 1 tablet by mouth Daily.      Calcium 500-100 MG-UNIT chewable tablet Chew 3 tablets Daily. HOLD PRIOR TO SURG      cyanocobalamin 1000 MCG/ML injection Inject 1 mL into the appropriate muscle as directed by prescriber Every 30 (Thirty) Days for 6 doses. 1 mL 2    ferrous sulfate 140 (45 Fe) MG tablet controlled-release tablet Take  by mouth Daily With Breakfast. HOLD PRIOR TO SURG      indapamide (LOZOL) 2.5 MG tablet Take 1 tablet by mouth Daily As Needed (swelling). 30 tablet 6    lisinopril (PRINIVIL,ZESTRIL) 10 MG tablet Take 2 tablets by mouth Daily. (Patient taking differently: Take 1 tablet by mouth Daily.)      loratadine (CLARITIN) 10 MG tablet Take 1 tablet by mouth Daily As Needed.      multivitamin with minerals tablet tablet Take 1 tablet by mouth Daily. HOLD PRIOR TO SURG      sucralfate (CARAFATE) 1 g tablet Take 1 tablet by mouth 4 (Four) Times a Day.      ursodiol (Actigall) 300 MG capsule Take 1 capsule by mouth 2 (Two) Times a Day. 60 capsule 5    vitamin C (ASCORBIC ACID) 250 MG tablet Take 1 tablet by mouth 2 (Two) Times a Week. HOLD PRIOR TO SURG      diphenhydrAMINE (Benadryl Allergy) 25 MG tablet 1 tab 6pm 1 tab 11pm the night before procedure 1 tab 6am morning of  "procedure 3 tablet 0    lidocaine 3 % cream cream Apply 1 dose topically to the appropriate area as directed Every 4 (Four) Hours As Needed.      ondansetron ODT (ZOFRAN-ODT) 4 MG disintegrating tablet Place 1 tablet on the tongue Every 8 (Eight) Hours As Needed for Nausea. 20 tablet 0    Syringe 22G X 1\" 3 ML misc Use 6 each Every 30 (Thirty) Days. 6 each 2     Allergies   Allergen Reactions    Iodine Anaphylaxis, Itching and Nausea And Vomiting    Shellfish-Derived Products Anaphylaxis, Itching and Nausea And Vomiting       Objective    Objective     Vital Signs  Temp:  [97.7 °F (36.5 °C)-97.9 °F (36.6 °C)] 97.9 °F (36.6 °C)  Heart Rate:  [60-96] 65  Resp:  [16-18] 16  BP: (124-152)/(78-97) 131/78  SpO2:  [93 %-98 %] 98 %  on   ;   Device (Oxygen Therapy): room air  Body mass index is 35.88 kg/m².    Physical Exam  Constitutional:       General: She is not in acute distress.  Pulmonary:      Effort: Pulmonary effort is normal. No respiratory distress.      Breath sounds: No stridor.   Abdominal:      General: There is no distension.      Palpations: There is no mass.      Tenderness: There is no abdominal tenderness. There is no guarding.      Hernia: No hernia is present.   Skin:     Coloration: Skin is not jaundiced.      Findings: No bruising.   Neurological:      General: No focal deficit present.      Mental Status: She is alert.   Psychiatric:         Mood and Affect: Mood normal.         Behavior: Behavior normal.         Results Review:    I have personally reviewed:  [x]  Laboratory  [x]  Old records  [x]  Radiology   []  EKG/Telemetry   []  Microbiology    []  Cardiology/Vascular   []  Pathology     Lab Results (last 24 hours)       Procedure Component Value Units Date/Time    CBC & Differential [[id number]]  (Abnormal) Collected: 11/19/24 1947    Specimen: Blood from Arm, Left Updated: 11/19/24 2010    Narrative:      The following orders were created for panel order CBC & Differential.  Procedure        "                        Abnormality         Status                     ---------                               -----------         ------                     CBC Auto Differential[[id number]]        Abnormal            Final result                 Please view results for these tests on the individual orders.    Comprehensive Metabolic Panel [[id number]]  (Abnormal) Collected: 11/19/24 1947    Specimen: Blood from Arm, Left Updated: 11/19/24 2032     Glucose 93 mg/dL      BUN 15 mg/dL      Creatinine 0.77 mg/dL      Sodium 142 mmol/L      Potassium 3.8 mmol/L      Chloride 106 mmol/L      CO2 30.0 mmol/L      Calcium 9.7 mg/dL      Total Protein 7.3 g/dL      Albumin 4.2 g/dL      ALT (SGPT) 17 U/L      AST (SGOT) 25 U/L      Alkaline Phosphatase 118 U/L      Total Bilirubin 0.4 mg/dL      Globulin 3.1 gm/dL      A/G Ratio 1.4 g/dL      BUN/Creatinine Ratio 19.5     Anion Gap 6.0 mmol/L      eGFR 92.9 mL/min/1.73     Narrative:      GFR Normal >60  Chronic Kidney Disease <60  Kidney Failure <15      Lipase [[id number]]  (Normal) Collected: 11/19/24 1947    Specimen: Blood from Arm, Left Updated: 11/19/24 2032     Lipase 49 U/L     Lactic Acid, Plasma [[id number]]  (Normal) Collected: 11/19/24 1947    Specimen: Blood from Arm, Left Updated: 11/19/24 2025     Lactate 0.8 mmol/L     CBC Auto Differential [[id number]]  (Abnormal) Collected: 11/19/24 1947    Specimen: Blood from Arm, Left Updated: 11/19/24 2010     WBC 3.74 10*3/mm3      RBC 4.23 10*6/mm3      Hemoglobin 10.6 g/dL      Hematocrit 32.8 %      MCV 77.5 fL      MCH 25.1 pg      MCHC 32.3 g/dL      RDW 15.2 %      RDW-SD 41.9 fl      MPV 11.1 fL      Platelets 145 10*3/mm3      Neutrophil % 66.8 %      Lymphocyte % 20.3 %      Monocyte % 8.6 %      Eosinophil % 3.7 %      Basophil % 0.3 %      Immature Grans % 0.3 %      Neutrophils, Absolute 2.50 10*3/mm3      Lymphocytes, Absolute 0.76 10*3/mm3      Monocytes, Absolute 0.32 10*3/mm3      Eosinophils, Absolute  0.14 10*3/mm3      Basophils, Absolute 0.01 10*3/mm3      Immature Grans, Absolute 0.01 10*3/mm3     Urinalysis With Microscopic If Indicated (No Culture) - Urine, Clean Catch [[id number]]  (Abnormal) Collected: 11/19/24 1949    Specimen: Urine, Clean Catch Updated: 11/19/24 2010     Color, UA Yellow     Appearance, UA Clear     pH, UA <=5.0     Specific Gravity, UA 1.027     Glucose, UA Negative     Ketones, UA Trace     Bilirubin, UA Negative     Blood, UA Moderate (2+)     Protein, UA 30 mg/dL (1+)     Leuk Esterase, UA Negative     Nitrite, UA Negative     Urobilinogen, UA 0.2 E.U./dL    Urinalysis, Microscopic Only - Urine, Clean Catch [[id number]]  (Abnormal) Collected: 11/19/24 1949    Specimen: Urine, Clean Catch Updated: 11/19/24 2014     RBC, UA 6-10 /HPF      WBC, UA 0-2 /HPF      Bacteria, UA None Seen /HPF      Squamous Epithelial Cells, UA 0-2 /HPF      Hyaline Casts, UA 0-2 /LPF      Methodology Automated Microscopy    Basic Metabolic Panel [[id number]]  (Abnormal) Collected: 11/20/24 0302    Specimen: Blood Updated: 11/20/24 0347     Glucose 128 mg/dL      BUN 13 mg/dL      Creatinine 0.73 mg/dL      Sodium 139 mmol/L      Potassium 3.9 mmol/L      Chloride 105 mmol/L      CO2 26.0 mmol/L      Calcium 9.4 mg/dL      BUN/Creatinine Ratio 17.8     Anion Gap 8.0 mmol/L      eGFR 99.1 mL/min/1.73     Narrative:      GFR Normal >60  Chronic Kidney Disease <60  Kidney Failure <15      CBC (No Diff) [[id number]]  (Abnormal) Collected: 11/20/24 0302    Specimen: Blood Updated: 11/20/24 0337     WBC 4.38 10*3/mm3      RBC 4.21 10*6/mm3      Hemoglobin 10.3 g/dL      Hematocrit 32.7 %      MCV 77.7 fL      MCH 24.5 pg      MCHC 31.5 g/dL      RDW 14.8 %      RDW-SD 41.3 fl      MPV 11.7 fL      Platelets 135 10*3/mm3             Imaging Results (Last 24 Hours)       Procedure Component Value Units Date/Time    CT Abdomen Pelvis With Contrast [[id number]] Collected: 11/20/24 1118     Updated: 11/20/24 1130     Narrative:      CT ABDOMEN PELVIS W CONTRAST-     INDICATION: Concern for lymphoproliferative disorder     COMPARISON: CT abdomen pelvis November 19, 2024     TECHNIQUE:  Routine CT abdomen/pelvis with IV contrast. Coronal and sagittal  reformats. Radiation dose reduction techniques were utilized, including  automated exposure control and exposure modulation based on body size.     FINDINGS:      Lung bases: Subsegmental atelectasis at the bases. Small amount of  pericardial fluid. Enlarged pericardiophrenic angle lymph nodes. For  example, anterior pericardiophrenic angle lymph node, series 2, axial  mage 11, measures 1.1 cm.     ABDOMEN: Riedel's lobe. Gallbladder is partially contracted. Small  gallstones or gallbladder sludge. No biliary ductal dilatation.  Borderline splenomegaly. Incidental splenule. No pancreatic mass or  pancreatic ductal dilatation seen. No adrenal nodules. No renal mass or  hydronephrosis.     Pelvis: Fluid attenuating cystic mass in the right vulva, series 2,  axial mage 136, measuring 2.7 cm, suspect a Bartholin gland cyst.  Underdistended bladder. No bladder calculus. Hysterectomy. No adnexal  mass. Free intraperitoneal fluid seen in the cul-de-sac. Trace free  fluid in the right paracolic gutter.     Bowel: Gastric sleeve. Gastrojejunostomy. No obstruction. Colonic  diverticulosis. Normal appendix.     Abdominal wall: Abdominal and pelvic wall scarring. Mild bilateral groin  lymphadenopathy. For example, left groin lymph nodes, series 2, axial  mage 119, measures 1.4 cm. For example, left upper thigh/groin lymph  node, series 2, axial mage 152, measures 1.8 cm.     Retroperitoneum/peritoneal cavity: Abdominal and pelvic retroperitoneal  lymphadenopathy. For example, right external iliac chain/pelvic sidewall  lymph node, series 2, axial mage 115, measures 1.8 cm. For example,  right pelvic sidewall lymph node, series 2, axial mage 104, measures 2.2  cm. For example, aortocaval lymph  node, series 2, axial mage 53,  measuring 1.8 cm. Abdominal retroperitoneal lymphadenopathy is nearly  confluent, with uplifting of the aorta from the spine, suggestive of  lymphoma. Gastrohepatic ligament lymphadenopathy. For example,  periportal lymph node, series 2, axial mage 34, measures 1.7 cm.  Mesenteric lymphadenopathy with conglomerate cristofer mass on series 2,  axial mage 83, measuring 5.0 x 5.3 cm, without narrowing of vessels  coursing through the cristofer mass, suggestive of lymphoma. Transverse  mesocolon lymphadenopathy. For example, transverse mesocolon lymph node,  series 2, axial mage 54, measuring 1.2 cm.     Vasculature: Patent. No abdominal aortic aneurysm.     Osseous structures: No destructive osseous lesions. Small amount of left  hip osteoarthritis. Lumbar facet degenerative arthropathy.     Other: Sarita mesentery, may be related to lymphatic obstruction.       Impression:         1. Generalized lymphadenopathy, with features consistent with lymphoma.  Recommend sampling. CT chest could evaluate for lymphadenopathy in the  chest.  2. Small amount of free fluid seen in the pelvis.  3. Possible gallbladder sludge or small gallstones.  4. Sarita mesentery, suspect related to lymphatic obstruction.  5. Bartholin gland cyst.  6. Borderline splenomegaly     This report was finalized on 11/20/2024 11:32 AM by Dr. Yunier Salazar M.D on Workstation: [id number]       CT Abdomen Pelvis Without Contrast [[id number]] Collected: 11/19/24 2115     Updated: 11/19/24 2127    Narrative:      CT ABDOMEN AND PELVIS WITHOUT IV CONTRAST     HISTORY: Right upper quadrant and right mid abdominal pain     TECHNIQUE: Radiation dose reduction techniques were utilized, including  automated exposure control and exposure modulation based on body size.  Axial images were obtained through the abdomen and pelvis without the  administration of IV contrast. Coronal and sagittal reformatted images  were obtained.      COMPARISON: None available     FINDINGS:      ABDOMEN:  There is mild atelectasis or scarring in the lung bases. There are some  mildly enlarged lymph nodes in the right epicardial fat pad with index  node measuring about 9 mm in greatest short axis dimension. The liver is  unremarkable. The gallbladder is unremarkable. Spleen is normal in size.  Kidneys are unremarkable. The adrenal glands are unremarkable. Pancreas  appears unremarkable. Postop changes of gastric bypass surgery. There  appear to be multiple enlarged retroperitoneal lymph nodes. The largest  of these measures roughly 1.7 cm in greatest short axis dimension. There  also appears to be multiple enlarged mesenteric lymph nodes with a  suspected cristofer mass in the mid abdomen measuring about 4.8 cm. There is  some ill-defined stranding and edema in the central mesentery.     PELVIS:  Bladder is unremarkable. Trace free fluid in the pelvis. The colon  appears unremarkable. Appendix is normal. Prominent inguinal lymph nodes  are demonstrated. There are some enlarged iliac chain lymph nodes with  the largest on the right measuring about 2.4 cm and on the left  measuring about 1.3 cm in greatest short axis dimension. No acute bony  abnormality       Impression:      1. Multiple enlarged lymph nodes in the retroperitoneum, mesentery, and  in the pelvis. There also appears to be a suspected large cristofer mass in  the mid abdomen. Overall findings are concerning for lymphoproliferative  disorder such as lymphoma. Would recommend a CT scan of the abdomen  pelvis with IV contrast to better delineate the lymphadenopathy.  2. Additional findings as described           Radiation dose reduction techniques were utilized, including automated  exposure control and exposure modulation based on body size.        This report was finalized on 11/19/2024 9:24 PM by Dr. Ronak Zuniga M.D on Workstation: [id number]               Results for orders placed during the  hospital encounter of 07/11/24    Adult Transthoracic Echo Complete W/ Cont if Necessary Per Protocol    Interpretation Summary    Left ventricular ejection fraction appears to be 66 - 70%.    Left ventricular diastolic function was normal.    The right atrial cavity is borderline dilated.    Estimated right ventricular systolic pressure from tricuspid regurgitation is normal (<35 mmHg).      No orders to display        Assessment/Plan     Active Hospital Problems    Diagnosis  POA    **Abdominal pain, acute, right upper quadrant [R10.11]  Yes    Asthma [J45.909]  Yes    HTN (hypertension) [I10]  Yes    Iron deficiency anemia secondary to inadequate dietary iron intake [D50.8]  Yes      Resolved Hospital Problems   No resolved problems to display.       Ms. Mcdowell is a 54 y.o. female with HTN, history of gastric bypass surgery presenting with abdominal pain, CT scan showing generalized lymphadenopathy    Generalized lymphadenopathy  -Features on CT scan Consistent with lymphoma  -CBC with differential does not show any evidence of leukemia  -Oncology consulted    History of sleeve gastrectomy  -Resume home vitamins, ursodiol, sucralfate    HTN  Lisinopril    I discussed the patient's findings and my recommendations with patient and nursing staff.    VTE Prophylaxis - SCDs.  Code Status - Full code.       Danyel Mark MD  [facility]  11/20/24  13:24 EST

## 2024-11-20 NOTE — PLAN OF CARE
Goal Outcome Evaluation:      Patient discharged this afternoon, patient drove self. Verbalized understanding of follow up appointments, general education. To follow up with MD Sanchez in 2 weeks. All questions answered. IV removed.

## 2024-11-20 NOTE — ED NOTES
"Nursing report ED to floor  Jessica Mcdowell  54 y.o.  female    HPI :  HPI  Stated Reason for Visit: RLQ abd pain starting today. no n/v/d. worse with movement. reports some urinary frequency  History Obtained From: patient    Chief Complaint  Chief Complaint   Patient presents with    Abdominal Pain       Admitting doctor:   Torrey Pierce DO    Admitting diagnosis:   The primary encounter diagnosis was Abdominal pain, acute, right upper quadrant. A diagnosis of Abnormal CT of the abdomen was also pertinent to this visit.    Code status:   Current Code Status       Date Active Code Status Order ID Comments User Context       Prior            Allergies:   Iodine and Shellfish-derived products    Isolation:   No active isolations    Intake and Output  No intake or output data in the 24 hours ending 11/19/24 2311    Weight:       11/19/24 1933   Weight: 107 kg (235 lb)       Most recent vitals:   Vitals:    11/19/24 1933 11/19/24 1938 11/19/24 2051 11/19/24 2221   BP:  146/90 142/92 152/97   BP Location:  Right arm     Patient Position:  Sitting     Pulse: 96  60 65   Resp: 18      Temp: 97.9 °F (36.6 °C)      TempSrc: Tympanic      SpO2: 96%   97%   Weight: 107 kg (235 lb)      Height: 172.7 cm (68\")          Active LDAs/IV Access:   Lines, Drains & Airways       Active LDAs       Name Placement date Placement time Site Days    Peripheral IV 09/25/24 0804 Posterior;Right Wrist 09/25/24  0804  Wrist  55    Peripheral IV 11/19/24 2029 Right Antecubital 11/19/24 2029  Antecubital  less than 1                    Labs (abnormal labs have a star):   Labs Reviewed   COMPREHENSIVE METABOLIC PANEL - Abnormal; Notable for the following components:       Result Value    CO2 30.0 (*)     Alkaline Phosphatase 118 (*)     All other components within normal limits    Narrative:     GFR Normal >60  Chronic Kidney Disease <60  Kidney Failure <15     URINALYSIS W/ MICROSCOPIC IF INDICATED (NO CULTURE) - Abnormal; Notable for the " following components:    Ketones, UA Trace (*)     Blood, UA Moderate (2+) (*)     Protein, UA 30 mg/dL (1+) (*)     All other components within normal limits   CBC WITH AUTO DIFFERENTIAL - Abnormal; Notable for the following components:    Hemoglobin 10.6 (*)     Hematocrit 32.8 (*)     MCV 77.5 (*)     MCH 25.1 (*)     All other components within normal limits   URINALYSIS, MICROSCOPIC ONLY - Abnormal; Notable for the following components:    RBC, UA 6-10 (*)     All other components within normal limits   LIPASE - Normal   LACTIC ACID, PLASMA - Normal   RAINBOW DRAW    Narrative:     The following orders were created for panel order [facility] Draw.  Procedure                               Abnormality         Status                     ---------                               -----------         ------                     Green Top (Gel)[[id number]]                                  Final result               Lavender Top[[id number]]                                     Final result               Gold Top - SST[[id number]]                                   Final result               Light Blue Top[[id number]]                                   Final result                 Please view results for these tests on the individual orders.   CBC AND DIFFERENTIAL    Narrative:     The following orders were created for panel order CBC & Differential.  Procedure                               Abnormality         Status                     ---------                               -----------         ------                     CBC Auto Differential[[id number]]        Abnormal            Final result                 Please view results for these tests on the individual orders.   GREEN TOP   LAVENDER TOP   GOLD TOP - SST   LIGHT BLUE TOP       EKG:   No orders to display       Meds given in ED:   Medications   sodium chloride 0.9 % flush 10 mL (has no administration in time range)   sodium chloride 0.9 % flush 10 mL (has no administration  in time range)   ketorolac (TORADOL) injection 15 mg (15 mg Intravenous Given 11/19/24 2018)   diphenhydrAMINE (BENADRYL) injection 25 mg (25 mg Intravenous Given 11/19/24 2252)   methylPREDNISolone sodium succinate (SOLU-Medrol) injection 40 mg (40 mg Intravenous Given 11/19/24 2251)       Imaging results:  CT Abdomen Pelvis Without Contrast    Result Date: 11/19/2024  1. Multiple enlarged lymph nodes in the retroperitoneum, mesentery, and in the pelvis. There also appears to be a suspected large cristofer mass in the mid abdomen. Overall findings are concerning for lymphoproliferative disorder such as lymphoma. Would recommend a CT scan of the abdomen pelvis with IV contrast to better delineate the lymphadenopathy. 2. Additional findings as described    Radiation dose reduction techniques were utilized, including automated exposure control and exposure modulation based on body size.   This report was finalized on 11/19/2024 9:24 PM by Dr. Ronak Zuniga M.D on Workstation: [id number]       Ambulatory status:   - up ad vinny      Social issues:   Social History     Socioeconomic History    Marital status: Single   Tobacco Use    Smoking status: Never     Passive exposure: Never    Smokeless tobacco: Never   Vaping Use    Vaping status: Never Used   Substance and Sexual Activity    Alcohol use: Not Currently    Drug use: Never    Sexual activity: Defer     Birth control/protection: Condom, Surgical, Hysterectomy       Peripheral Neurovascular  Peripheral Neurovascular (Adult)  Peripheral Neurovascular WDL: WDL    Neuro Cognitive  Neuro Cognitive (Adult)  Cognitive/Neuro/Behavioral WDL: WDL    Learning  Learning Assessment  Learning Readiness and Ability: no barriers identified    Respiratory  Respiratory  Airway WDL: WDL  Respiratory WDL  Respiratory WDL: WDL    Abdominal Pain       Pain Assessments  Pain (Adult)  (0-10) Pain Rating: Rest: 4  Pain Location: abdomen  Response to Pain Interventions: interventions  effective per patient    NIH Stroke Scale       Odalys Rod RN  11/19/24 23:11 EST

## 2024-11-20 NOTE — ED NOTES
Patient ambulatory to ED with c/o lower lower right abdominal pain. Denies N/V/D/Constipation. Patient said the pain woke up her up last night and is worse with movement.

## 2024-11-20 NOTE — DISCHARGE SUMMARY
"    Patient Name: Jessica Mcdowell  : 1970  MRN: [id number]    Date of Admission: 2024  Date of Discharge:  2024  Primary Care Physician: Emilia Bull, AARTI      Chief Complaint:   Abdominal Pain      Discharge Diagnoses     Active Hospital Problems    Diagnosis  POA    **Abdominal pain, acute, right upper quadrant [R10.11]  Yes    Generalized enlarged lymph nodes [R59.1]  Yes    Asthma [J45.909]  Yes    HTN (hypertension) [I10]  Yes    Iron deficiency anemia secondary to inadequate dietary iron intake [D50.8]  Yes      Resolved Hospital Problems   No resolved problems to display.        Admitting HPI     \"Ms. Mcdowell is a 54 y.o. female with HTN, history of gastric bypass surgery presenting with abdominal pain.  Patient had pain starting yesterday mainly on the right side of her abdomen, it was so severe it actually woke her up from sleep.  Pain has lessened in intensity since that time and she actually does not complain of any presently.  She had sleeve gastrectomy in September of this year.  States that she has been intentionally losing weight since that time.  Otherwise she has not had any fevers, chills, night sweats.  Has been in usual state of health.  Tolerating diet and no early satiety. \"    Hospital Course     Pt admitted for RUQ abdominal pain. Imaging revealed generalized lymphadenopathy.  She was seen in consultation with oncology. Per their note:    'Discussion with patient today regarding findings on CT scans that are highly suspicious for underlying lymphoma. The patient is very anxious to leave the hospital today, reports that she cannot stay in the hospital for further evaluation. We will add LDH and uric acid to today's labs. We discussed pursuit of ultrasound-guided biopsy right inguinal lymph node as outpatient to be sent for pathologic review and flow cytometry. We did discuss the potential future need for excisional lymph node biopsy based on the initial pathology " result. We will schedule outpatient PET scan following the biopsy with presumed diagnosis of lymphoma. I will see her back after the PET scan in the office to review results and discuss potential need for additional evaluation. '  Pt stated that 'she must leave hospital to deal with a few things.' Given clinical stability and f/u arranged with Oncology, we will discharge her. Stressed importance of f/u, which she expressed understanding.     Discharge Plan         Day of Discharge     Physical Exam:  Temp:  [97.7 °F (36.5 °C)-97.9 °F (36.6 °C)] 97.9 °F (36.6 °C)  Heart Rate:  [60-71] 65  Resp:  [16] 16  BP: (124-152)/(78-97) 131/78  Body mass index is 35.88 kg/m².  Physical Exam  Constitutional:       General: She is not in acute distress.  Pulmonary:      Effort: Pulmonary effort is normal. No respiratory distress.      Breath sounds: No stridor.   Abdominal:      General: There is no distension.      Tenderness: There is no abdominal tenderness. There is no right CVA tenderness, left CVA tenderness or guarding.   Skin:     Coloration: Skin is not jaundiced.      Findings: No bruising.   Neurological:      General: No focal deficit present.      Mental Status: She is alert.   Psychiatric:         Mood and Affect: Mood normal.         Behavior: Behavior normal.         Consultants     Consult Orders (all) (From admission, onward)       Start     Ordered    11/20/24 1156  Hematology & Oncology Inpatient Consult  Once        Specialty:  Hematology and Oncology  Provider:  Vin Medellin MD PhD    11/20/24 1156    11/19/24 2215  [facility] (on-call MD unless specified) Details  Once        Specialty:  Hospitalist  Provider:  (Not yet assigned)    11/19/24 2214                  Procedures     * Surgery not found *      Imaging Results (All)       Procedure Component Value Units Date/Time    CT Abdomen Pelvis With Contrast [[id number]] Collected: 11/20/24 1118     Updated: 11/20/24 1135    Narrative:      CT ABDOMEN PELVIS W  CONTRAST-     INDICATION: Concern for lymphoproliferative disorder     COMPARISON: CT abdomen pelvis November 19, 2024     TECHNIQUE:  Routine CT abdomen/pelvis with IV contrast. Coronal and sagittal  reformats. Radiation dose reduction techniques were utilized, including  automated exposure control and exposure modulation based on body size.     FINDINGS:      Lung bases: Subsegmental atelectasis at the bases. Small amount of  pericardial fluid. Enlarged pericardiophrenic angle lymph nodes. For  example, anterior pericardiophrenic angle lymph node, series 2, axial  mage 11, measures 1.1 cm.     ABDOMEN: Riedel's lobe. Gallbladder is partially contracted. Small  gallstones or gallbladder sludge. No biliary ductal dilatation.  Borderline splenomegaly. Incidental splenule. No pancreatic mass or  pancreatic ductal dilatation seen. No adrenal nodules. No renal mass or  hydronephrosis.     Pelvis: Fluid attenuating cystic mass in the right vulva, series 2,  axial mage 136, measuring 2.7 cm, suspect a Bartholin gland cyst.  Underdistended bladder. No bladder calculus. Hysterectomy. No adnexal  mass. Free intraperitoneal fluid seen in the cul-de-sac. Trace free  fluid in the right paracolic gutter.     Bowel: Gastric sleeve. Gastrojejunostomy. No obstruction. Colonic  diverticulosis. Normal appendix.     Abdominal wall: Abdominal and pelvic wall scarring. Mild bilateral groin  lymphadenopathy. For example, left groin lymph nodes, series 2, axial  mage 119, measures 1.4 cm. For example, left upper thigh/groin lymph  node, series 2, axial mage 152, measures 1.8 cm.     Retroperitoneum/peritoneal cavity: Abdominal and pelvic retroperitoneal  lymphadenopathy. For example, right external iliac chain/pelvic sidewall  lymph node, series 2, axial mage 115, measures 1.8 cm. For example,  right pelvic sidewall lymph node, series 2, axial mage 104, measures 2.2  cm. For example, aortocaval lymph node, series 2, axial mage  53,  measuring 1.8 cm. Abdominal retroperitoneal lymphadenopathy is nearly  confluent, with uplifting of the aorta from the spine, suggestive of  lymphoma. Gastrohepatic ligament lymphadenopathy. For example,  periportal lymph node, series 2, axial mage 34, measures 1.7 cm.  Mesenteric lymphadenopathy with conglomerate cristofer mass on series 2,  axial mage 83, measuring 5.0 x 5.3 cm, without narrowing of vessels  coursing through the cristofer mass, suggestive of lymphoma. Transverse  mesocolon lymphadenopathy. For example, transverse mesocolon lymph node,  series 2, axial mage 54, measuring 1.2 cm.     Vasculature: Patent. No abdominal aortic aneurysm.     Osseous structures: No destructive osseous lesions. Small amount of left  hip osteoarthritis. Lumbar facet degenerative arthropathy.     Other: Sarita mesentery, may be related to lymphatic obstruction.       Impression:         1. Generalized lymphadenopathy, with features consistent with lymphoma.  Recommend sampling. CT chest could evaluate for lymphadenopathy in the  chest.  2. Small amount of free fluid seen in the pelvis.  3. Possible gallbladder sludge or small gallstones.  4. Sarita mesentery, suspect related to lymphatic obstruction.  5. Bartholin gland cyst.  6. Borderline splenomegaly     This report was finalized on 11/20/2024 11:32 AM by Dr. Yunier Salazar M.D on Workstation: [id number]       CT Abdomen Pelvis Without Contrast [[id number]] Collected: 11/19/24 2115     Updated: 11/19/24 2127    Narrative:      CT ABDOMEN AND PELVIS WITHOUT IV CONTRAST     HISTORY: Right upper quadrant and right mid abdominal pain     TECHNIQUE: Radiation dose reduction techniques were utilized, including  automated exposure control and exposure modulation based on body size.  Axial images were obtained through the abdomen and pelvis without the  administration of IV contrast. Coronal and sagittal reformatted images  were obtained.     COMPARISON: None available      FINDINGS:      ABDOMEN:  There is mild atelectasis or scarring in the lung bases. There are some  mildly enlarged lymph nodes in the right epicardial fat pad with index  node measuring about 9 mm in greatest short axis dimension. The liver is  unremarkable. The gallbladder is unremarkable. Spleen is normal in size.  Kidneys are unremarkable. The adrenal glands are unremarkable. Pancreas  appears unremarkable. Postop changes of gastric bypass surgery. There  appear to be multiple enlarged retroperitoneal lymph nodes. The largest  of these measures roughly 1.7 cm in greatest short axis dimension. There  also appears to be multiple enlarged mesenteric lymph nodes with a  suspected cristofer mass in the mid abdomen measuring about 4.8 cm. There is  some ill-defined stranding and edema in the central mesentery.     PELVIS:  Bladder is unremarkable. Trace free fluid in the pelvis. The colon  appears unremarkable. Appendix is normal. Prominent inguinal lymph nodes  are demonstrated. There are some enlarged iliac chain lymph nodes with  the largest on the right measuring about 2.4 cm and on the left  measuring about 1.3 cm in greatest short axis dimension. No acute bony  abnormality       Impression:      1. Multiple enlarged lymph nodes in the retroperitoneum, mesentery, and  in the pelvis. There also appears to be a suspected large cristofer mass in  the mid abdomen. Overall findings are concerning for lymphoproliferative  disorder such as lymphoma. Would recommend a CT scan of the abdomen  pelvis with IV contrast to better delineate the lymphadenopathy.  2. Additional findings as described           Radiation dose reduction techniques were utilized, including automated  exposure control and exposure modulation based on body size.        This report was finalized on 11/19/2024 9:24 PM by Dr. Ronak Zuniga M.D on Workstation: [id number]             Results for orders placed during the hospital encounter of  "08/07/22    Duplex Venous Upper Extremity - Left CAR    Interpretation Summary  · Chronic left upper extremity superficial thrombophlebitis noted in the cephalic (forearm).  · All other left sided vessels appear normal.    Results for orders placed during the hospital encounter of 07/11/24    Adult Transthoracic Echo Complete W/ Cont if Necessary Per Protocol    Interpretation Summary    Left ventricular ejection fraction appears to be 66 - 70%.    Left ventricular diastolic function was normal.    The right atrial cavity is borderline dilated.    Estimated right ventricular systolic pressure from tricuspid regurgitation is normal (<35 mmHg).    Pertinent Labs     Results from last 7 days   Lab Units 11/20/24 0302 11/19/24 1947   WBC 10*3/mm3 4.38 3.74   HEMOGLOBIN g/dL 10.3* 10.6*   PLATELETS 10*3/mm3 135* 145     Results from last 7 days   Lab Units 11/20/24 0302 11/19/24 1947   SODIUM mmol/L 139 142   POTASSIUM mmol/L 3.9 3.8   CHLORIDE mmol/L 105 106   CO2 mmol/L 26.0 30.0*   BUN mg/dL 13 15   CREATININE mg/dL 0.73 0.77   GLUCOSE mg/dL 128* 93   EGFR mL/min/1.73 99.1 92.9     Results from last 7 days   Lab Units 11/19/24 1947   ALBUMIN g/dL 4.2   BILIRUBIN mg/dL 0.4   ALK PHOS U/L 118*   AST (SGOT) U/L 25   ALT (SGPT) U/L 17     Results from last 7 days   Lab Units 11/20/24 0302 11/19/24 1947   CALCIUM mg/dL 9.4 9.7   ALBUMIN g/dL  --  4.2     Results from last 7 days   Lab Units 11/19/24 1947   LIPASE U/L 49       Results from last 7 days   Lab Units 11/20/24 0302   URIC ACID mg/dL 7.4*         Invalid input(s): \"LDLCALC\"          Test Results Pending at Discharge       Discharge Details        Discharge Medications        Continue These Medications        Instructions Start Date   albuterol sulfate  (90 Base) MCG/ACT inhaler  Commonly known as: PROVENTIL HFA;VENTOLIN HFA;PROAIR HFA   2 puffs, Inhalation, Every 6 Hours PRN      atorvastatin 40 MG tablet  Commonly known as: LIPITOR   40 mg, Oral, " "Nightly      Calcium 500-100 MG-UNIT chewable tablet   3 tablets, Daily      cyanocobalamin 1000 MCG/ML injection   1,000 mcg, Intramuscular, Every 30 Days      diphenhydrAMINE 25 MG tablet  Commonly known as: Benadryl Allergy   1 tab 6pm 1 tab 11pm the night before procedure 1 tab 6am morning of procedure      ferrous sulfate 140 (45 Fe) MG tablet controlled-release tablet   Daily With Breakfast      Folbee Plus CZ 5 MG tablet tablet   1 tablet, Daily      indapamide 2.5 MG tablet  Commonly known as: LOZOL   2.5 mg, Oral, Daily PRN      lidocaine 3 % cream cream   1 dose, Topical, Every 4 Hours PRN      lisinopril 10 MG tablet  Commonly known as: PRINIVIL,ZESTRIL   10 mg, Oral, Daily      loratadine 10 MG tablet  Commonly known as: CLARITIN   10 mg, Daily PRN      multivitamin with minerals tablet tablet   1 tablet, Daily      ondansetron ODT 4 MG disintegrating tablet  Commonly known as: ZOFRAN-ODT   4 mg, Translingual, Every 8 Hours PRN      sucralfate 1 g tablet  Commonly known as: CARAFATE   1 g, 4 Times Daily      Syringe 22G X 1\" 3 ML misc   6 each, Not Applicable, Every 30 Days      ursodiol 300 MG capsule  Commonly known as: Actigall   300 mg, Oral, 2 Times Daily      vitamin C 250 MG tablet  Commonly known as: ASCORBIC ACID   250 mg, 2 Times Weekly               Allergies   Allergen Reactions    Iodine Anaphylaxis, Itching and Nausea And Vomiting    Shellfish-Derived Products Anaphylaxis, Itching and Nausea And Vomiting       Discharge Disposition:  Home or Self Care      Discharge Diet:  No active diet order      Discharge Activity:   Activity Instructions       Activity as Tolerated              CODE STATUS:    Code Status and Medical Interventions: CPR (Attempt to Resuscitate); Full   Ordered at: 11/20/24 0232     Code Status (Patient has no pulse and is not breathing):    CPR (Attempt to Resuscitate)     Medical Interventions (Patient has pulse or is breathing):    Full       Future Appointments   Date " Time Provider Department Center   12/26/2024  8:30 AM Monisha Zuluaga APRN [facility] None   8/14/2025  1:15 PM Cristian Saleh MD [facility]      Follow-up Information       Randal Sanchez Jr., MD Follow up in 2 week(s).    Specialties: Hematology and Oncology, Oncology, Hematology  Contact information:  [address] [facility] [address] [id number] [phone]               Emilia Bull APRN .    Specialties: Nurse Practitioner, Internal Medicine, Family Medicine  Contact information:  [address] [facility] [id number] [phone]                             Time Spent on Discharge:  Same day observation admit and discharge.      Danyel Mark MD  [facility]  11/20/24  15:16 EST

## 2024-11-20 NOTE — CONSULTS
[facility] GROUP INITIAL INPATIENT CONSULTATION NOTE    REASON FOR CONSULTATION:    Widespread lymphadenopathy    HISTORY OF PRESENT ILLNESS:  Jessica Mcdowell is a 54 y.o. female who we are asked to see today in consultation for the above issue.      Patient with past medical history significant for hypertension, hyperlipidemia, irritable bowel syndrome, nephrolithiasis, hemorrhoids, asthma, gout.  Patient is status post gastric bypass on 9/25/2024.    Patient does note a history of iron deficiency anemia, no menstrual cycles since hysterectomy in 2018.  She did tolerate oral iron previously.  Patient notes experiencing blood in her stool in early 2024 and underwent colonoscopy with Dr. Lemos at [facility] on 5/30/2024 which showed grade 2 internal hemorrhoids with banding performed.    Patient admitted with abdominal pain in the right abdomen that was severe, was awakened from sleep.  She presented to the emergency department.  CT abdomen pelvis (without IV contrast) on 11/19/2024 showed mildly enlarged lymph nodes in the epicardial fat pad and index lymph node 9 mm.  Postoperative changes from gastric bypass.  Enlarged retroperitoneal lymph nodes, largest 1.7 cm.  Multiple enlarged mesenteric lymph nodes with suspected cristofer mass in the mid abdomen 4.8 cm with ill-defined stranding and edema in the central mesentery.  Enlarged iliac lymph nodes with the largest on the right 2.4 cm and on the left 1.3 cm.  Concern for lymphoproliferative disorder such as lymphoma.    Contrasted CT abdomen and pelvis on 11/20/2024 showed enlarged pericardiophrenic lymph nodes up to 1.1 cm.  Fluid attenuating cystic mass in the right vulva (suspected Bartholin gland cyst).  Bilateral inguinal lymphadenopathy up to 1.4 cm on the left, left upper thigh/groin lymph node 1.8 cm.  Abdominal and pelvic retroperitoneal lymphadenopathy on the right up to 1.8 cm external iliac, right pelvic sidewall 2.2 cm, aortocaval lymph node  1.8 cm.  Abdominal retroperitoneal lymphadenopathy nearly confluent with uplifting of the aorta from the spine suggestive of lymphoma.  Gastrohepatic ligament lymphadenopathy with periportal lymph node 1.7 cm.  Mesenteric lymphadenopathy with conglomerate cristofer mass measuring 5 x 5.3 cm without narrowing of vessels coursing through the mass, suggestive of lymphoma.  Transverse mesocolon lymphadenopathy up to 1.2 cm.  Borderline splenomegaly (no measurement provided).    Patient also with anemia, hemoglobin in the 10-11 range with microcytic indices, MCV in the high 70 range.  Patient also with borderline thrombocytopenia, platelet count in the 130-150,000 range.    Patient notes that abdominal pain has completely resolved at this time.  She denies any nausea or vomiting.  Bowel function has been normal.  She denies any night sweats.  She has lost weight however this is after her gastric bypass.  Appetite has been normal.  She does note some menopausal hot flashes.        Past Medical History:   Diagnosis Date    Abnormal EKG     CLEARED BY CARDIOLOGY    Asthma     Eczema     Hemorrhoids 02/16/2022    History of anemia     History of gout     History of hemorrhoids     History of kidney stones 02/16/2022    History of tachycardia     Hyperlipidemia     Hypertension     IBS (irritable bowel syndrome) 02/16/2022    Kidney stone     history    Leiomyoma     Murmur, heart     PONV (postoperative nausea and vomiting)     Rheumatoid arthritis 02/16/2022       Past Surgical History:   Procedure Laterality Date    ABDOMINOPLASTY      CARDIAC CATHETERIZATION      CARDIAC CATHETERIZATION Left 08/06/2024    Procedure: Cardiac Catheterization/Vascular Study;  Surgeon: Cristian Saleh MD;  Location: [facility] CATH INVASIVE LOCATION;  Service: Cardiovascular;  Laterality: Left;    CARDIAC CATHETERIZATION N/A 08/06/2024    Procedure: Left Heart Cath;  Surgeon: Cristian Saleh MD;  Location: [facility] INVASIVE LOCATION;   Service: Cardiovascular;  Laterality: N/A;    CARDIAC CATHETERIZATION N/A 2024    Procedure: Coronary angiography;  Surgeon: Cristian Saleh MD;  Location: [facility] CATH INVASIVE LOCATION;  Service: Cardiovascular;  Laterality: N/A;    CARDIAC CATHETERIZATION N/A 2024    Procedure: Left ventriculography;  Surgeon: Cristian Saleh MD;  Location: [facility] CATH INVASIVE LOCATION;  Service: Cardiovascular;  Laterality: N/A;     SECTION      x2    COLONOSCOPY      X 2    ENDOSCOPY N/A 2024    Procedure: ESOPHAGOGASTRODUODENOSCOPY WITH BIOPSY;  Surgeon: Randal Calix Jr., MD;  Location: [facility] ENDOSCOPY;  Service: General;  Laterality: N/A;  PRE- DYSPEPSIA, H/LO SLEEVE  POST-GASTRITIS    GASTRIC BYPASS N/A 2024    Procedure: Robotic/Laparoscopic gastric restrictive procedure with gastric bypass and small intestine reconstruction to limit absorpion, LYSIS OF ADHESIONS;  Surgeon: Randal Calix Jr., MD;  Location: [facility] OR OSC;  Service: Robotics - DaVinci;  Laterality: N/A;    GASTRIC SLEEVE LAPAROSCOPIC N/A 2022    Procedure: GASTRIC SLEEVE LAPAROSCOPIC OR ROBOTIC, HIATAL HERNIA REPAIR;  Surgeon: Randal Calix Jr., MD;  Location: [facility] OR OSC;  Service: Robotics - DaVinci;  Laterality: N/A;    HEMORRHOIDECTOMY      HERNIA REPAIR      HYSTERECTOMY SUPRACERVICAL Bilateral 01/15/2018    Procedure: LAPAROSCOPIC SUPRACERVICAL HYSTERECTOMY WITH DAVINCI ROBOT BILATERAL SALPINGECTOMY;  Surgeon: Brigitte Roldan MD;  Location: [facility] MAIN OR;  Service:     LIPOMA EXCISION      back    WISDOM TOOTH EXTRACTION      THREE       SOCIAL HISTORY:   reports that she has never smoked. She has never been exposed to tobacco smoke. She has never used smokeless tobacco. She reports that she does not currently use alcohol. She reports that she does not use drugs.    FAMILY HISTORY:  family history includes Diabetes in her father; hypoglycemic in her father. She was  "adopted.    ALLERGIES:  Allergies   Allergen Reactions    Iodine Anaphylaxis, Itching and Nausea And Vomiting    Shellfish-Derived Products Anaphylaxis, Itching and Nausea And Vomiting       MEDICATIONS:  As listed in the electronic medical record.    Review of Systems  A comprehensive review of systems was obtained with pertinent positive findings as noted in the interval history above.  All other systems negative.    Vitals:    11/20/24 0143 11/20/24 0154 11/20/24 0434 11/20/24 0709   BP: 136/85  124/86 131/78   BP Location: Right arm  Right arm Right arm   Patient Position: Sitting  Lying Lying   Pulse: 65  71 65   Resp: 16  16 16   Temp: 97.7 °F (36.5 °C)  97.7 °F (36.5 °C) 97.9 °F (36.6 °C)   TempSrc: Oral  Oral Oral   SpO2: 96%  96% 98%   Weight:  107 kg (235 lb 14.3 oz)     Height:  172.7 cm (67.99\")         Physical Exam  Constitutional:       Appearance: She is well-developed.   Eyes:      Conjunctiva/sclera: Conjunctivae normal.   Neck:      Thyroid: No thyromegaly.   Cardiovascular:      Rate and Rhythm: Normal rate and regular rhythm.      Heart sounds: No murmur heard.     No friction rub. No gallop.   Pulmonary:      Effort: No respiratory distress.      Breath sounds: Normal breath sounds.   Abdominal:      General: Bowel sounds are normal. There is no distension.      Palpations: Abdomen is soft.      Tenderness: There is no abdominal tenderness.   Lymphadenopathy:      Head:      Right side of head: No submandibular adenopathy.      Cervical: No cervical adenopathy.      Upper Body:      Right upper body: No supraclavicular adenopathy.      Left upper body: No supraclavicular adenopathy.   Skin:     General: Skin is warm and dry.      Findings: No rash.   Neurological:      Mental Status: She is alert and oriented to person, place, and time.      Cranial Nerves: No cranial nerve deficit.      Motor: No abnormal muscle tone.      Deep Tendon Reflexes: Reflexes normal.   Psychiatric:         Behavior: " Behavior normal.       DIAGNOSTIC DATA:    Results from last 7 days   Lab Units 11/20/24  0302 11/19/24 1947   WBC 10*3/mm3 4.38 3.74   HEMOGLOBIN g/dL 10.3* 10.6*   HEMATOCRIT % 32.7* 32.8*   PLATELETS 10*3/mm3 135* 145      Results from last 7 days   Lab Units 11/20/24  0302 11/19/24 1947   SODIUM mmol/L 139 142   POTASSIUM mmol/L 3.9 3.8   CHLORIDE mmol/L 105 106   CO2 mmol/L 26.0 30.0*   BUN mg/dL 13 15   CREATININE mg/dL 0.73 0.77   CALCIUM mg/dL 9.4 9.7   BILIRUBIN mg/dL  --  0.4   ALK PHOS U/L  --  118*   ALT (SGPT) U/L  --  17   AST (SGOT) U/L  --  25   GLUCOSE mg/dL 128* 93          Assessment & Plan   ASSESSMENT:  This is a 54 y.o. female with:    *Widespread lymphadenopathy and borderline splenomegaly  Patient has experienced expected weight loss after gastric bypass in September 2024.  Normal appetite.  Denies night sweats.  Patient admitted with abdominal pain in the right abdomen that was severe, was awakened from sleep.    She presented to the emergency department.    CT abdomen pelvis (without IV contrast) on 11/19/2024 showed mildly enlarged lymph nodes in the epicardial fat pad and index lymph node 9 mm.  Postoperative changes from gastric bypass.  Enlarged retroperitoneal lymph nodes, largest 1.7 cm.  Multiple enlarged mesenteric lymph nodes with suspected cristofer mass in the mid abdomen 4.8 cm with ill-defined stranding and edema in the central mesentery.  Enlarged iliac lymph nodes with the largest on the right 2.4 cm and on the left 1.3 cm.  Concern for lymphoproliferative disorder such as lymphoma.  Contrasted CT abdomen and pelvis on 11/20/2024 showed enlarged pericardiophrenic lymph nodes up to 1.1 cm.  Fluid attenuating cystic mass in the right vulva (suspected Bartholin gland cyst).  Bilateral inguinal lymphadenopathy up to 1.4 cm on the left, left upper thigh/groin lymph node 1.8 cm.  Abdominal and pelvic retroperitoneal lymphadenopathy on the right up to 1.8 cm external iliac, right  pelvic sidewall 2.2 cm, aortocaval lymph node 1.8 cm.  Abdominal retroperitoneal lymphadenopathy nearly confluent with uplifting of the aorta from the spine suggestive of lymphoma.  Gastrohepatic ligament lymphadenopathy with periportal lymph node 1.7 cm.  Mesenteric lymphadenopathy with conglomerate cristofer mass measuring 5 x 5.3 cm without narrowing of vessels coursing through the mass, suggestive of lymphoma.  Transverse mesocolon lymphadenopathy up to 1.2 cm.  Borderline splenomegaly (no measurement provided).  Discussion with patient today regarding findings on CT scans that are highly suspicious for underlying lymphoma.  The patient is very anxious to leave the hospital today, reports that she cannot stay in the hospital for further evaluation.  We will add LDH and uric acid to today's labs.  We discussed pursuit of ultrasound-guided biopsy right inguinal lymph node as outpatient to be sent for pathologic review and flow cytometry.  We did discuss the potential future need for excisional lymph node biopsy based on the initial pathology result.  We will schedule outpatient PET scan following the biopsy with presumed diagnosis of lymphoma.  I will see her back after the PET scan in the office to review results and discuss potential need for additional evaluation.    *Microcytic anemia  Patient does note a history of iron deficiency anemia, no menstrual cycles since hysterectomy in 2018.    She did tolerate oral iron previously.    Patient notes experiencing blood in her stool in early 2024 and underwent colonoscopy with Dr. Lemos at [facility] on 5/30/2024 which showed grade 2 internal hemorrhoids with banding performed  Patient is status post gastric bypass on 9/25/2024.  Patient with current anemia, hemoglobin in the 10-11 range with microcytic indices, MCV in the high 70 range.  Labs today with hemoglobin 10.3, MCV 77.7.  High suspicion for iron deficiency anemia and we will add iron panel and ferritin to  today's labs.  We will follow-up results at return visit in the outpatient setting.  Patient would be a poor candidate for oral iron given recent gastric bypass.    *Borderline thrombocytopenia  Patient with longstanding history of borderline thrombocytopenia with platelet count in the 130-150,000 range since at least 2021.  Current platelet count 135,000.  We will add labs today with IPF.  Check B12 and folate at return visit to office.  Patient with borderline splenomegaly noted on scans which may be contributing factor.    *Status post gastric bypass  Patient is status post gastric bypass on 9/25/2024.    *History of gout  Patient reports history of gout involving her toe in the past, has managed this with diet alone, no medication  We will add uric acid to today's labs      PLAN:   Additional labs today with iron panel, ferritin, LDH, uric acid  Patient is being discharged home today  We will schedule outpatient ultrasound-guided biopsy left inguinal lymph node to be sent for pathologic review and flow cytometry  Schedule PET scan following biopsy  Follow-up visit in approximately 2 to 3 weeks to review results from lymph node biopsy and PET scan.  That day we will draw CBC, CMP, LDH, uric acid, B12, folate.    Discussed with patient at bedside.        Randal Sanchez MD

## 2024-11-20 NOTE — PLAN OF CARE
Goal Outcome Evaluation:  Plan of Care Reviewed With: patient        Progress: no change  Outcome Evaluation: Pt remains stable. Awaiting CT with contrast. Has to be pre-medicated with benadryl and IV steriods r/t allergy. Minimal c/o pain. Denies n/v. Up ad vinny.

## 2024-11-21 ENCOUNTER — TRANSITIONAL CARE MANAGEMENT TELEPHONE ENCOUNTER (OUTPATIENT)
Dept: CALL CENTER | Facility: HOSPITAL | Age: 54
End: 2024-11-21
Payer: COMMERCIAL

## 2024-11-21 NOTE — OUTREACH NOTE
Call Center TCM Note      Flowsheet Row Responses   [facility] patient discharged from? [place]   Does the patient have one of the following disease processes/diagnoses(primary or secondary)? Other   TCM attempt successful? No   Unsuccessful attempts Attempt 2   Call Status Left message            Yadi Currie RN    11/21/2024, 16:50 EST

## 2024-11-21 NOTE — OUTREACH NOTE
Call Center TCM Note      Flowsheet Row Responses   [facility] patient discharged from? [place]   Does the patient have one of the following disease processes/diagnoses(primary or secondary)? Other   TCM attempt successful? No   Unsuccessful attempts Attempt 1   Call Status Left message            Yadi Currie RN    11/21/2024, 11:16 EST

## 2024-11-21 NOTE — OUTREACH NOTE
Prep Survey      Flowsheet Row Responses   [facility] patient discharged from? [place]   Is LACE score < 7 ? No   Eligibility [facility]   Date of Admission 11/19/24   Date of Discharge 11/20/24   Discharge Disposition Home or Self Care   Discharge diagnosis Abdominal pain, acute, right upper quadrant   Does the patient have one of the following disease processes/diagnoses(primary or secondary)? Other   Does the patient have Home health ordered? No   Is there a DME ordered? No   Prep survey completed? Yes            Malathi EDMOND - Registered Nurse

## 2024-11-22 ENCOUNTER — TRANSITIONAL CARE MANAGEMENT TELEPHONE ENCOUNTER (OUTPATIENT)
Dept: CALL CENTER | Facility: HOSPITAL | Age: 54
End: 2024-11-22
Payer: COMMERCIAL

## 2024-11-22 NOTE — OUTREACH NOTE
Call Center TCM Note      Flowsheet Row Responses   [facility] patient discharged from? [place]   Does the patient have one of the following disease processes/diagnoses(primary or secondary)? Other   TCM attempt successful? No   Unsuccessful attempts Attempt 3            Ivana Quevedo RN    11/22/2024, 13:59 EST

## 2024-12-02 ENCOUNTER — HOSPITAL ENCOUNTER (EMERGENCY)
Facility: HOSPITAL | Age: 54
Discharge: LEFT BEFORE DISCHARGE | End: 2024-12-02
Attending: EMERGENCY MEDICINE
Payer: COMMERCIAL

## 2024-12-02 ENCOUNTER — HOSPITAL ENCOUNTER (OUTPATIENT)
Dept: ULTRASOUND IMAGING | Facility: HOSPITAL | Age: 54
End: 2024-12-02
Payer: COMMERCIAL

## 2024-12-02 VITALS
HEART RATE: 75 BPM | HEIGHT: 68 IN | WEIGHT: 235 LBS | TEMPERATURE: 97.8 F | DIASTOLIC BLOOD PRESSURE: 97 MMHG | RESPIRATION RATE: 18 BRPM | OXYGEN SATURATION: 96 % | BODY MASS INDEX: 35.61 KG/M2 | SYSTOLIC BLOOD PRESSURE: 151 MMHG

## 2024-12-02 PROCEDURE — 99211 OFF/OP EST MAY X REQ PHY/QHP: CPT | Performed by: EMERGENCY MEDICINE

## 2024-12-02 NOTE — ED NOTES
Patient to ER via car from home for hives x September after new med ursodiol    Patient in NAD at time of triage

## 2024-12-03 ENCOUNTER — TELEPHONE (OUTPATIENT)
Dept: BARIATRICS/WEIGHT MGMT | Facility: CLINIC | Age: 54
End: 2024-12-03
Payer: COMMERCIAL

## 2024-12-03 NOTE — TELEPHONE ENCOUNTER
I spoke to this patient today and she has been breaking out with hives everytime she has taken the actigall for the last few months. She went to the Er over the weekend and they did a CT scan of some pain she was having in her abdomen. They referred her to an oncologist. The patient had so much information and wanted to come in and see Monisha sooner. I told her to stop the actigall and we would see her on Thursday.

## 2024-12-05 ENCOUNTER — OFFICE VISIT (OUTPATIENT)
Dept: BARIATRICS/WEIGHT MGMT | Facility: CLINIC | Age: 54
End: 2024-12-05
Payer: COMMERCIAL

## 2024-12-05 VITALS
DIASTOLIC BLOOD PRESSURE: 101 MMHG | TEMPERATURE: 97.5 F | HEIGHT: 68 IN | HEART RATE: 68 BPM | WEIGHT: 226 LBS | SYSTOLIC BLOOD PRESSURE: 156 MMHG | BODY MASS INDEX: 34.25 KG/M2

## 2024-12-05 DIAGNOSIS — Z98.84 S/P GASTRIC BYPASS: Primary | ICD-10-CM

## 2024-12-05 DIAGNOSIS — E66.811 OBESITY, CLASS I, BMI 30-34.9: ICD-10-CM

## 2024-12-05 PROBLEM — E66.812 OBESITY, CLASS II, BMI 35-39.9: Status: RESOLVED | Noted: 2022-08-10 | Resolved: 2024-12-05

## 2024-12-05 PROBLEM — R63.5 UNINTENDED WEIGHT GAIN: Status: RESOLVED | Noted: 2022-02-16 | Resolved: 2024-12-05

## 2024-12-05 NOTE — PROGRESS NOTES
[facility] SURG[facility]  [address]  [facility] [id number]  [phone]  [address]  [facility] [id number] [phone]  Dept: [phone]  12/5/2024      Jessica Mcdowell.  [id number]  [id number]  1970  female      Chief Complaint   Patient presents with    Follow-up     2 mo follow up [facility]       BH Post-Op Bariatric Surgery:   Jessica Mcdowell is status post Laparoscopic RNY Bypass procedure, performed on 9/25/24     HPI:     Today's weight is 103 kg (226 lb) pounds, today's BMI is Body mass index is 34.85 kg/m²., she has a loss of 9 pounds since the last visit and her weight loss since surgery is 27 pounds. The patient reports a decreased portion size and loss of appetite.    Jessica Mcdowell denies nausea, vomiting, reflux and reports having an allergic reaction to actigall. She reports hives after taking. She discontinued this two days ago. She was recently in the ER for RUQ abdominal pain and gallstones were noted on her CT scan but also significant systemic lymphadenopathy which was concerning for lymphoma. She was scheduled for outpatient follow up with oncology for a PET scan but cancelled that appointment due to concerns about other possible causes of these findings not being ruled out prior to assuming she may have cancer.     Diet and Exercise: Diet history reviewed and discussed with the patient. Weight loss/gains to date discussed with the patient. The patient states they are eating 70-80 grams of protein per day. She reports eating 3 meals per day, a typical portion size of 1/2 cup, eating 1 snacks per day, drinking 64 or more 8-oz. glasses of water per day, no carbonated beverage consumption and exercising regularly.     Supplements: bariatric MTV with iron and calcium.     Review of Systems   Constitutional:  Positive for appetite change. Negative for fatigue and unexpected weight change.   HENT: Negative.      Eyes: Negative.    Respiratory: Negative.     Cardiovascular: Negative.  Negative for leg swelling.   Gastrointestinal:  Negative for abdominal distention, abdominal pain, constipation, diarrhea, nausea and vomiting.   Genitourinary:  Negative for difficulty urinating, frequency and urgency.   Musculoskeletal:  Negative for back pain.   Skin: Negative.    Psychiatric/Behavioral: Negative.     All other systems reviewed and are negative.      Patient Active Problem List   Diagnosis    Iron deficiency anemia secondary to inadequate dietary iron intake    HTN (hypertension)    Seasonal allergies    Chronic fatigue    Heart murmur    Asthma    Chronic pain of both knees    Rheumatoid arthritis    Gout    Prediabetes    History of heart attack    Localized edema    Dietary counseling    Left leg swelling    S/P laparoscopic gastric restrictive procedure with gastric bypass and small intestinal reconstruction to limit absorption    Hypertriglyceridemia    Elevated serum creatinine    Elevated serum alkaline phosphatase level    Vitamin D deficiency    Bilateral lower extremity edema    Abnormal results of cardiovascular function studies    Abdominal pain, acute, right upper quadrant    Generalized enlarged lymph nodes    Obesity, Class I, BMI 30-34.9       Past Medical History:   Diagnosis Date    Abnormal EKG     CLEARED BY CARDIOLOGY    Asthma     Eczema     H/O CHF (congestive heart failure) 2005    Hemorrhoids 02/16/2022    History of anemia     History of gout     History of hemorrhoids     History of iron deficiency anemia     History of kidney stones 02/16/2022    History of myocardial infarction 2012    History of tachycardia     Hyperlipidemia     Hypertension     IBS (irritable bowel syndrome) 02/16/2022    Kidney stone     history    Leiomyoma     Murmur, heart     PONV (postoperative nausea and vomiting)     Rheumatoid arthritis 02/16/2022       The following portions of the patient's history were reviewed  and updated as appropriate: allergies, current medications, past family history, past medical history, past social history, past surgical history, and problem list.    Vitals:    12/05/24 1031   BP: (!) 156/101   Pulse: 68   Temp: 97.5 °F (36.4 °C)       Physical Exam  Vitals and nursing note reviewed.   Constitutional:       Appearance: She is well-developed.   Neck:      Thyroid: No thyromegaly.   Cardiovascular:      Rate and Rhythm: Normal rate.   Pulmonary:      Effort: Pulmonary effort is normal. No respiratory distress.   Abdominal:      Palpations: Abdomen is soft.   Musculoskeletal:         General: No tenderness.   Skin:     General: Skin is warm and dry.   Neurological:      Mental Status: She is alert.   Psychiatric:         Behavior: Behavior normal.       Assessment:   Post-op, the patient is doing well with protein and fluid intake. Her blood pressure is elevated today. She does report taking her blood pressure medication today. Her abdominal CT was concerning. We did discuss gallbladder sludge and gallstones but no obvious blockage was seen. She still has hives on her upper legs but reports that itching has improved somewhat with holding her actigall for the past two days.     Encounter Diagnoses   Name Primary?    S/P laparoscopic gastric restrictive procedure with gastric bypass and small intestinal reconstruction to limit absorption Yes    Obesity, Class I, BMI 30-34.9        Plan:   Patient was strongly encouraged to follow up with oncology. I think it is very unlikely that actigall is causing lymphadenopathy much less such wide spread lymphadenopathy, splenomegaly, and atelectasis. I did offer to workup her gallbladder further and proceed with a HIDA scan to better measure gallbladder functionality.  Encouraged patient to be sure to get plenty of lean protein per day through small frequent meals all with a protein source.   Activity restrictions: none.   Recommended patient be sure to get at  least 70 grams of protein per day by eating small, frequent meals all with high lean protein choices. Be sure to limit/cut back on daily carbohydrate intake. Discussed with the patient the recommended amount of water per day to intake- half of body weight in ounces. Reviewed vitamin requirements. Be sure to do routine exercise, 150 minutes per week minimum, including both cardio and strength training.     Instructions / Recommendations: dietary counseling recommended, recommended a daily protein intake of  grams, vitamin supplement(s) recommended, recommended exercising at least 150 minutes per week, behavior modifications recommended and instructed to call the office for concerns, questions, or problems.     The patient was instructed to follow up in 3 months .     Total time spent during this encounter today was 35 minutes

## 2024-12-19 ENCOUNTER — TELEPHONE (OUTPATIENT)
Dept: ONCOLOGY | Facility: CLINIC | Age: 54
End: 2024-12-19
Payer: COMMERCIAL

## 2024-12-19 DIAGNOSIS — I10 PRIMARY HYPERTENSION: Chronic | ICD-10-CM

## 2024-12-19 RX ORDER — LISINOPRIL 10 MG/1
10 TABLET ORAL DAILY
Qty: 30 TABLET | Refills: 8 | Status: SHIPPED | OUTPATIENT
Start: 2024-12-19

## 2024-12-19 RX ORDER — CEFUROXIME AXETIL 250 MG/1
250 TABLET ORAL 2 TIMES DAILY
Qty: 10 TABLET | Refills: 0 | OUTPATIENT
Start: 2024-12-19

## 2024-12-19 NOTE — TELEPHONE ENCOUNTER
I left a message that we will not be able to transfer her care to dr Drake, she will have to continue with dr Sanchez. I also told her I would help her schedule with a different practice if she chooses not to stay with Dr Sanchez. I left my number [phone] for her to mary me back.

## 2024-12-24 NOTE — TELEPHONE ENCOUNTER
Relay:  Called patient (got no answer and voicemail full) to advise Emilia Jorgito is unable to complete refill as patient is over due for an appointment. Please schedule office visit.

## 2025-08-14 ENCOUNTER — OFFICE VISIT (OUTPATIENT)
Dept: CARDIOLOGY | Facility: CLINIC | Age: 55
End: 2025-08-14
Payer: COMMERCIAL

## 2025-08-14 VITALS
BODY MASS INDEX: 34.71 KG/M2 | SYSTOLIC BLOOD PRESSURE: 152 MMHG | HEIGHT: 68 IN | DIASTOLIC BLOOD PRESSURE: 85 MMHG | WEIGHT: 229 LBS | HEART RATE: 70 BPM

## 2025-08-14 DIAGNOSIS — I10 PRIMARY HYPERTENSION: Chronic | ICD-10-CM

## 2025-08-14 DIAGNOSIS — E78.5 HYPERLIPIDEMIA, UNSPECIFIED HYPERLIPIDEMIA TYPE: Primary | ICD-10-CM

## 2025-08-14 PROCEDURE — 99214 OFFICE O/P EST MOD 30 MIN: CPT | Performed by: INTERNAL MEDICINE

## 2025-08-14 RX ORDER — LISINOPRIL 10 MG/1
10 TABLET ORAL DAILY
Qty: 30 TABLET | Refills: 8 | Status: SHIPPED | OUTPATIENT
Start: 2025-08-14

## 2025-08-14 RX ORDER — SPIRONOLACTONE 25 MG/1
25 TABLET ORAL DAILY
Qty: 90 TABLET | Refills: 3 | Status: SHIPPED | OUTPATIENT
Start: 2025-08-14

## (undated) DEVICE — SYR LUERLOK 20CC BX/50

## (undated) DEVICE — GLV SURG TRIUMPH CLASSIC PF LTX 7 STRL

## (undated) DEVICE — LAPAROSCOPIC DISSECTOR: Brand: DEROYAL

## (undated) DEVICE — SEAL

## (undated) DEVICE — APPL HEMOS FOR DELIVERY FLOSEAL

## (undated) DEVICE — GLV SURG SENSICARE PI MIC PF SZ6 LF STRL

## (undated) DEVICE — SPNG LAP 18X18IN LF STRL PK/5

## (undated) DEVICE — SUT VIC 0 TIES 18IN J912G

## (undated) DEVICE — GLV SURG SENSICARE PI PF LF 8.5 GRN STRL

## (undated) DEVICE — BLADELESS OBTURATOR: Brand: WECK VISTA

## (undated) DEVICE — GLV SURG BIOGEL LTX PF 7 1/2

## (undated) DEVICE — APPL CHLORAPREP HI/LITE 26ML ORNG

## (undated) DEVICE — Device

## (undated) DEVICE — ANTIBACTERIAL UNDYED BRAIDED (POLYGLACTIN 910), SYNTHETIC ABSORBABLE SUTURE: Brand: COATED VICRYL

## (undated) DEVICE — REDUCER: Brand: ENDOWRIST

## (undated) DEVICE — DRAPE,REIN 53X77,STERILE: Brand: MEDLINE

## (undated) DEVICE — DECANTER BAG 9": Brand: MEDLINE INDUSTRIES, INC.

## (undated) DEVICE — TROC STANDARDTROCAR FOR/TITANSGS 19MM DISP STRL

## (undated) DEVICE — VESSEL SEALER EXTEND: Brand: ENDOWRIST

## (undated) DEVICE — APL DUPLOSPRAYER MIS 40CM

## (undated) DEVICE — LAPAROSCOPIC SMOKE ELIMINATION DEVICE: Brand: PNEUVIEW XE

## (undated) DEVICE — SYR LUERLOK 20CC

## (undated) DEVICE — VISIGI 3D®  CALIBRATION SYSTEM  SIZE 36FR BYPASS/SHORT: Brand: BOEHRINGER® VISIGI 3D®  CALIBRATION SYSTEM  SIZE 36FR BYPASS/SHORT

## (undated) DEVICE — GOWN,NON-REINFORCED,SIRUS,SET IN SLV,XL: Brand: MEDLINE

## (undated) DEVICE — DEV COND GAS LAP INSUFLOW W/LUER CONN

## (undated) DEVICE — VIOLET BRAIDED (POLYGLACTIN 910), SYNTHETIC ABSORBABLE SUTURE: Brand: COATED VICRYL

## (undated) DEVICE — SUT SILK 0 SH 30IN K834H

## (undated) DEVICE — TIP COVER ACCESSORY

## (undated) DEVICE — OSC ROBOT BARIATRIC: Brand: MEDLINE INDUSTRIES, INC.

## (undated) DEVICE — CATH FOL SIMPLYLATEX SILELAST 16F 17IN

## (undated) DEVICE — BLCK/BITE BLOX W/DENTL/RIM W/STRAP 54F

## (undated) DEVICE — CONN TBG Y 5 IN 1 LF STRL

## (undated) DEVICE — DRAPE,UNDERBUTTOCKS,PCH,STERILE: Brand: MEDLINE

## (undated) DEVICE — LN SMPL CO2 SHTRM SD STREAM W/M LUER

## (undated) DEVICE — LOU GENERAL ROBOT: Brand: MEDLINE INDUSTRIES, INC.

## (undated) DEVICE — NDL SPINE 22G 31/2IN BLK

## (undated) DEVICE — PK CATH CARD 40

## (undated) DEVICE — SOL ANTISTICK CAUTRY ELECTROLUBE LF

## (undated) DEVICE — MARKR SKIN W/RULR AND LBL

## (undated) DEVICE — KT MANIFLD CARDIAC

## (undated) DEVICE — UNDYED BRAIDED (POLYGLACTIN 910), SYNTHETIC ABSORBABLE SUTURE: Brand: COATED VICRYL

## (undated) DEVICE — GLV SURG SENSICARE POLYISPRN W/ALOE PF LF 6.5 GRN STRL

## (undated) DEVICE — CANNULA SEAL

## (undated) DEVICE — KT ORCA ORCAPOD DISP STRL

## (undated) DEVICE — SOL LR 1000ML

## (undated) DEVICE — TROC BLADLES AIRSEAL/OPTI THRD 8X120MM 1P/U

## (undated) DEVICE — TUBING, SUCTION, 1/4" X 10', STRAIGHT: Brand: MEDLINE

## (undated) DEVICE — SUT VIC 0 CT1 27IN DYED J340H

## (undated) DEVICE — STAPLER 60: Brand: SUREFORM

## (undated) DEVICE — SEALANT WND FIBRIN TISSEEL PREFIL/SYR/PRIMAFZ 4ML

## (undated) DEVICE — SUT SILK 2/0 SH 30IN K833H

## (undated) DEVICE — PATIENT RETURN ELECTRODE, SINGLE-USE, CONTACT QUALITY MONITORING, ADULT, WITH 9FT CORD, FOR PATIENTS WEIGING OVER 33LBS. (15KG): Brand: MEGADYNE

## (undated) DEVICE — Device: Brand: STANDARD BOUGIE, 38FR

## (undated) DEVICE — ENDOPATH XCEL WITH OPTIVIEW TECHNOLOGY BLADELESS TROCARS WITH STABILITY SLEEVES: Brand: ENDOPATH XCEL OPTIVIEW

## (undated) DEVICE — GLV SURG SENSICARE PI MIC PF SZ8.5 LF STRL

## (undated) DEVICE — FRCP BX RADJAW4 NDL 2.8 240CM LG OG BX40

## (undated) DEVICE — BLCK/BITE BLOX WO/DENTL/RIM W/STRAP 54F

## (undated) DEVICE — LOU LITHOTOMY ROBOTIC: Brand: MEDLINE INDUSTRIES, INC.

## (undated) DEVICE — GLIDESHEATH SLENDER STAINLESS STEEL KIT: Brand: GLIDESHEATH SLENDER

## (undated) DEVICE — ENDOPATH XCEL BLADELESS TROCARS WITH STABILITY SLEEVES: Brand: ENDOPATH XCEL

## (undated) DEVICE — CATH DIAG IMPULSE FR4 5F 100CM

## (undated) DEVICE — NDL HYPO PRECISIONGLIDE REG 21G 1 1/2

## (undated) DEVICE — LAPAROVUE VISIBILITY SYSTEM LAPAROSCOPIC SOLUTIONS: Brand: LAPAROVUE

## (undated) DEVICE — OBT BLADLES ENDOWRIST DAVINCI/S 8MM

## (undated) DEVICE — SUT MNCRYL PLS ANTIB UD 4/0 PS2 18IN

## (undated) DEVICE — KIT INCLUDES: GTB14, ALEXIS CONTAINED EXT SYS 5BXCNGL2, GELPOINT ADVANCED ACCESS PLATFORM: Brand: ALEXIS CONTAINED EXTRACTION SYSTEM WITH GELPOINT ADVANCED ACCESS PLATFORM

## (undated) DEVICE — SUT VIC 2/0 SH 27IN

## (undated) DEVICE — TR BAND RADIAL ARTERY COMPRESSION DEVICE: Brand: TR BAND

## (undated) DEVICE — 500ML,PRESSURE INFUSER W/STOPCOCK: Brand: MEDLINE

## (undated) DEVICE — DGW .035 FC J3MM 260CM TEF: Brand: EMERALD

## (undated) DEVICE — SEALANT HEMOS FLOSEAL MATRX W/MALL TP 5ML

## (undated) DEVICE — ARM DRAPE

## (undated) DEVICE — SUT VIC 0 CT1 36IN J946H

## (undated) DEVICE — MSK PROC CURAPLEX O2 2/ADAPT 7FT

## (undated) DEVICE — ADAPT CLN BIOGUARD AIR/H2O DISP

## (undated) DEVICE — IRRIGATOR BULB ASEPTO 60CC STRL

## (undated) DEVICE — CATH DIAG IMPULSE FL3.5 5F 100CM

## (undated) DEVICE — MANIP UTER RUMI 2 KOH EFFICIENT 4CM BL

## (undated) DEVICE — ST TBG AIRSEAL BIF FLTR W/ACT/CHARCOAL/FLTR

## (undated) DEVICE — SENSR O2 OXIMAX FNGR A/ 18IN NONSTR

## (undated) DEVICE — COLUMN DRAPE

## (undated) DEVICE — 3M™ STERI-STRIP™ REINFORCED ADHESIVE SKIN CLOSURES, R1547, 1/2 IN X 4 IN (12 MM X 100 MM), 6 STRIPS/ENVELOPE: Brand: 3M™ STERI-STRIP™

## (undated) DEVICE — NDL HYPO PRECISIONGLIDE REG 20G 1 1/2

## (undated) DEVICE — VISUALIZATION SYSTEM: Brand: CLEARIFY

## (undated) DEVICE — MANIP UTER RUMI TP 6.7MMX8CM BLU

## (undated) DEVICE — LAPAROSCOPIC SMOKE FILTRATION SYSTEM: Brand: PALL LAPAROSHIELD® PLUS LAPAROSCOPIC SMOKE FILTRATION SYSTEM

## (undated) DEVICE — 3M™ STERI-STRIP™ COMPOUND BENZOIN TINCTURE 40 BAGS/CARTON 4 CARTONS/CASE C1544: Brand: 3M™ STERI-STRIP™

## (undated) DEVICE — DRAPE,UTILITY,TAPE,15X26,STERILE: Brand: MEDLINE